# Patient Record
Sex: FEMALE | Race: WHITE | NOT HISPANIC OR LATINO | Employment: UNEMPLOYED | URBAN - NONMETROPOLITAN AREA
[De-identification: names, ages, dates, MRNs, and addresses within clinical notes are randomized per-mention and may not be internally consistent; named-entity substitution may affect disease eponyms.]

---

## 2021-02-09 ENCOUNTER — APPOINTMENT (OUTPATIENT)
Dept: GENERAL RADIOLOGY | Facility: HOSPITAL | Age: 41
End: 2021-02-09

## 2021-02-09 ENCOUNTER — HOSPITAL ENCOUNTER (EMERGENCY)
Facility: HOSPITAL | Age: 41
Discharge: HOME | End: 2021-02-09
Admitting: EMERGENCY MEDICINE

## 2021-02-09 VITALS
BODY MASS INDEX: 31.1 KG/M2 | RESPIRATION RATE: 16 BRPM | HEART RATE: 85 BPM | DIASTOLIC BLOOD PRESSURE: 79 MMHG | SYSTOLIC BLOOD PRESSURE: 127 MMHG | OXYGEN SATURATION: 96 % | WEIGHT: 210 LBS | TEMPERATURE: 98.6 F | HEIGHT: 69 IN

## 2021-02-09 DIAGNOSIS — J45.901 EXACERBATION OF ASTHMA, UNSPECIFIED ASTHMA SEVERITY, UNSPECIFIED WHETHER PERSISTENT: Primary | ICD-10-CM

## 2021-02-09 DIAGNOSIS — J06.9 VIRAL URI WITH COUGH: ICD-10-CM

## 2021-02-09 LAB
ALBUMIN SERPL-MCNC: 4 G/DL (ref 3.5–5.2)
ALBUMIN/GLOB SERPL: 1.7 G/DL
ALP SERPL-CCNC: 62 U/L (ref 39–117)
ALT SERPL W P-5'-P-CCNC: <5 U/L (ref 1–33)
ANION GAP SERPL CALCULATED.3IONS-SCNC: 8 MMOL/L (ref 5–15)
ARTERIAL PATENCY WRIST A: POSITIVE
AST SERPL-CCNC: 13 U/L (ref 1–32)
ATMOSPHERIC PRESS: 757 MMHG
B-HCG UR QL: NEGATIVE
BASE EXCESS BLDA CALC-SCNC: -0.5 MMOL/L (ref 0–2)
BASOPHILS # BLD AUTO: 0.1 10*3/MM3 (ref 0–0.2)
BASOPHILS NFR BLD AUTO: 1 % (ref 0–1.5)
BDY SITE: ABNORMAL
BILIRUB SERPL-MCNC: 0.3 MG/DL (ref 0–1.2)
BODY TEMPERATURE: 37 C
BUN SERPL-MCNC: 9 MG/DL (ref 6–20)
BUN/CREAT SERPL: 12.3 (ref 7–25)
CALCIUM SPEC-SCNC: 9.1 MG/DL (ref 8.6–10.5)
CHLORIDE SERPL-SCNC: 107 MMOL/L (ref 98–107)
CO2 SERPL-SCNC: 23 MMOL/L (ref 22–29)
CREAT SERPL-MCNC: 0.73 MG/DL (ref 0.57–1)
D DIMER PPP FEU-MCNC: 0.29 MG/L (FEU) (ref 0–0.5)
D-LACTATE SERPL-SCNC: 0.9 MMOL/L (ref 0.5–2)
DEPRECATED RDW RBC AUTO: 44.9 FL (ref 37–54)
EOSINOPHIL # BLD AUTO: 0.23 10*3/MM3 (ref 0–0.4)
EOSINOPHIL NFR BLD AUTO: 2.4 % (ref 0.3–6.2)
ERYTHROCYTE [DISTWIDTH] IN BLOOD BY AUTOMATED COUNT: 16.7 % (ref 12.3–15.4)
FLUAV RNA RESP QL NAA+PROBE: NOT DETECTED
FLUBV RNA RESP QL NAA+PROBE: NOT DETECTED
GFR SERPL CREATININE-BSD FRML MDRD: 88 ML/MIN/1.73
GLOBULIN UR ELPH-MCNC: 2.4 GM/DL
GLUCOSE SERPL-MCNC: 121 MG/DL (ref 65–99)
HCO3 BLDA-SCNC: 23.5 MMOL/L (ref 20–26)
HCT VFR BLD AUTO: 32.7 % (ref 34–46.6)
HGB BLD-MCNC: 10.1 G/DL (ref 12–15.9)
IMM GRANULOCYTES # BLD AUTO: 0.02 10*3/MM3 (ref 0–0.05)
IMM GRANULOCYTES NFR BLD AUTO: 0.2 % (ref 0–0.5)
INTERNAL NEGATIVE CONTROL: NEGATIVE
INTERNAL POSITIVE CONTROL: POSITIVE
LYMPHOCYTES # BLD AUTO: 2.39 10*3/MM3 (ref 0.7–3.1)
LYMPHOCYTES NFR BLD AUTO: 24.6 % (ref 19.6–45.3)
Lab: ABNORMAL
Lab: NORMAL
MCH RBC QN AUTO: 23.5 PG (ref 26.6–33)
MCHC RBC AUTO-ENTMCNC: 30.9 G/DL (ref 31.5–35.7)
MCV RBC AUTO: 76.2 FL (ref 79–97)
MODALITY: ABNORMAL
MONOCYTES # BLD AUTO: 0.61 10*3/MM3 (ref 0.1–0.9)
MONOCYTES NFR BLD AUTO: 6.3 % (ref 5–12)
NEUTROPHILS NFR BLD AUTO: 6.38 10*3/MM3 (ref 1.7–7)
NEUTROPHILS NFR BLD AUTO: 65.5 % (ref 42.7–76)
NRBC BLD AUTO-RTO: 0 /100 WBC (ref 0–0.2)
PCO2 BLDA: 35.2 MM HG (ref 35–45)
PCO2 TEMP ADJ BLD: 35.2 MM HG (ref 35–45)
PH BLDA: 7.43 PH UNITS (ref 7.35–7.45)
PH, TEMP CORRECTED: 7.43 PH UNITS (ref 7.35–7.45)
PLATELET # BLD AUTO: 327 10*3/MM3 (ref 140–450)
PMV BLD AUTO: 10.9 FL (ref 6–12)
PO2 BLDA: 86.9 MM HG (ref 83–108)
PO2 TEMP ADJ BLD: 86.9 MM HG (ref 83–108)
POTASSIUM SERPL-SCNC: 4 MMOL/L (ref 3.5–5.2)
PROCALCITONIN SERPL-MCNC: 0.02 NG/ML (ref 0–0.25)
PROT SERPL-MCNC: 6.4 G/DL (ref 6–8.5)
RBC # BLD AUTO: 4.29 10*6/MM3 (ref 3.77–5.28)
SAO2 % BLDCOA: 97.7 % (ref 94–99)
SARS-COV-2 RNA RESP QL NAA+PROBE: NOT DETECTED
SODIUM SERPL-SCNC: 138 MMOL/L (ref 136–145)
VENTILATOR MODE: ABNORMAL
WBC # BLD AUTO: 9.73 10*3/MM3 (ref 3.4–10.8)

## 2021-02-09 PROCEDURE — 83605 ASSAY OF LACTIC ACID: CPT | Performed by: PHYSICIAN ASSISTANT

## 2021-02-09 PROCEDURE — 94640 AIRWAY INHALATION TREATMENT: CPT

## 2021-02-09 PROCEDURE — 99284 EMERGENCY DEPT VISIT MOD MDM: CPT

## 2021-02-09 PROCEDURE — 84145 PROCALCITONIN (PCT): CPT | Performed by: PHYSICIAN ASSISTANT

## 2021-02-09 PROCEDURE — 85379 FIBRIN DEGRADATION QUANT: CPT | Performed by: PHYSICIAN ASSISTANT

## 2021-02-09 PROCEDURE — 25010000002 KETOROLAC TROMETHAMINE PER 15 MG: Performed by: PHYSICIAN ASSISTANT

## 2021-02-09 PROCEDURE — 94799 UNLISTED PULMONARY SVC/PX: CPT

## 2021-02-09 PROCEDURE — 80053 COMPREHEN METABOLIC PANEL: CPT | Performed by: PHYSICIAN ASSISTANT

## 2021-02-09 PROCEDURE — 82803 BLOOD GASES ANY COMBINATION: CPT

## 2021-02-09 PROCEDURE — 81025 URINE PREGNANCY TEST: CPT | Performed by: PHYSICIAN ASSISTANT

## 2021-02-09 PROCEDURE — 87636 SARSCOV2 & INF A&B AMP PRB: CPT | Performed by: PHYSICIAN ASSISTANT

## 2021-02-09 PROCEDURE — 25010000002 DEXAMETHASONE PER 1 MG: Performed by: PHYSICIAN ASSISTANT

## 2021-02-09 PROCEDURE — 71045 X-RAY EXAM CHEST 1 VIEW: CPT

## 2021-02-09 PROCEDURE — 85025 COMPLETE CBC W/AUTO DIFF WBC: CPT | Performed by: PHYSICIAN ASSISTANT

## 2021-02-09 PROCEDURE — 96374 THER/PROPH/DIAG INJ IV PUSH: CPT

## 2021-02-09 PROCEDURE — 96375 TX/PRO/DX INJ NEW DRUG ADDON: CPT

## 2021-02-09 PROCEDURE — 36600 WITHDRAWAL OF ARTERIAL BLOOD: CPT

## 2021-02-09 RX ORDER — METHYLPREDNISOLONE 4 MG/1
TABLET ORAL
Qty: 21 EACH | Refills: 0 | Status: ON HOLD | OUTPATIENT
Start: 2021-02-09 | End: 2021-10-25

## 2021-02-09 RX ORDER — SODIUM CHLORIDE 0.9 % (FLUSH) 0.9 %
10 SYRINGE (ML) INJECTION AS NEEDED
Status: DISCONTINUED | OUTPATIENT
Start: 2021-02-09 | End: 2021-02-09 | Stop reason: HOSPADM

## 2021-02-09 RX ORDER — ALBUTEROL SULFATE 90 UG/1
2 AEROSOL, METERED RESPIRATORY (INHALATION) EVERY 4 HOURS PRN
Qty: 6.7 G | Refills: 0 | Status: SHIPPED | OUTPATIENT
Start: 2021-02-09 | End: 2022-01-11 | Stop reason: SDUPTHER

## 2021-02-09 RX ORDER — DEXAMETHASONE SODIUM PHOSPHATE 4 MG/ML
4 INJECTION, SOLUTION INTRA-ARTICULAR; INTRALESIONAL; INTRAMUSCULAR; INTRAVENOUS; SOFT TISSUE ONCE
Status: COMPLETED | OUTPATIENT
Start: 2021-02-09 | End: 2021-02-09

## 2021-02-09 RX ORDER — IPRATROPIUM BROMIDE AND ALBUTEROL SULFATE 2.5; .5 MG/3ML; MG/3ML
3 SOLUTION RESPIRATORY (INHALATION) ONCE
Status: COMPLETED | OUTPATIENT
Start: 2021-02-09 | End: 2021-02-09

## 2021-02-09 RX ORDER — AZITHROMYCIN 250 MG/1
TABLET, FILM COATED ORAL
Qty: 6 TABLET | Refills: 0 | Status: ON HOLD | OUTPATIENT
Start: 2021-02-09 | End: 2021-10-25

## 2021-02-09 RX ORDER — KETOROLAC TROMETHAMINE 30 MG/ML
30 INJECTION, SOLUTION INTRAMUSCULAR; INTRAVENOUS ONCE
Status: COMPLETED | OUTPATIENT
Start: 2021-02-09 | End: 2021-02-09

## 2021-02-09 RX ADMIN — DEXAMETHASONE SODIUM PHOSPHATE 4 MG: 4 INJECTION, SOLUTION INTRAMUSCULAR; INTRAVENOUS at 14:57

## 2021-02-09 RX ADMIN — KETOROLAC TROMETHAMINE 30 MG: 30 INJECTION, SOLUTION INTRAMUSCULAR; INTRAVENOUS at 16:40

## 2021-02-09 RX ADMIN — IPRATROPIUM BROMIDE AND ALBUTEROL SULFATE 3 ML: 2.5; .5 SOLUTION RESPIRATORY (INHALATION) at 14:48

## 2021-02-09 RX ADMIN — SODIUM CHLORIDE 1000 ML: 9 INJECTION, SOLUTION INTRAVENOUS at 14:57

## 2021-02-09 NOTE — DISCHARGE INSTRUCTIONS
Follow up with one of the [facility] physician groups below to setup primary care. If you have trouble making an appointment, please call the [facility] Nurse Line at [phone]    Dr. Elena Taylor DO, Dr. Aba Joyner DO, and LILLY Cruz  [facility]  [address] [place] [address], [address] [phone]    Dr. Heladio Bui MD  [facility] - [address] [address], [address], [place], [place] [id number] [phone]    Dr. Owen Arciniega DO, Dr. Aly Murphy DO,  LILLY Krishnamurthy, and LILLY Carrillo  [facility] - [address] [address], [address], [place], [place] [address] [phone]     Dr. Marivel Herrera MD, and LILLY Leon  [facility] [address] [address], [place], [place] [id number] [phone]    Dr. Jimmy Valladares MD and Dr. Jaquan Ayala MD  [address], [address] [phone]    Dr. Magno Flores MD  [facility]  [address] [place] [address], [address], [place], [place], [address] [phone]    Dr. Dionisio Colby MD  [facility]  [address], [place], [address] [phone]

## 2021-02-09 NOTE — ED PROVIDER NOTES
Subjective   History of Present Illness    Patient is a 41-year-old female presenting to ED with shortness of breath and fever.  Patient has medical history significant for asthma.  Patient reports over the past few days she has not felt well.  Patient reports that this morning she woke up with a new symptom of nausea as well as concerns for fever.  Patient reports she had an oral fever of 103.  Patient reports that she has had generalized body aches, left arm soreness, nonproductive cough, as well as increase of wheezing.  Patient reports that she has been using her albuterol rescue inhaler more frequently with shorter intervals of relief.  Patient denies any chills or diaphoresis.  Patient denies any emesis, diarrhea, abdominal pain.  Patient denies any urinary symptoms. Patient reports she has been using ibuprofen to try to help with her left arm discomfort with her last dose this morning.  Patient reports that she did try a dose of Tylenol at about 11:30 AM.  Patient reports a generalized headache and denies any focal neurological deficits.  Patient denies any dizziness, lightheadedness.  Patient denies any known recent sick contact.    Patient has no known medication allergies.  Patient has medical [place] for asthma.  Exam patient has surgical history positive for , tubal ligation, tonsillectomy.  Patient reports she is a current less than 1 pack/day cigarette smoker.  Patient reports she will drink socially however over the past couple days should she drink more to celebrate her birthday.  Patient denies use of any further tobacco products, marijuana, or any other IV/recreational/illicit drugs.    Records reviewed patient was last seen in the ED on 2017 for right foot injury, foot swelling, contusion of right foot.  Patient has no outpatient, inpatient, or ED visits since.    Review of Systems   Constitutional: Positive for fever (103 orally at highest this morning). Negative for chills and  diaphoresis.   HENT: Negative for congestion.    Eyes: Negative.    Respiratory: Positive for cough (Nonproductive), chest tightness, shortness of breath and wheezing.    Cardiovascular: Negative for chest pain.   Gastrointestinal: Positive for nausea. Negative for abdominal pain and vomiting.   Genitourinary: Negative.    Musculoskeletal: Positive for myalgias (Generalized body aches).   Skin: Negative.    Neurological: Positive for headaches (Generalized).   Psychiatric/Behavioral: Negative.    All other systems reviewed and are negative.      Past Medical History:   Diagnosis Date   • Asthma        No Known Allergies    Past Surgical History:   Procedure Laterality Date   •  SECTION     • TONSILLECTOMY     • TUBAL ABDOMINAL LIGATION         History reviewed. No pertinent family history.    Social History     Socioeconomic History   • Marital status:      Spouse name: Not on file   • Number of children: Not on file   • Years of education: Not on file   • Highest education level: Not on file   Tobacco Use   • Smoking status: Current Every Day Smoker     Packs/day: 0.50   Substance and Sexual Activity   • Alcohol use: Not Currently   • Drug use: Not Currently           Objective   Physical Exam  Vitals signs and nursing note reviewed.   Constitutional:       General: She is in acute distress (Appears uncomfortable).      Appearance: Normal appearance. She is well-developed, well-groomed and overweight. She is ill-appearing. She is not toxic-appearing or diaphoretic.   HENT:      Head: Normocephalic.      Nose: Nose normal. No congestion.      Mouth/Throat:      Mouth: Mucous membranes are moist.      Pharynx: Oropharynx is clear. Posterior oropharyngeal erythema present.   Eyes:      General: No scleral icterus.     Extraocular Movements: Extraocular movements intact.      Conjunctiva/sclera: Conjunctivae normal.      Pupils: Pupils are equal, round, and reactive to light.   Neck:       Musculoskeletal: Normal range of motion.   Cardiovascular:      Rate and Rhythm: Regular rhythm. Tachycardia present.      Heart sounds: Normal heart sounds.   Pulmonary:      Effort: Pulmonary effort is normal. No tachypnea, accessory muscle usage, prolonged expiration or respiratory distress.      Breath sounds: No stridor. Wheezing (Inspiratory and expiratory throughout, most notable in bilateral lower lung bases) present. No decreased breath sounds, rhonchi or rales.   Chest:      Chest wall: No tenderness (No reproducible tenderness palpation of chest wall).   Abdominal:      General: Bowel sounds are normal.      Palpations: Abdomen is soft.   Musculoskeletal: Normal range of motion.      Right lower leg: No edema.      Left lower leg: No edema.   Skin:     General: Skin is warm and dry.      Coloration: Skin is not cyanotic or pale.   Neurological:      Mental Status: She is alert and oriented to person, place, and time.   Psychiatric:         Attention and Perception: Attention normal.         Mood and Affect: Affect normal. Mood is anxious (mild).         Speech: Speech normal.         Behavior: Behavior normal. Behavior is cooperative.         Procedures           ED Course    WELLS CRITERIA: 0 (low risk group, 1.3% chance of PE in an ED population)    PERC SCORE: 0 (no need for further work-up, less than 2% chance of PE)    ED Course as of Feb 09 2250   Tue Feb 09, 2021   1446 Pregnancy testing negative.ABG with no acute findings including normal pH, normal CO2, normal O2, normal HCO3.    [JS]   1530 CBC with no leukocytosis/leukopenia, H&H stable at 10.1/32.7.    [JS]   1555 CMP with hyperglycemia 121 and no other acute findings.  No anion gap.Procalcitonin 0.02.D-dimer 0.29.Lactic acid 0.9.    CXR shows: No acute disease.    COVID swab pending.     [JS]   1612 COVID negative.  Influenza negative.     [JS]   1612 Upon reevaluation at this time patient resting in bed reporting she is feeling better.   Repeat lung examination shows resolution of wheezing as well as improvement of chest tightness.  Discussed with patient lab and imaging findings.  Discussed negative respiratory swabs.  Discussed need to establish care with a primary care provider and provided patient list of available for doctors in this area.  Discussed need for reevaluation within the next 24 h.  Discussed need for continued symptomatic treatment at home.  Discussed very strict return precautions any for me to return to ED should she develop any new or worsening symptoms.  Patient with no further questions, concerns, needs at this time and is now stable for discharge.    [JS]      ED Course User Index  [JS] Jagjit Foster PA-C                                           MDM  Number of Diagnoses or Management Options     Amount and/or Complexity of Data Reviewed  Clinical lab tests: ordered and reviewed  Tests in the radiology section of CPT®: reviewed and ordered  Tests in the medicine section of CPT®: ordered and reviewed  Decide to obtain previous medical records or to obtain history from someone other than the patient: yes  Review and summarize past medical records: yes  Discuss the patient with other providers: yes        Final diagnoses:   Exacerbation of asthma, unspecified asthma severity, unspecified whether persistent   Viral URI with cough            Jagjit Foster PA-C  02/09/21 2555

## 2021-10-23 ENCOUNTER — APPOINTMENT (OUTPATIENT)
Dept: CT IMAGING | Facility: HOSPITAL | Age: 41
End: 2021-10-23

## 2021-10-23 ENCOUNTER — HOSPITAL ENCOUNTER (INPATIENT)
Facility: HOSPITAL | Age: 41
LOS: 3 days | Discharge: HOME | End: 2021-10-26
Attending: EMERGENCY MEDICINE | Admitting: FAMILY MEDICINE

## 2021-10-23 DIAGNOSIS — I73.9 PERIPHERAL VASCULAR DISEASE (HCC): Primary | ICD-10-CM

## 2021-10-23 DIAGNOSIS — I70.90 ATHEROSCLEROSIS: ICD-10-CM

## 2021-10-23 DIAGNOSIS — I74.8 SUBCLAVIAN ARTERY THROMBOSIS (HCC): ICD-10-CM

## 2021-10-23 DIAGNOSIS — F19.90 RECREATIONAL DRUG USE: ICD-10-CM

## 2021-10-23 LAB
ALBUMIN SERPL-MCNC: 4 G/DL (ref 3.5–5.2)
ALBUMIN/GLOB SERPL: 1.7 G/DL
ALP SERPL-CCNC: 64 U/L (ref 39–117)
ALT SERPL W P-5'-P-CCNC: <5 U/L (ref 1–33)
AMPHET+METHAMPHET UR QL: POSITIVE
AMPHETAMINES UR QL: POSITIVE
ANION GAP SERPL CALCULATED.3IONS-SCNC: 8 MMOL/L (ref 5–15)
APTT PPP: 28.7 SECONDS (ref 24.1–35)
APTT PPP: 31.5 SECONDS (ref 24.1–35)
APTT PPP: 53 SECONDS (ref 24.1–35)
AST SERPL-CCNC: 9 U/L (ref 1–32)
B-HCG UR QL: NEGATIVE
BARBITURATES UR QL SCN: NEGATIVE
BASOPHILS # BLD AUTO: 0.04 10*3/MM3 (ref 0–0.2)
BASOPHILS # BLD AUTO: 0.09 10*3/MM3 (ref 0–0.2)
BASOPHILS NFR BLD AUTO: 0.4 % (ref 0–1.5)
BASOPHILS NFR BLD AUTO: 0.8 % (ref 0–1.5)
BENZODIAZ UR QL SCN: NEGATIVE
BILIRUB SERPL-MCNC: 0.2 MG/DL (ref 0–1.2)
BUN SERPL-MCNC: 11 MG/DL (ref 6–20)
BUN/CREAT SERPL: 14.3 (ref 7–25)
BUPRENORPHINE SERPL-MCNC: NEGATIVE NG/ML
CALCIUM SPEC-SCNC: 9.3 MG/DL (ref 8.6–10.5)
CANNABINOIDS SERPL QL: NEGATIVE
CHLORIDE SERPL-SCNC: 105 MMOL/L (ref 98–107)
CK SERPL-CCNC: 53 U/L (ref 20–180)
CO2 SERPL-SCNC: 26 MMOL/L (ref 22–29)
COCAINE UR QL: NEGATIVE
CREAT SERPL-MCNC: 0.77 MG/DL (ref 0.57–1)
D-LACTATE SERPL-SCNC: 1.7 MMOL/L (ref 0.5–2)
DEPRECATED RDW RBC AUTO: 45.9 FL (ref 37–54)
DEPRECATED RDW RBC AUTO: 46.2 FL (ref 37–54)
EOSINOPHIL # BLD AUTO: 0 10*3/MM3 (ref 0–0.4)
EOSINOPHIL # BLD AUTO: 0.36 10*3/MM3 (ref 0–0.4)
EOSINOPHIL NFR BLD AUTO: 0 % (ref 0.3–6.2)
EOSINOPHIL NFR BLD AUTO: 3.1 % (ref 0.3–6.2)
ERYTHROCYTE [DISTWIDTH] IN BLOOD BY AUTOMATED COUNT: 16.8 % (ref 12.3–15.4)
ERYTHROCYTE [DISTWIDTH] IN BLOOD BY AUTOMATED COUNT: 17 % (ref 12.3–15.4)
EXPIRATION DATE: NORMAL
GFR SERPL CREATININE-BSD FRML MDRD: 83 ML/MIN/1.73
GLOBULIN UR ELPH-MCNC: 2.3 GM/DL
GLUCOSE SERPL-MCNC: 137 MG/DL (ref 65–99)
HCT VFR BLD AUTO: 33.3 % (ref 34–46.6)
HCT VFR BLD AUTO: 33.8 % (ref 34–46.6)
HGB BLD-MCNC: 9.5 G/DL (ref 12–15.9)
HGB BLD-MCNC: 9.6 G/DL (ref 12–15.9)
HOLD SPECIMEN: NORMAL
HOLD SPECIMEN: NORMAL
IMM GRANULOCYTES # BLD AUTO: 0.03 10*3/MM3 (ref 0–0.05)
IMM GRANULOCYTES # BLD AUTO: 0.05 10*3/MM3 (ref 0–0.05)
IMM GRANULOCYTES NFR BLD AUTO: 0.3 % (ref 0–0.5)
IMM GRANULOCYTES NFR BLD AUTO: 0.4 % (ref 0–0.5)
INR PPP: 1.11 (ref 0.91–1.09)
INTERNAL NEGATIVE CONTROL: NEGATIVE
INTERNAL POSITIVE CONTROL: POSITIVE
IRON 24H UR-MRATE: 16 MCG/DL (ref 37–145)
IRON SATN MFR SERPL: 3 % (ref 20–50)
LYMPHOCYTES # BLD AUTO: 0.8 10*3/MM3 (ref 0.7–3.1)
LYMPHOCYTES # BLD AUTO: 1.86 10*3/MM3 (ref 0.7–3.1)
LYMPHOCYTES NFR BLD AUTO: 15.9 % (ref 19.6–45.3)
LYMPHOCYTES NFR BLD AUTO: 8.4 % (ref 19.6–45.3)
Lab: NORMAL
MAGNESIUM SERPL-MCNC: 2 MG/DL (ref 1.6–2.6)
MCH RBC QN AUTO: 21.5 PG (ref 26.6–33)
MCH RBC QN AUTO: 21.6 PG (ref 26.6–33)
MCHC RBC AUTO-ENTMCNC: 28.4 G/DL (ref 31.5–35.7)
MCHC RBC AUTO-ENTMCNC: 28.5 G/DL (ref 31.5–35.7)
MCV RBC AUTO: 75.8 FL (ref 79–97)
MCV RBC AUTO: 75.9 FL (ref 79–97)
METHADONE UR QL SCN: NEGATIVE
MONOCYTES # BLD AUTO: 0.05 10*3/MM3 (ref 0.1–0.9)
MONOCYTES # BLD AUTO: 0.8 10*3/MM3 (ref 0.1–0.9)
MONOCYTES NFR BLD AUTO: 0.5 % (ref 5–12)
MONOCYTES NFR BLD AUTO: 6.8 % (ref 5–12)
NEUTROPHILS NFR BLD AUTO: 73 % (ref 42.7–76)
NEUTROPHILS NFR BLD AUTO: 8.54 10*3/MM3 (ref 1.7–7)
NEUTROPHILS NFR BLD AUTO: 8.63 10*3/MM3 (ref 1.7–7)
NEUTROPHILS NFR BLD AUTO: 90.4 % (ref 42.7–76)
NRBC BLD AUTO-RTO: 0 /100 WBC (ref 0–0.2)
NRBC BLD AUTO-RTO: 0 /100 WBC (ref 0–0.2)
OPIATES UR QL: NEGATIVE
OXYCODONE UR QL SCN: NEGATIVE
PCP UR QL SCN: NEGATIVE
PLATELET # BLD AUTO: 318 10*3/MM3 (ref 140–450)
PLATELET # BLD AUTO: 326 10*3/MM3 (ref 140–450)
PMV BLD AUTO: 10.2 FL (ref 6–12)
PMV BLD AUTO: 10.4 FL (ref 6–12)
POTASSIUM SERPL-SCNC: 4.2 MMOL/L (ref 3.5–5.2)
PROPOXYPH UR QL: NEGATIVE
PROT SERPL-MCNC: 6.3 G/DL (ref 6–8.5)
PROTHROMBIN TIME: 13.9 SECONDS (ref 11.9–14.6)
RBC # BLD AUTO: 4.39 10*6/MM3 (ref 3.77–5.28)
RBC # BLD AUTO: 4.46 10*6/MM3 (ref 3.77–5.28)
SARS-COV-2 RNA PNL SPEC NAA+PROBE: NOT DETECTED
SODIUM SERPL-SCNC: 139 MMOL/L (ref 136–145)
TIBC SERPL-MCNC: 533 MCG/DL (ref 298–536)
TRANSFERRIN SERPL-MCNC: 358 MG/DL (ref 200–360)
TRICYCLICS UR QL SCN: NEGATIVE
TROPONIN T SERPL-MCNC: <0.01 NG/ML (ref 0–0.03)
WBC # BLD AUTO: 11.7 10*3/MM3 (ref 3.4–10.8)
WBC # BLD AUTO: 9.55 10*3/MM3 (ref 3.4–10.8)
WHOLE BLOOD HOLD SPECIMEN: NORMAL
WHOLE BLOOD HOLD SPECIMEN: NORMAL

## 2021-10-23 PROCEDURE — 70496 CT ANGIOGRAPHY HEAD: CPT

## 2021-10-23 PROCEDURE — 70498 CT ANGIOGRAPHY NECK: CPT

## 2021-10-23 PROCEDURE — 36415 COLL VENOUS BLD VENIPUNCTURE: CPT

## 2021-10-23 PROCEDURE — 99253 IP/OBS CNSLTJ NEW/EST LOW 45: CPT | Performed by: SURGERY

## 2021-10-23 PROCEDURE — 85610 PROTHROMBIN TIME: CPT | Performed by: EMERGENCY MEDICINE

## 2021-10-23 PROCEDURE — 25010000002 PIPERACILLIN SOD-TAZOBACTAM PER 1 G: Performed by: EMERGENCY MEDICINE

## 2021-10-23 PROCEDURE — 0 IOPAMIDOL PER 1 ML: Performed by: EMERGENCY MEDICINE

## 2021-10-23 PROCEDURE — 93010 ELECTROCARDIOGRAM REPORT: CPT | Performed by: INTERNAL MEDICINE

## 2021-10-23 PROCEDURE — 36415 COLL VENOUS BLD VENIPUNCTURE: CPT | Performed by: FAMILY MEDICINE

## 2021-10-23 PROCEDURE — 80053 COMPREHEN METABOLIC PANEL: CPT | Performed by: EMERGENCY MEDICINE

## 2021-10-23 PROCEDURE — 83540 ASSAY OF IRON: CPT | Performed by: FAMILY MEDICINE

## 2021-10-23 PROCEDURE — 25010000002 DEXAMETHASONE PER 1 MG: Performed by: EMERGENCY MEDICINE

## 2021-10-23 PROCEDURE — 99284 EMERGENCY DEPT VISIT MOD MDM: CPT

## 2021-10-23 PROCEDURE — 82550 ASSAY OF CK (CPK): CPT | Performed by: EMERGENCY MEDICINE

## 2021-10-23 PROCEDURE — 80306 DRUG TEST PRSMV INSTRMNT: CPT | Performed by: EMERGENCY MEDICINE

## 2021-10-23 PROCEDURE — 25010000002 HEPARIN (PORCINE) PER 1000 UNITS: Performed by: EMERGENCY MEDICINE

## 2021-10-23 PROCEDURE — 25010000002 DIPHENHYDRAMINE PER 50 MG: Performed by: EMERGENCY MEDICINE

## 2021-10-23 PROCEDURE — 85730 THROMBOPLASTIN TIME PARTIAL: CPT | Performed by: EMERGENCY MEDICINE

## 2021-10-23 PROCEDURE — 72125 CT NECK SPINE W/O DYE: CPT

## 2021-10-23 PROCEDURE — 87635 SARS-COV-2 COVID-19 AMP PRB: CPT | Performed by: EMERGENCY MEDICINE

## 2021-10-23 PROCEDURE — 25010000002 ORPHENADRINE CITRATE PER 60 MG: Performed by: EMERGENCY MEDICINE

## 2021-10-23 PROCEDURE — 25010000002 ENOXAPARIN PER 10 MG: Performed by: EMERGENCY MEDICINE

## 2021-10-23 PROCEDURE — 25010000002 VANCOMYCIN 10 G RECONSTITUTED SOLUTION: Performed by: EMERGENCY MEDICINE

## 2021-10-23 PROCEDURE — 83605 ASSAY OF LACTIC ACID: CPT | Performed by: EMERGENCY MEDICINE

## 2021-10-23 PROCEDURE — 84466 ASSAY OF TRANSFERRIN: CPT | Performed by: FAMILY MEDICINE

## 2021-10-23 PROCEDURE — 87040 BLOOD CULTURE FOR BACTERIA: CPT | Performed by: EMERGENCY MEDICINE

## 2021-10-23 PROCEDURE — 25010000002 HEPARIN (PORCINE) PER 1000 UNITS: Performed by: FAMILY MEDICINE

## 2021-10-23 PROCEDURE — 93005 ELECTROCARDIOGRAM TRACING: CPT | Performed by: EMERGENCY MEDICINE

## 2021-10-23 PROCEDURE — 84484 ASSAY OF TROPONIN QUANT: CPT | Performed by: EMERGENCY MEDICINE

## 2021-10-23 PROCEDURE — 85730 THROMBOPLASTIN TIME PARTIAL: CPT | Performed by: FAMILY MEDICINE

## 2021-10-23 PROCEDURE — 71275 CT ANGIOGRAPHY CHEST: CPT

## 2021-10-23 PROCEDURE — 81025 URINE PREGNANCY TEST: CPT | Performed by: EMERGENCY MEDICINE

## 2021-10-23 PROCEDURE — 85025 COMPLETE CBC W/AUTO DIFF WBC: CPT | Performed by: EMERGENCY MEDICINE

## 2021-10-23 PROCEDURE — 83735 ASSAY OF MAGNESIUM: CPT | Performed by: EMERGENCY MEDICINE

## 2021-10-23 PROCEDURE — 25010000002 METOCLOPRAMIDE PER 10 MG: Performed by: EMERGENCY MEDICINE

## 2021-10-23 RX ORDER — HEPARIN SODIUM 1000 [USP'U]/ML
80 INJECTION, SOLUTION INTRAVENOUS; SUBCUTANEOUS ONCE
Status: COMPLETED | OUTPATIENT
Start: 2021-10-23 | End: 2021-10-23

## 2021-10-23 RX ORDER — HEPARIN SODIUM 1000 [USP'U]/ML
40 INJECTION, SOLUTION INTRAVENOUS; SUBCUTANEOUS AS NEEDED
Status: DISCONTINUED | OUTPATIENT
Start: 2021-10-23 | End: 2021-10-25

## 2021-10-23 RX ORDER — HEPARIN SODIUM 10000 [USP'U]/100ML
1500 INJECTION, SOLUTION INTRAVENOUS
Status: DISCONTINUED | OUTPATIENT
Start: 2021-10-23 | End: 2021-10-25

## 2021-10-23 RX ORDER — HEPARIN SODIUM 1000 [USP'U]/ML
80 INJECTION, SOLUTION INTRAVENOUS; SUBCUTANEOUS AS NEEDED
Status: DISCONTINUED | OUTPATIENT
Start: 2021-10-23 | End: 2021-10-25

## 2021-10-23 RX ORDER — HYDROCODONE BITARTRATE AND ACETAMINOPHEN 5; 325 MG/1; MG/1
1 TABLET ORAL EVERY 6 HOURS PRN
Status: DISCONTINUED | OUTPATIENT
Start: 2021-10-23 | End: 2021-10-26 | Stop reason: HOSPADM

## 2021-10-23 RX ORDER — ORPHENADRINE CITRATE 30 MG/ML
60 INJECTION INTRAMUSCULAR; INTRAVENOUS ONCE
Status: COMPLETED | OUTPATIENT
Start: 2021-10-23 | End: 2021-10-23

## 2021-10-23 RX ORDER — DIPHENHYDRAMINE HYDROCHLORIDE 50 MG/ML
25 INJECTION INTRAMUSCULAR; INTRAVENOUS ONCE
Status: COMPLETED | OUTPATIENT
Start: 2021-10-23 | End: 2021-10-23

## 2021-10-23 RX ORDER — METOCLOPRAMIDE HYDROCHLORIDE 5 MG/ML
10 INJECTION INTRAMUSCULAR; INTRAVENOUS ONCE
Status: COMPLETED | OUTPATIENT
Start: 2021-10-23 | End: 2021-10-23

## 2021-10-23 RX ORDER — DEXAMETHASONE SODIUM PHOSPHATE 10 MG/ML
10 INJECTION INTRAMUSCULAR; INTRAVENOUS ONCE
Status: COMPLETED | OUTPATIENT
Start: 2021-10-23 | End: 2021-10-23

## 2021-10-23 RX ADMIN — METOCLOPRAMIDE 10 MG: 5 INJECTION, SOLUTION INTRAMUSCULAR; INTRAVENOUS at 04:10

## 2021-10-23 RX ADMIN — ORPHENADRINE CITRATE 60 MG: 30 INJECTION INTRAMUSCULAR; INTRAVENOUS at 04:10

## 2021-10-23 RX ADMIN — HYDROCODONE BITARTRATE AND ACETAMINOPHEN 1 TABLET: 5; 325 TABLET ORAL at 16:44

## 2021-10-23 RX ADMIN — HYDROCODONE BITARTRATE AND ACETAMINOPHEN 1 TABLET: 5; 325 TABLET ORAL at 22:41

## 2021-10-23 RX ADMIN — DIPHENHYDRAMINE HYDROCHLORIDE 25 MG: 50 INJECTION, SOLUTION INTRAMUSCULAR; INTRAVENOUS at 04:11

## 2021-10-23 RX ADMIN — DEXAMETHASONE SODIUM PHOSPHATE 10 MG: 10 INJECTION INTRAMUSCULAR; INTRAVENOUS at 04:12

## 2021-10-23 RX ADMIN — HEPARIN SODIUM 1500 UNITS/HR: 10000 INJECTION, SOLUTION INTRAVENOUS at 11:31

## 2021-10-23 RX ADMIN — IOPAMIDOL 100 ML: 755 INJECTION, SOLUTION INTRAVENOUS at 04:44

## 2021-10-23 RX ADMIN — VANCOMYCIN HYDROCHLORIDE 1750 MG: 10 INJECTION, POWDER, LYOPHILIZED, FOR SOLUTION INTRAVENOUS at 08:56

## 2021-10-23 RX ADMIN — IOPAMIDOL 100 ML: 755 INJECTION, SOLUTION INTRAVENOUS at 06:37

## 2021-10-23 RX ADMIN — ENOXAPARIN SODIUM 90 MG: 100 INJECTION SUBCUTANEOUS at 06:40

## 2021-10-23 RX ADMIN — HEPARIN SODIUM 7330 UNITS: 1000 INJECTION, SOLUTION INTRAVENOUS; SUBCUTANEOUS at 19:46

## 2021-10-23 RX ADMIN — SODIUM CHLORIDE, POTASSIUM CHLORIDE, SODIUM LACTATE AND CALCIUM CHLORIDE 1000 ML: 600; 310; 30; 20 INJECTION, SOLUTION INTRAVENOUS at 06:16

## 2021-10-23 RX ADMIN — HEPARIN SODIUM 7330 UNITS: 1000 INJECTION, SOLUTION INTRAVENOUS; SUBCUTANEOUS at 11:30

## 2021-10-23 RX ADMIN — PIPERACILLIN SODIUM AND TAZOBACTAM SODIUM 3.38 G: 3; .375 INJECTION, POWDER, LYOPHILIZED, FOR SOLUTION INTRAVENOUS at 07:10

## 2021-10-24 LAB
ALBUMIN SERPL-MCNC: 3.7 G/DL (ref 3.5–5.2)
ALBUMIN/GLOB SERPL: 1.9 G/DL
ALP SERPL-CCNC: 59 U/L (ref 39–117)
ALT SERPL W P-5'-P-CCNC: <5 U/L (ref 1–33)
ANION GAP SERPL CALCULATED.3IONS-SCNC: 7 MMOL/L (ref 5–15)
APTT PPP: 102.3 SECONDS (ref 24.1–35)
APTT PPP: 56.6 SECONDS (ref 24.1–35)
APTT PPP: 75.8 SECONDS (ref 24.1–35)
APTT PPP: 94.3 SECONDS (ref 24.1–35)
AST SERPL-CCNC: 8 U/L (ref 1–32)
BASOPHILS # BLD AUTO: 0.09 10*3/MM3 (ref 0–0.2)
BASOPHILS NFR BLD AUTO: 0.8 % (ref 0–1.5)
BILIRUB SERPL-MCNC: 0.2 MG/DL (ref 0–1.2)
BUN SERPL-MCNC: 13 MG/DL (ref 6–20)
BUN/CREAT SERPL: 20 (ref 7–25)
CALCIUM SPEC-SCNC: 8.6 MG/DL (ref 8.6–10.5)
CHLORIDE SERPL-SCNC: 108 MMOL/L (ref 98–107)
CO2 SERPL-SCNC: 25 MMOL/L (ref 22–29)
CREAT SERPL-MCNC: 0.65 MG/DL (ref 0.57–1)
DEPRECATED RDW RBC AUTO: 46.7 FL (ref 37–54)
EOSINOPHIL # BLD AUTO: 0.2 10*3/MM3 (ref 0–0.4)
EOSINOPHIL NFR BLD AUTO: 1.9 % (ref 0.3–6.2)
ERYTHROCYTE [DISTWIDTH] IN BLOOD BY AUTOMATED COUNT: 17 % (ref 12.3–15.4)
GFR SERPL CREATININE-BSD FRML MDRD: 100 ML/MIN/1.73
GLOBULIN UR ELPH-MCNC: 2 GM/DL
GLUCOSE SERPL-MCNC: 100 MG/DL (ref 65–99)
HCT VFR BLD AUTO: 31.1 % (ref 34–46.6)
HGB BLD-MCNC: 9 G/DL (ref 12–15.9)
IMM GRANULOCYTES # BLD AUTO: 0.03 10*3/MM3 (ref 0–0.05)
IMM GRANULOCYTES NFR BLD AUTO: 0.3 % (ref 0–0.5)
LYMPHOCYTES # BLD AUTO: 3.39 10*3/MM3 (ref 0.7–3.1)
LYMPHOCYTES NFR BLD AUTO: 31.8 % (ref 19.6–45.3)
MCH RBC QN AUTO: 22.1 PG (ref 26.6–33)
MCHC RBC AUTO-ENTMCNC: 28.9 G/DL (ref 31.5–35.7)
MCV RBC AUTO: 76.4 FL (ref 79–97)
MONOCYTES # BLD AUTO: 0.93 10*3/MM3 (ref 0.1–0.9)
MONOCYTES NFR BLD AUTO: 8.7 % (ref 5–12)
NEUTROPHILS NFR BLD AUTO: 56.5 % (ref 42.7–76)
NEUTROPHILS NFR BLD AUTO: 6.02 10*3/MM3 (ref 1.7–7)
NRBC BLD AUTO-RTO: 0 /100 WBC (ref 0–0.2)
PLATELET # BLD AUTO: 271 10*3/MM3 (ref 140–450)
PMV BLD AUTO: 10.4 FL (ref 6–12)
POTASSIUM SERPL-SCNC: 3.5 MMOL/L (ref 3.5–5.2)
PROT SERPL-MCNC: 5.7 G/DL (ref 6–8.5)
QT INTERVAL: 386 MS
QTC INTERVAL: 442 MS
RBC # BLD AUTO: 4.07 10*6/MM3 (ref 3.77–5.28)
SODIUM SERPL-SCNC: 140 MMOL/L (ref 136–145)
WBC # BLD AUTO: 10.66 10*3/MM3 (ref 3.4–10.8)

## 2021-10-24 PROCEDURE — 85730 THROMBOPLASTIN TIME PARTIAL: CPT | Performed by: SURGERY

## 2021-10-24 PROCEDURE — 25010000002 HEPARIN (PORCINE) PER 1000 UNITS: Performed by: FAMILY MEDICINE

## 2021-10-24 PROCEDURE — 80053 COMPREHEN METABOLIC PANEL: CPT | Performed by: FAMILY MEDICINE

## 2021-10-24 PROCEDURE — 99232 SBSQ HOSP IP/OBS MODERATE 35: CPT | Performed by: SURGERY

## 2021-10-24 PROCEDURE — 85025 COMPLETE CBC W/AUTO DIFF WBC: CPT | Performed by: FAMILY MEDICINE

## 2021-10-24 RX ORDER — NICOTINE 21 MG/24HR
1 PATCH, TRANSDERMAL 24 HOURS TRANSDERMAL
Status: DISCONTINUED | OUTPATIENT
Start: 2021-10-24 | End: 2021-10-26 | Stop reason: HOSPADM

## 2021-10-24 RX ADMIN — HEPARIN SODIUM 1700 UNITS/HR: 10000 INJECTION, SOLUTION INTRAVENOUS at 05:15

## 2021-10-24 RX ADMIN — HYDROCODONE BITARTRATE AND ACETAMINOPHEN 1 TABLET: 5; 325 TABLET ORAL at 16:43

## 2021-10-24 RX ADMIN — HEPARIN SODIUM 7330 UNITS: 1000 INJECTION, SOLUTION INTRAVENOUS; SUBCUTANEOUS at 20:55

## 2021-10-24 RX ADMIN — NICOTINE 1 PATCH: 14 PATCH, EXTENDED RELEASE TRANSDERMAL at 10:45

## 2021-10-24 RX ADMIN — HEPARIN SODIUM 1600 UNITS/HR: 10000 INJECTION, SOLUTION INTRAVENOUS at 19:26

## 2021-10-25 ENCOUNTER — APPOINTMENT (OUTPATIENT)
Dept: CARDIOLOGY | Facility: HOSPITAL | Age: 41
End: 2021-10-25

## 2021-10-25 LAB
ALBUMIN SERPL-MCNC: 3.6 G/DL (ref 3.5–5.2)
ALBUMIN/GLOB SERPL: 1.6 G/DL
ALP SERPL-CCNC: 56 U/L (ref 39–117)
ALT SERPL W P-5'-P-CCNC: 5 U/L (ref 1–33)
ANION GAP SERPL CALCULATED.3IONS-SCNC: 10 MMOL/L (ref 5–15)
APTT PPP: 156.9 SECONDS (ref 24.1–35)
APTT PPP: 89.7 SECONDS (ref 24.1–35)
AST SERPL-CCNC: 13 U/L (ref 1–32)
BASOPHILS # BLD AUTO: 0.09 10*3/MM3 (ref 0–0.2)
BASOPHILS NFR BLD AUTO: 1 % (ref 0–1.5)
BH CV ECHO MEAS - AO MAX PG (FULL): 1.5 MMHG
BH CV ECHO MEAS - AO MAX PG: 15.7 MMHG
BH CV ECHO MEAS - AO MEAN PG (FULL): 2 MMHG
BH CV ECHO MEAS - AO MEAN PG: 8 MMHG
BH CV ECHO MEAS - AO ROOT AREA (BSA CORRECTED): 1.4
BH CV ECHO MEAS - AO ROOT AREA: 7.1 CM^2
BH CV ECHO MEAS - AO ROOT DIAM: 3 CM
BH CV ECHO MEAS - AO V2 MAX: 198 CM/SEC
BH CV ECHO MEAS - AO V2 MEAN: 128 CM/SEC
BH CV ECHO MEAS - AO V2 VTI: 35 CM
BH CV ECHO MEAS - AVA(I,A): 3.8 CM^2
BH CV ECHO MEAS - AVA(I,D): 3.8 CM^2
BH CV ECHO MEAS - AVA(V,A): 3.6 CM^2
BH CV ECHO MEAS - AVA(V,D): 3.6 CM^2
BH CV ECHO MEAS - BSA(HAYCOCK): 2.1 M^2
BH CV ECHO MEAS - BSA: 2.1 M^2
BH CV ECHO MEAS - BZI_BMI: 27.5 KILOGRAMS/M^2
BH CV ECHO MEAS - BZI_METRIC_HEIGHT: 180.3 CM
BH CV ECHO MEAS - BZI_METRIC_WEIGHT: 89.4 KG
BH CV ECHO MEAS - EDV(CUBED): 84 ML
BH CV ECHO MEAS - EDV(MOD-SP4): 94.6 ML
BH CV ECHO MEAS - EDV(TEICH): 86.8 ML
BH CV ECHO MEAS - EF(CUBED): 42.6 %
BH CV ECHO MEAS - EF(MOD-SP4): 55.1 %
BH CV ECHO MEAS - EF(TEICH): 35.6 %
BH CV ECHO MEAS - ESV(CUBED): 48.2 ML
BH CV ECHO MEAS - ESV(MOD-SP4): 42.5 ML
BH CV ECHO MEAS - ESV(TEICH): 55.9 ML
BH CV ECHO MEAS - FS: 16.9 %
BH CV ECHO MEAS - IVS/LVPW: 1.2
BH CV ECHO MEAS - IVSD: 0.94 CM
BH CV ECHO MEAS - LA DIMENSION: 2.8 CM
BH CV ECHO MEAS - LA/AO: 0.93
BH CV ECHO MEAS - LAT PEAK E' VEL: 12.5 CM/SEC
BH CV ECHO MEAS - LV DIASTOLIC VOL/BSA (35-75): 45.1 ML/M^2
BH CV ECHO MEAS - LV MASS(C)D: 122.9 GRAMS
BH CV ECHO MEAS - LV MASS(C)DI: 58.7 GRAMS/M^2
BH CV ECHO MEAS - LV MAX PG: 14.1 MMHG
BH CV ECHO MEAS - LV MEAN PG: 6 MMHG
BH CV ECHO MEAS - LV SYSTOLIC VOL/BSA (12-30): 20.3 ML/M^2
BH CV ECHO MEAS - LV V1 MAX: 188 CM/SEC
BH CV ECHO MEAS - LV V1 MEAN: 110 CM/SEC
BH CV ECHO MEAS - LV V1 VTI: 34.8 CM
BH CV ECHO MEAS - LVIDD: 4.4 CM
BH CV ECHO MEAS - LVIDS: 3.6 CM
BH CV ECHO MEAS - LVLD AP4: 8.8 CM
BH CV ECHO MEAS - LVLS AP4: 7.1 CM
BH CV ECHO MEAS - LVOT AREA (M): 3.8 CM^2
BH CV ECHO MEAS - LVOT AREA: 3.8 CM^2
BH CV ECHO MEAS - LVOT DIAM: 2.2 CM
BH CV ECHO MEAS - LVPWD: 0.81 CM
BH CV ECHO MEAS - MED PEAK E' VEL: 9.68 CM/SEC
BH CV ECHO MEAS - MV A MAX VEL: 87.8 CM/SEC
BH CV ECHO MEAS - MV DEC TIME: 0.32 SEC
BH CV ECHO MEAS - MV E MAX VEL: 95.6 CM/SEC
BH CV ECHO MEAS - MV E/A: 1.1
BH CV ECHO MEAS - RAP SYSTOLE: 5 MMHG
BH CV ECHO MEAS - RVSP: 27.8 MMHG
BH CV ECHO MEAS - SI(AO): 118.1 ML/M^2
BH CV ECHO MEAS - SI(CUBED): 17.1 ML/M^2
BH CV ECHO MEAS - SI(LVOT): 63.1 ML/M^2
BH CV ECHO MEAS - SI(MOD-SP4): 24.9 ML/M^2
BH CV ECHO MEAS - SI(TEICH): 14.7 ML/M^2
BH CV ECHO MEAS - SV(AO): 247.4 ML
BH CV ECHO MEAS - SV(CUBED): 35.8 ML
BH CV ECHO MEAS - SV(LVOT): 132.3 ML
BH CV ECHO MEAS - SV(MOD-SP4): 52.1 ML
BH CV ECHO MEAS - SV(TEICH): 30.9 ML
BH CV ECHO MEAS - TR MAX VEL: 239 CM/SEC
BH CV ECHO MEASUREMENTS AVERAGE E/E' RATIO: 8.62
BILIRUB SERPL-MCNC: 0.2 MG/DL (ref 0–1.2)
BUN SERPL-MCNC: 13 MG/DL (ref 6–20)
BUN/CREAT SERPL: 17.1 (ref 7–25)
CALCIUM SPEC-SCNC: 8.8 MG/DL (ref 8.6–10.5)
CHLORIDE SERPL-SCNC: 107 MMOL/L (ref 98–107)
CO2 SERPL-SCNC: 25 MMOL/L (ref 22–29)
CREAT SERPL-MCNC: 0.76 MG/DL (ref 0.57–1)
DEPRECATED RDW RBC AUTO: 47 FL (ref 37–54)
EOSINOPHIL # BLD AUTO: 0.39 10*3/MM3 (ref 0–0.4)
EOSINOPHIL NFR BLD AUTO: 4.2 % (ref 0.3–6.2)
ERYTHROCYTE [DISTWIDTH] IN BLOOD BY AUTOMATED COUNT: 17.2 % (ref 12.3–15.4)
GFR SERPL CREATININE-BSD FRML MDRD: 84 ML/MIN/1.73
GLOBULIN UR ELPH-MCNC: 2.3 GM/DL
GLUCOSE SERPL-MCNC: 94 MG/DL (ref 65–99)
HCT VFR BLD AUTO: 31.4 % (ref 34–46.6)
HGB BLD-MCNC: 8.9 G/DL (ref 12–15.9)
IMM GRANULOCYTES # BLD AUTO: 0.02 10*3/MM3 (ref 0–0.05)
IMM GRANULOCYTES NFR BLD AUTO: 0.2 % (ref 0–0.5)
LEFT ATRIUM VOLUME INDEX: 19.7 ML/M2
LEFT ATRIUM VOLUME: 41.3 CM3
LV EF 2D ECHO EST: 60 %
LYMPHOCYTES # BLD AUTO: 3.8 10*3/MM3 (ref 0.7–3.1)
LYMPHOCYTES NFR BLD AUTO: 41.3 % (ref 19.6–45.3)
MAXIMAL PREDICTED HEART RATE: 179 BPM
MCH RBC QN AUTO: 21.6 PG (ref 26.6–33)
MCHC RBC AUTO-ENTMCNC: 28.3 G/DL (ref 31.5–35.7)
MCV RBC AUTO: 76.2 FL (ref 79–97)
MONOCYTES # BLD AUTO: 0.74 10*3/MM3 (ref 0.1–0.9)
MONOCYTES NFR BLD AUTO: 8.1 % (ref 5–12)
NEUTROPHILS NFR BLD AUTO: 4.15 10*3/MM3 (ref 1.7–7)
NEUTROPHILS NFR BLD AUTO: 45.2 % (ref 42.7–76)
NRBC BLD AUTO-RTO: 0 /100 WBC (ref 0–0.2)
PLATELET # BLD AUTO: 321 10*3/MM3 (ref 140–450)
PMV BLD AUTO: 11.1 FL (ref 6–12)
POTASSIUM SERPL-SCNC: 3.7 MMOL/L (ref 3.5–5.2)
PROT SERPL-MCNC: 5.9 G/DL (ref 6–8.5)
RBC # BLD AUTO: 4.12 10*6/MM3 (ref 3.77–5.28)
SODIUM SERPL-SCNC: 142 MMOL/L (ref 136–145)
STRESS TARGET HR: 152 BPM
WBC # BLD AUTO: 9.19 10*3/MM3 (ref 3.4–10.8)

## 2021-10-25 PROCEDURE — 93306 TTE W/DOPPLER COMPLETE: CPT

## 2021-10-25 PROCEDURE — 99232 SBSQ HOSP IP/OBS MODERATE 35: CPT | Performed by: SURGERY

## 2021-10-25 PROCEDURE — 80053 COMPREHEN METABOLIC PANEL: CPT | Performed by: FAMILY MEDICINE

## 2021-10-25 PROCEDURE — 25010000002 HEPARIN (PORCINE) PER 1000 UNITS: Performed by: FAMILY MEDICINE

## 2021-10-25 PROCEDURE — 85730 THROMBOPLASTIN TIME PARTIAL: CPT | Performed by: SURGERY

## 2021-10-25 PROCEDURE — 93306 TTE W/DOPPLER COMPLETE: CPT | Performed by: INTERNAL MEDICINE

## 2021-10-25 PROCEDURE — 85025 COMPLETE CBC W/AUTO DIFF WBC: CPT | Performed by: FAMILY MEDICINE

## 2021-10-25 RX ORDER — ASPIRIN 81 MG/1
81 TABLET ORAL DAILY
Status: DISCONTINUED | OUTPATIENT
Start: 2021-10-25 | End: 2021-10-26 | Stop reason: HOSPADM

## 2021-10-25 RX ADMIN — HYDROCODONE BITARTRATE AND ACETAMINOPHEN 1 TABLET: 5; 325 TABLET ORAL at 15:39

## 2021-10-25 RX ADMIN — NICOTINE 1 PATCH: 14 PATCH, EXTENDED RELEASE TRANSDERMAL at 09:29

## 2021-10-25 RX ADMIN — APIXABAN 10 MG: 5 TABLET, FILM COATED ORAL at 20:34

## 2021-10-25 RX ADMIN — ASPIRIN 81 MG: 81 TABLET, COATED ORAL at 15:24

## 2021-10-25 RX ADMIN — HYDROCODONE BITARTRATE AND ACETAMINOPHEN 1 TABLET: 5; 325 TABLET ORAL at 07:26

## 2021-10-25 RX ADMIN — APIXABAN 10 MG: 5 TABLET, FILM COATED ORAL at 15:24

## 2021-10-25 RX ADMIN — HEPARIN SODIUM 1600 UNITS/HR: 10000 INJECTION, SOLUTION INTRAVENOUS at 12:09

## 2021-10-26 VITALS
OXYGEN SATURATION: 96 % | DIASTOLIC BLOOD PRESSURE: 59 MMHG | WEIGHT: 197 LBS | HEIGHT: 71 IN | TEMPERATURE: 98.2 F | HEART RATE: 76 BPM | SYSTOLIC BLOOD PRESSURE: 120 MMHG | BODY MASS INDEX: 27.58 KG/M2 | RESPIRATION RATE: 16 BRPM

## 2021-10-26 LAB
ALBUMIN SERPL-MCNC: 3.6 G/DL (ref 3.5–5.2)
ALBUMIN/GLOB SERPL: 1.7 G/DL
ALP SERPL-CCNC: 58 U/L (ref 39–117)
ALT SERPL W P-5'-P-CCNC: 5 U/L (ref 1–33)
ANION GAP SERPL CALCULATED.3IONS-SCNC: 9 MMOL/L (ref 5–15)
AST SERPL-CCNC: 8 U/L (ref 1–32)
BASOPHILS # BLD AUTO: 0.07 10*3/MM3 (ref 0–0.2)
BASOPHILS NFR BLD AUTO: 1 % (ref 0–1.5)
BILIRUB SERPL-MCNC: 0.2 MG/DL (ref 0–1.2)
BUN SERPL-MCNC: 10 MG/DL (ref 6–20)
BUN/CREAT SERPL: 13.7 (ref 7–25)
CALCIUM SPEC-SCNC: 8.7 MG/DL (ref 8.6–10.5)
CHLORIDE SERPL-SCNC: 107 MMOL/L (ref 98–107)
CHOLEST SERPL-MCNC: 174 MG/DL (ref 0–200)
CO2 SERPL-SCNC: 25 MMOL/L (ref 22–29)
CREAT SERPL-MCNC: 0.73 MG/DL (ref 0.57–1)
DEPRECATED RDW RBC AUTO: 46.7 FL (ref 37–54)
EOSINOPHIL # BLD AUTO: 0.32 10*3/MM3 (ref 0–0.4)
EOSINOPHIL NFR BLD AUTO: 4.6 % (ref 0.3–6.2)
ERYTHROCYTE [DISTWIDTH] IN BLOOD BY AUTOMATED COUNT: 16.7 % (ref 12.3–15.4)
GFR SERPL CREATININE-BSD FRML MDRD: 88 ML/MIN/1.73
GLOBULIN UR ELPH-MCNC: 2.1 GM/DL
GLUCOSE SERPL-MCNC: 97 MG/DL (ref 65–99)
HBA1C MFR BLD: 5.5 % (ref 4.8–5.6)
HCT VFR BLD AUTO: 31.1 % (ref 34–46.6)
HDLC SERPL-MCNC: 40 MG/DL (ref 40–60)
HGB BLD-MCNC: 9.1 G/DL (ref 12–15.9)
IMM GRANULOCYTES # BLD AUTO: 0.02 10*3/MM3 (ref 0–0.05)
IMM GRANULOCYTES NFR BLD AUTO: 0.3 % (ref 0–0.5)
LDLC SERPL CALC-MCNC: 109 MG/DL (ref 0–100)
LDLC/HDLC SERPL: 2.66 {RATIO}
LYMPHOCYTES # BLD AUTO: 1.69 10*3/MM3 (ref 0.7–3.1)
LYMPHOCYTES NFR BLD AUTO: 24.5 % (ref 19.6–45.3)
MCH RBC QN AUTO: 22.5 PG (ref 26.6–33)
MCHC RBC AUTO-ENTMCNC: 29.3 G/DL (ref 31.5–35.7)
MCV RBC AUTO: 77 FL (ref 79–97)
MONOCYTES # BLD AUTO: 0.68 10*3/MM3 (ref 0.1–0.9)
MONOCYTES NFR BLD AUTO: 9.9 % (ref 5–12)
NEUTROPHILS NFR BLD AUTO: 4.12 10*3/MM3 (ref 1.7–7)
NEUTROPHILS NFR BLD AUTO: 59.7 % (ref 42.7–76)
NRBC BLD AUTO-RTO: 0 /100 WBC (ref 0–0.2)
PLATELET # BLD AUTO: 314 10*3/MM3 (ref 140–450)
PMV BLD AUTO: 10.3 FL (ref 6–12)
POTASSIUM SERPL-SCNC: 3.9 MMOL/L (ref 3.5–5.2)
PROT SERPL-MCNC: 5.7 G/DL (ref 6–8.5)
RBC # BLD AUTO: 4.04 10*6/MM3 (ref 3.77–5.28)
SODIUM SERPL-SCNC: 141 MMOL/L (ref 136–145)
TRIGL SERPL-MCNC: 139 MG/DL (ref 0–150)
TSH SERPL DL<=0.05 MIU/L-ACNC: 2.97 UIU/ML (ref 0.27–4.2)
VLDLC SERPL-MCNC: 25 MG/DL (ref 5–40)
WBC # BLD AUTO: 6.9 10*3/MM3 (ref 3.4–10.8)

## 2021-10-26 PROCEDURE — 80061 LIPID PANEL: CPT | Performed by: FAMILY MEDICINE

## 2021-10-26 PROCEDURE — 84443 ASSAY THYROID STIM HORMONE: CPT | Performed by: FAMILY MEDICINE

## 2021-10-26 PROCEDURE — 83036 HEMOGLOBIN GLYCOSYLATED A1C: CPT | Performed by: FAMILY MEDICINE

## 2021-10-26 PROCEDURE — 80053 COMPREHEN METABOLIC PANEL: CPT | Performed by: FAMILY MEDICINE

## 2021-10-26 PROCEDURE — 85025 COMPLETE CBC W/AUTO DIFF WBC: CPT | Performed by: FAMILY MEDICINE

## 2021-10-26 RX ORDER — ASPIRIN 81 MG/1
81 TABLET ORAL DAILY
Qty: 90 TABLET | Refills: 1 | Status: ON HOLD | OUTPATIENT
Start: 2021-10-27 | End: 2022-11-04 | Stop reason: SDUPTHER

## 2021-10-26 RX ORDER — ROSUVASTATIN CALCIUM 5 MG/1
5 TABLET, COATED ORAL DAILY
Qty: 90 TABLET | Refills: 1 | Status: SHIPPED | OUTPATIENT
Start: 2021-10-26 | End: 2023-01-09

## 2021-10-26 RX ORDER — NICOTINE 21 MG/24HR
1 PATCH, TRANSDERMAL 24 HOURS TRANSDERMAL
Qty: 30 PATCH | Refills: 0 | Status: SHIPPED | OUTPATIENT
Start: 2021-10-27 | End: 2022-06-15

## 2021-10-26 RX ADMIN — ASPIRIN 81 MG: 81 TABLET, COATED ORAL at 09:23

## 2021-10-26 RX ADMIN — NICOTINE 1 PATCH: 14 PATCH, EXTENDED RELEASE TRANSDERMAL at 09:24

## 2021-10-26 RX ADMIN — APIXABAN 10 MG: 5 TABLET, FILM COATED ORAL at 09:23

## 2021-10-27 ENCOUNTER — READMISSION MANAGEMENT (OUTPATIENT)
Dept: CALL CENTER | Facility: HOSPITAL | Age: 41
End: 2021-10-27

## 2021-10-27 NOTE — OUTREACH NOTE
Prep Survey      Responses   [facility] facility patient discharged from? [facility]   Is LACE score < 7 ? No   Emergency Room discharge w/ pulse ox? No   Eligibility Readm Mgmt   Discharge diagnosis Peripheral vascular disease   Does the patient have one of the following disease processes/diagnoses(primary or secondary)? Other   Does the patient have Home health ordered? No   Is there a DME ordered? No   Prep survey completed? Yes          Katrina Chandler RN

## 2021-10-28 LAB
BACTERIA SPEC AEROBE CULT: NORMAL
BACTERIA SPEC AEROBE CULT: NORMAL

## 2021-10-29 ENCOUNTER — READMISSION MANAGEMENT (OUTPATIENT)
Dept: CALL CENTER | Facility: HOSPITAL | Age: 41
End: 2021-10-29

## 2021-10-29 NOTE — OUTREACH NOTE
Medical Week 1 Survey      Responses   [facility] patient discharged from? [place]   Does the patient have one of the following disease processes/diagnoses(primary or secondary)? Other   Week 1 attempt successful? Yes   Call start time 1437   Call end time 1440   Discharge diagnosis Peripheral vascular disease   Meds reviewed with patient/caregiver? Yes   Is the patient having any side effects they believe may be caused by any medication additions or changes? No   Does the patient have all medications ordered at discharge? Yes   Is the patient taking all medications as directed (includes completed medication regime)? Yes   Comments regarding appointments f/u with vascular surgery on 11/26   Does the patient have a primary care provider?  Yes   Does the patient have an appointment with their PCP within 7 days of discharge? No   Comments regarding PCP says she will make appt with her PCP   What is preventing the patient from scheduling follow up appointments within 7 days of discharge? Haven't had time   Nursing Interventions Advised patient to make appointment   Has the patient kept scheduled appointments due by today? N/A   Has home health visited the patient within 72 hours of discharge? N/A   Psychosocial issues? No   Did the patient receive a copy of their discharge instructions? Yes   Nursing interventions Reviewed instructions with patient   What is the patient's perception of their health status since discharge? Improving   Is the patient/caregiver able to teach back the hierarchy of who to call/visit for symptoms/problems? PCP, Specialist, Home health nurse, Urgent Care, ED, 911 Yes   Week 1 call completed? Yes   Wrap up additional comments Says she is doing well, still has 2 fingers that feel cold but that has improved from when she was in hospital, says she will make a f/u appt with PCP soon, confirmed f/u appt with vascular doctor for 11/26.          Bernice Escamilla RN

## 2021-11-01 ENCOUNTER — TELEPHONE (OUTPATIENT)
Dept: VASCULAR SURGERY | Facility: CLINIC | Age: 41
End: 2021-11-01

## 2021-11-01 NOTE — TELEPHONE ENCOUNTER
CHANGED APPT UNTIL NOV 22 DUE TO THE OFFICE BEING CLOSED ON NOV 26. WILL MAIL APPT  REMINDER TO PT CANNOT LEAVE VM

## 2021-11-05 ENCOUNTER — READMISSION MANAGEMENT (OUTPATIENT)
Dept: CALL CENTER | Facility: HOSPITAL | Age: 41
End: 2021-11-05

## 2021-11-05 NOTE — OUTREACH NOTE
Medical Week 2 Survey      Responses   [facility] patient discharged from? [place]   Does the patient have one of the following disease processes/diagnoses(primary or secondary)? Other   Week 2 attempt successful? Yes   Call start time 1715   Discharge diagnosis Peripheral vascular disease   Call end time 1723   Medication alerts for this patient ELIQUIS-ASA   Meds reviewed with patient/caregiver? Yes   Is the patient having any side effects they believe may be caused by any medication additions or changes? No   Does the patient have all medications ordered at discharge? Yes   Is the patient taking all medications as directed (includes completed medication regime)? Yes   Comments regarding appointments f/u with vascular surgery on 11/26   Does the patient have a primary care provider?  No   Does the patient have an appointment with their PCP within 7 days of discharge? No   Comments regarding PCP Pt does not have a PCP due to some insurance issues and waiting to get things switched over--enc'd to complete as she will soon need refills on her new medications, she v/u   Nursing Interventions Advised patient to make appointment,  Educated patient on importance of making appointment   Has the patient kept scheduled appointments due by today? N/A   Has home health visited the patient within 72 hours of discharge? N/A   Psychosocial issues? No   Did the patient receive a copy of their discharge instructions? Yes   Nursing interventions Reviewed instructions with patient   What is the patient's perception of their health status since discharge? Improving  [Pt reports much improvement--no more numbness/tinglling, hand/fingers are pink and warm and no further issues with dizziness.  Pt is motivated for change--taking her medications, changed her diet and working on smoking cessation. ]   Is the patient/caregiver able to teach back signs and symptoms related to disease process for when to call PCP? Yes   Is the  "patient/caregiver able to teach back signs and symptoms related to disease process for when to call 911? Yes   Is the patient/caregiver able to teach back the hierarchy of who to call/visit for symptoms/problems? PCP, Specialist, Home health nurse, Urgent Care, ED, 911 Yes   If the patient is a current smoker, are they able to teach back resources for cessation? Smoking cessation medications  [\"slowed down drastically\"]   Week 2 Call Completed? Yes          Nida Sotomayor RN  "

## 2021-11-15 ENCOUNTER — READMISSION MANAGEMENT (OUTPATIENT)
Dept: CALL CENTER | Facility: HOSPITAL | Age: 41
End: 2021-11-15

## 2021-11-15 NOTE — OUTREACH NOTE
Medical Week 3 Survey      Responses   [facility] patient discharged from? [place]   Does the patient have one of the following disease processes/diagnoses(primary or secondary)? Other   Week 3 attempt successful? Yes   Call start time 1554   Call end time 1559   Discharge diagnosis Peripheral vascular disease   Meds reviewed with patient/caregiver? Yes   Is the patient having any side effects they believe may be caused by any medication additions or changes? No   Does the patient have all medications ordered at discharge? Yes   Is the patient taking all medications as directed (includes completed medication regime)? Yes   Comments regarding appointments f/u with vascular surgery on 11/22   Comments regarding PCP New PCP is: Louisa Schumacher   Does the patient have an appointment with their PCP within 7 days of discharge? Greater than 7 days   What is preventing the patient from scheduling follow up appointments within 7 days of discharge? Earlier appointment not available   Nursing Interventions Educated patient on importance of making appointment   Has the patient kept scheduled appointments due by today? N/A   Psychosocial issues? No   Comments Pt c/o feeling weak/tired constantly. Denies further arm pain or numbness or tingling.   What is the patient's perception of their health status since discharge? Improving   If the patient is a current smoker, are they able to teach back resources for cessation? --  [Pt slowed down to to 1/2pk/day she used to smoke over 1pk/day.]   Week 3 Call Completed? Yes   Wrap up additional comments .          Payton Hernandez, RN

## 2021-11-17 ENCOUNTER — OFFICE VISIT (OUTPATIENT)
Dept: INTERNAL MEDICINE | Facility: CLINIC | Age: 41
End: 2021-11-17

## 2021-11-17 VITALS
TEMPERATURE: 98.6 F | OXYGEN SATURATION: 97 % | BODY MASS INDEX: 29.65 KG/M2 | WEIGHT: 211.8 LBS | DIASTOLIC BLOOD PRESSURE: 81 MMHG | SYSTOLIC BLOOD PRESSURE: 112 MMHG | HEART RATE: 76 BPM | RESPIRATION RATE: 18 BRPM | HEIGHT: 71 IN

## 2021-11-17 DIAGNOSIS — Z79.899 HIGH RISK MEDICATION USE: ICD-10-CM

## 2021-11-17 DIAGNOSIS — F51.01 PRIMARY INSOMNIA: ICD-10-CM

## 2021-11-17 DIAGNOSIS — F19.11 HISTORY OF DRUG ABUSE (HCC): ICD-10-CM

## 2021-11-17 DIAGNOSIS — R53.83 FATIGUE, UNSPECIFIED TYPE: ICD-10-CM

## 2021-11-17 DIAGNOSIS — D50.0 IRON DEFICIENCY ANEMIA DUE TO CHRONIC BLOOD LOSS: ICD-10-CM

## 2021-11-17 DIAGNOSIS — N63.10 MASS OF RIGHT BREAST: Primary | ICD-10-CM

## 2021-11-17 DIAGNOSIS — I73.9 PERIPHERAL VASCULAR DISEASE (HCC): ICD-10-CM

## 2021-11-17 PROCEDURE — 99214 OFFICE O/P EST MOD 30 MIN: CPT | Performed by: FAMILY MEDICINE

## 2021-11-17 RX ORDER — SYRINGE W-NEEDLE,DISPOSAB,3 ML 25GX5/8"
1 SYRINGE, EMPTY DISPOSABLE MISCELLANEOUS
Qty: 1 EACH | Refills: 3 | Status: SHIPPED | OUTPATIENT
Start: 2021-11-17 | End: 2022-01-10 | Stop reason: SDUPTHER

## 2021-11-17 RX ORDER — LEVOCETIRIZINE DIHYDROCHLORIDE 5 MG/1
5 TABLET, FILM COATED ORAL EVERY EVENING
Qty: 30 TABLET | Refills: 1 | Status: SHIPPED | OUTPATIENT
Start: 2021-11-17 | End: 2022-01-11 | Stop reason: SDUPTHER

## 2021-11-17 RX ORDER — CYANOCOBALAMIN 1000 UG/ML
1000 INJECTION, SOLUTION INTRAMUSCULAR; SUBCUTANEOUS
Qty: 10 ML | Refills: 0 | Status: SHIPPED | OUTPATIENT
Start: 2021-11-17 | End: 2022-01-10 | Stop reason: SDUPTHER

## 2021-11-17 RX ORDER — TRAZODONE HYDROCHLORIDE 50 MG/1
50 TABLET ORAL NIGHTLY
Qty: 30 TABLET | Refills: 1 | Status: SHIPPED | OUTPATIENT
Start: 2021-11-17 | End: 2022-01-11 | Stop reason: SDUPTHER

## 2021-11-17 RX ORDER — POLYETHYLENE GLYCOL 3350 17 G
2 POWDER IN PACKET (EA) ORAL AS NEEDED
Qty: 27 EACH | Refills: 1 | Status: SHIPPED | OUTPATIENT
Start: 2021-11-17 | End: 2022-06-15

## 2021-11-17 RX ORDER — ESCITALOPRAM OXALATE 10 MG/1
10 TABLET ORAL DAILY
COMMUNITY
End: 2022-06-15

## 2021-11-17 RX ORDER — CYCLOBENZAPRINE HCL 10 MG
10 TABLET ORAL 2 TIMES DAILY PRN
Qty: 60 TABLET | Refills: 0 | Status: SHIPPED | OUTPATIENT
Start: 2021-11-17 | End: 2022-01-11 | Stop reason: SDUPTHER

## 2021-11-17 NOTE — PROGRESS NOTES
Subjective     Chief Complaint   Patient presents with   • Blood clot     Establishing care.    • Breast Mass       History of Present Illness  Patient presents to the office as a new patient.  She wishes to establish care.  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  At time she was placed on Eliquis.  Vascular surgery did see her while hospitalized.  She will follow back with them next week.  Patient also has complaints of a right breast mass.  This is been there for probably around 3 months that she knows of.  She has never had an evaluation of it.  It is not really painful.  Patient also is having issues with sleeping.  Apparently last year her  committed suicide.  She did find the body.  And she has been dealing with some posttraumatic stress from this.  She is also concerned that she has gained a lot of weight.  Wants something to get the weight off.  Patient also complains of neck muscle pain and stiffness.  She previously had taken cyclobenzaprine and would like to try this again.  Patient also complains of having no energy.  She would like to resume her B12 injections.    Hospital chart reviewed.     Patient's PMR from outside medical facility reviewed and noted.    Review of Systems     Otherwise complete ROS reviewed and negative except as mentioned in the HPI.    Past Medical History:   Past Medical History:   Diagnosis Date   • Asthma      Past Surgical History:  Past Surgical History:   Procedure Laterality Date   •  SECTION     • TONSILLECTOMY     • TUBAL ABDOMINAL LIGATION       Social History:  reports that she has been smoking cigarettes. She has been smoking about 0.50 packs per day. She has never used smokeless tobacco. She reports previous alcohol use. She reports previous drug use.    Family History: family history includes Anxiety disorder in her mother; Depression in her mother; Heart disease in her father.    "    Allergies:  Allergies   Allergen Reactions   • Penicillins Hives     Medications:  Prior to Admission medications    Medication Sig Start Date End Date Taking? Authorizing Provider   albuterol sulfate  (90 Base) MCG/ACT inhaler Inhale 2 puffs Every 4 (Four) Hours As Needed for Wheezing or Shortness of Air. 2/9/21  Yes Jagjit Foster PA-C   apixaban (ELIQUIS) 5 MG tablet tablet Take 2 tablets by mouth Every 12 (Twelve) Hours for 6 days, THEN 1 tablet Every 12 (Twelve) Hours Indications: DVT/PE (active thrombosis) 11/1/21 12/7/21 Yes Den Agarwal MD   aspirin 81 MG EC tablet Take 1 tablet by mouth Daily. 10/27/21  Yes Den Agarwal MD   escitalopram (LEXAPRO) 10 MG tablet Take 10 mg by mouth Daily.   Yes ProviderLuis MD   rosuvastatin (Crestor) 5 MG tablet Take 1 tablet by mouth Daily. 10/26/21  Yes Den Agarwal MD   nicotine (NICODERM CQ) 14 MG/24HR patch Place 1 patch on the skin as directed by provider Daily. 10/27/21   Den Agarwal MD       Objective     Vital Signs: /81 (BP Location: Left arm, Patient Position: Sitting, Cuff Size: Adult)   Pulse 76   Temp 98.6 °F (37 °C) (Skin)   Resp 18   Ht 180.3 cm (71\")   Wt 96.1 kg (211 lb 12.8 oz)   LMP 10/21/2021   SpO2 97%   BMI 29.54 kg/m²   Physical Exam  Vitals and nursing note reviewed.   Constitutional:       General: She is not in acute distress.     Appearance: Normal appearance. She is not ill-appearing, toxic-appearing or diaphoretic.   HENT:      Head: Normocephalic and atraumatic.      Right Ear: Tympanic membrane, ear canal and external ear normal.      Left Ear: Tympanic membrane, ear canal and external ear normal.      Nose: Nose normal.      Mouth/Throat:      Mouth: Mucous membranes are moist.      Pharynx: Oropharynx is clear.   Eyes:      Extraocular Movements: Extraocular movements intact.      Conjunctiva/sclera: Conjunctivae normal.      Pupils: Pupils are equal, round, and reactive to light. "   Cardiovascular:      Rate and Rhythm: Normal rate and regular rhythm.      Pulses: Normal pulses.      Heart sounds: Normal heart sounds.   Pulmonary:      Effort: Pulmonary effort is normal.      Breath sounds: Normal breath sounds.   Chest:          Comments: Large palpable mass that is posterior to the nipple.  Abdominal:      General: Bowel sounds are normal.      Palpations: Abdomen is soft.   Musculoskeletal:         General: Normal range of motion.      Cervical back: Normal range of motion and neck supple.      Comments: Neck muscles tight to palpation   Skin:     General: Skin is warm and dry.      Capillary Refill: Capillary refill takes less than 2 seconds.   Neurological:      General: No focal deficit present.      Mental Status: She is alert and oriented to person, place, and time.   Psychiatric:         Mood and Affect: Mood normal.         Behavior: Behavior normal.         Patient's Body mass index is 29.54 kg/m². indicating that she is overweight (BMI 25-29.9). Obesity-related health conditions include the following: peripheral vascular disease. Obesity is worsening. BMI is is above average; BMI management plan is completed. We discussed portion control and increasing exercise..      Results Reviewed:  Glucose   Date Value Ref Range Status   10/26/2021 97 65 - 99 mg/dL Final     BUN   Date Value Ref Range Status   10/26/2021 10 6 - 20 mg/dL Final     Creatinine   Date Value Ref Range Status   10/26/2021 0.73 0.57 - 1.00 mg/dL Final     Sodium   Date Value Ref Range Status   10/26/2021 141 136 - 145 mmol/L Final     Potassium   Date Value Ref Range Status   10/26/2021 3.9 3.5 - 5.2 mmol/L Final     Chloride   Date Value Ref Range Status   10/26/2021 107 98 - 107 mmol/L Final     CO2   Date Value Ref Range Status   10/26/2021 25.0 22.0 - 29.0 mmol/L Final     Calcium   Date Value Ref Range Status   10/26/2021 8.7 8.6 - 10.5 mg/dL Final     ALT (SGPT)   Date Value Ref Range Status   10/26/2021 5 1 -  33 U/L Final     AST (SGOT)   Date Value Ref Range Status   10/26/2021 8 1 - 32 U/L Final     WBC   Date Value Ref Range Status   10/26/2021 6.90 3.40 - 10.80 10*3/mm3 Final     Hematocrit   Date Value Ref Range Status   10/26/2021 31.1 (L) 34.0 - 46.6 % Final     Platelets   Date Value Ref Range Status   10/26/2021 314 140 - 450 10*3/mm3 Final     Total Cholesterol   Date Value Ref Range Status   10/26/2021 174 0 - 200 mg/dL Final     Triglycerides   Date Value Ref Range Status   10/26/2021 139 0 - 150 mg/dL Final     HDL Cholesterol   Date Value Ref Range Status   10/26/2021 40 40 - 60 mg/dL Final     LDL Cholesterol    Date Value Ref Range Status   10/26/2021 109 (H) 0 - 100 mg/dL Final     LDL/HDL Ratio   Date Value Ref Range Status   10/26/2021 2.66  Final     Hemoglobin A1C   Date Value Ref Range Status   10/26/2021 5.50 4.80 - 5.60 % Final         Assessment / Plan     Assessment/Plan:  1. Mass of right breast    - Mammo Diagnostic Bilateral With CAD; Future  - US Breast Right Complete; Future    2. High risk medication use    - CBC & Differential    3. Peripheral vascular disease (HCC)  Continue statin and elequis    4. Primary insomnia  Trazodone 50 mg 1/2 to 1 qhs prn insomnia    5. Fatigue, unspecified type  b12 injection 1000 mcg monthly    6. Iron deficiency anemia due to chronic blood loss  mvi with iron  Recheck cbc in one month    7.  History of drug abuse cocaine, methamphetamine        Return in about 4 weeks (around 12/15/2021). unless patient needs to be seen sooner or acute issues arise.        I have discussed the patient results/orders and and plan/recommendation with them at today's visit.      Elena Taylor, DO   11/17/2021

## 2021-11-19 ENCOUNTER — TELEPHONE (OUTPATIENT)
Dept: VASCULAR SURGERY | Facility: CLINIC | Age: 41
End: 2021-11-19

## 2021-11-22 ENCOUNTER — READMISSION MANAGEMENT (OUTPATIENT)
Dept: CALL CENTER | Facility: HOSPITAL | Age: 41
End: 2021-11-22

## 2021-11-22 DIAGNOSIS — I77.1 STENOSIS OF LEFT SUBCLAVIAN ARTERY (HCC): Primary | ICD-10-CM

## 2021-11-22 NOTE — OUTREACH NOTE
Medical Week 4 Survey      Responses   [facility] patient discharged from? [place]   Does the patient have one of the following disease processes/diagnoses(primary or secondary)? Other   Week 4 attempt successful? Yes   Call start time 1708   Call end time 1711   Discharge diagnosis Peripheral vascular disease   Meds reviewed with patient/caregiver? Yes   Is the patient taking all medications as directed (includes completed medication regime)? Yes   Has the patient kept scheduled appointments due by today? Yes   Comments Saw PCP Dr Taylor     Vascular surgeon rescheduled to 12/07/2021   Is the patient still receiving Home Health Services? N/A   What is the patient's perception of their health status since discharge? Improving   Week 4 Call Completed? Yes   Would the patient like one additional call? No   Graduated Yes   Is the patient interested in additional calls from an ambulatory ?  NOTE:  applies to high risk patients requiring additional follow-up. No   Did the patient feel the follow up calls were helpful during their recovery period? Yes          Haleigh Moser RN

## 2021-11-29 ENCOUNTER — APPOINTMENT (OUTPATIENT)
Dept: CT IMAGING | Facility: HOSPITAL | Age: 41
End: 2021-11-29

## 2021-11-29 ENCOUNTER — HOSPITAL ENCOUNTER (EMERGENCY)
Facility: HOSPITAL | Age: 41
Discharge: HOME | End: 2021-11-29
Admitting: EMERGENCY MEDICINE

## 2021-11-29 ENCOUNTER — APPOINTMENT (OUTPATIENT)
Dept: GENERAL RADIOLOGY | Facility: HOSPITAL | Age: 41
End: 2021-11-29

## 2021-11-29 ENCOUNTER — NURSE TRIAGE (OUTPATIENT)
Dept: CALL CENTER | Facility: HOSPITAL | Age: 41
End: 2021-11-29

## 2021-11-29 VITALS
RESPIRATION RATE: 16 BRPM | WEIGHT: 214 LBS | HEIGHT: 69 IN | TEMPERATURE: 98.1 F | SYSTOLIC BLOOD PRESSURE: 117 MMHG | BODY MASS INDEX: 31.7 KG/M2 | DIASTOLIC BLOOD PRESSURE: 65 MMHG | OXYGEN SATURATION: 100 % | HEART RATE: 71 BPM

## 2021-11-29 DIAGNOSIS — R42 DIZZINESS: Primary | ICD-10-CM

## 2021-11-29 DIAGNOSIS — D50.9 IRON DEFICIENCY ANEMIA, UNSPECIFIED IRON DEFICIENCY ANEMIA TYPE: ICD-10-CM

## 2021-11-29 DIAGNOSIS — Z79.01 LONG TERM (CURRENT) USE OF ANTICOAGULANTS: ICD-10-CM

## 2021-11-29 DIAGNOSIS — R91.1 RIGHT UPPER LOBE PULMONARY NODULE: ICD-10-CM

## 2021-11-29 LAB
ALBUMIN SERPL-MCNC: 3.8 G/DL (ref 3.5–5.2)
ALBUMIN/GLOB SERPL: 1.8 G/DL
ALP SERPL-CCNC: 57 U/L (ref 39–117)
ALT SERPL W P-5'-P-CCNC: 5 U/L (ref 1–33)
AMPHET+METHAMPHET UR QL: NEGATIVE
AMPHETAMINES UR QL: NEGATIVE
ANION GAP SERPL CALCULATED.3IONS-SCNC: 7 MMOL/L (ref 5–15)
APTT PPP: 26 SECONDS (ref 24.1–35)
ARTERIAL PATENCY WRIST A: POSITIVE
AST SERPL-CCNC: 11 U/L (ref 1–32)
ATMOSPHERIC PRESS: 752 MMHG
BARBITURATES UR QL SCN: NEGATIVE
BASE EXCESS BLDA CALC-SCNC: 1.2 MMOL/L (ref 0–2)
BASOPHILS # BLD AUTO: 0.03 10*3/MM3 (ref 0–0.2)
BASOPHILS NFR BLD AUTO: 0.5 % (ref 0–1.5)
BDY SITE: ABNORMAL
BENZODIAZ UR QL SCN: NEGATIVE
BILIRUB SERPL-MCNC: <0.2 MG/DL (ref 0–1.2)
BILIRUB UR QL STRIP: NEGATIVE
BODY TEMPERATURE: 37 C
BUN SERPL-MCNC: 10 MG/DL (ref 6–20)
BUN/CREAT SERPL: 14.3 (ref 7–25)
BUPRENORPHINE SERPL-MCNC: NEGATIVE NG/ML
CALCIUM SPEC-SCNC: 8.7 MG/DL (ref 8.6–10.5)
CANNABINOIDS SERPL QL: NEGATIVE
CHLORIDE SERPL-SCNC: 107 MMOL/L (ref 98–107)
CLARITY UR: ABNORMAL
CO2 SERPL-SCNC: 25 MMOL/L (ref 22–29)
COCAINE UR QL: NEGATIVE
COLOR UR: YELLOW
CREAT SERPL-MCNC: 0.7 MG/DL (ref 0.57–1)
D DIMER PPP FEU-MCNC: 0.22 MG/L (FEU) (ref 0–0.5)
D-LACTATE SERPL-SCNC: 0.7 MMOL/L (ref 0.5–2)
DEPRECATED RDW RBC AUTO: 48 FL (ref 37–54)
DEVELOPER EXPIRATION DATE: NORMAL
DEVELOPER LOT NUMBER: 203
EOSINOPHIL # BLD AUTO: 0.28 10*3/MM3 (ref 0–0.4)
EOSINOPHIL NFR BLD AUTO: 4.6 % (ref 0.3–6.2)
ERYTHROCYTE [DISTWIDTH] IN BLOOD BY AUTOMATED COUNT: 18 % (ref 12.3–15.4)
EXPIRATION DATE: NORMAL
FECAL OCCULT BLOOD SCREEN, POC: NEGATIVE
GFR SERPL CREATININE-BSD FRML MDRD: 92 ML/MIN/1.73
GLOBULIN UR ELPH-MCNC: 2.1 GM/DL
GLUCOSE SERPL-MCNC: 96 MG/DL (ref 65–99)
GLUCOSE UR STRIP-MCNC: NEGATIVE MG/DL
HCG SERPL QL: NEGATIVE
HCO3 BLDA-SCNC: 25.9 MMOL/L (ref 20–26)
HCT VFR BLD AUTO: 24.7 % (ref 34–46.6)
HCT VFR BLD AUTO: 27 % (ref 34–46.6)
HGB BLD-MCNC: 7 G/DL (ref 12–15.9)
HGB BLD-MCNC: 7.7 G/DL (ref 12–15.9)
HGB UR QL STRIP.AUTO: NEGATIVE
IMM GRANULOCYTES # BLD AUTO: 0.02 10*3/MM3 (ref 0–0.05)
IMM GRANULOCYTES NFR BLD AUTO: 0.3 % (ref 0–0.5)
INR PPP: 1.09 (ref 0.91–1.09)
IRON 24H UR-MRATE: 12 MCG/DL (ref 37–145)
IRON SATN MFR SERPL: 3 % (ref 20–50)
KETONES UR QL STRIP: NEGATIVE
LEUKOCYTE ESTERASE UR QL STRIP.AUTO: NEGATIVE
LYMPHOCYTES # BLD AUTO: 1.95 10*3/MM3 (ref 0.7–3.1)
LYMPHOCYTES NFR BLD AUTO: 32 % (ref 19.6–45.3)
Lab: 203
Lab: ABNORMAL
MAGNESIUM SERPL-MCNC: 1.8 MG/DL (ref 1.6–2.6)
MCH RBC QN AUTO: 21.5 PG (ref 26.6–33)
MCHC RBC AUTO-ENTMCNC: 28.5 G/DL (ref 31.5–35.7)
MCV RBC AUTO: 75.4 FL (ref 79–97)
METHADONE UR QL SCN: NEGATIVE
MODALITY: ABNORMAL
MONOCYTES # BLD AUTO: 0.52 10*3/MM3 (ref 0.1–0.9)
MONOCYTES NFR BLD AUTO: 8.5 % (ref 5–12)
NEGATIVE CONTROL: NEGATIVE
NEUTROPHILS NFR BLD AUTO: 3.3 10*3/MM3 (ref 1.7–7)
NEUTROPHILS NFR BLD AUTO: 54.1 % (ref 42.7–76)
NITRITE UR QL STRIP: NEGATIVE
NRBC BLD AUTO-RTO: 0 /100 WBC (ref 0–0.2)
OPIATES UR QL: NEGATIVE
OXYCODONE UR QL SCN: NEGATIVE
PCO2 BLDA: 40.5 MM HG (ref 35–45)
PCO2 TEMP ADJ BLD: 40.5 MM HG (ref 35–45)
PCP UR QL SCN: NEGATIVE
PH BLDA: 7.42 PH UNITS (ref 7.35–7.45)
PH UR STRIP.AUTO: 7 [PH] (ref 5–8)
PH, TEMP CORRECTED: 7.42 PH UNITS (ref 7.35–7.45)
PLATELET # BLD AUTO: 266 10*3/MM3 (ref 140–450)
PMV BLD AUTO: 11.9 FL (ref 6–12)
PO2 BLDA: 75.3 MM HG (ref 83–108)
PO2 TEMP ADJ BLD: 75.3 MM HG (ref 83–108)
POSITIVE CONTROL: POSITIVE
POTASSIUM SERPL-SCNC: 4.3 MMOL/L (ref 3.5–5.2)
PROCALCITONIN SERPL-MCNC: 0.03 NG/ML (ref 0–0.25)
PROPOXYPH UR QL: NEGATIVE
PROT SERPL-MCNC: 5.9 G/DL (ref 6–8.5)
PROT UR QL STRIP: NEGATIVE
PROTHROMBIN TIME: 13.7 SECONDS (ref 11.9–14.6)
RBC # BLD AUTO: 3.58 10*6/MM3 (ref 3.77–5.28)
SAO2 % BLDCOA: 96.3 % (ref 94–99)
SARS-COV-2 RNA PNL SPEC NAA+PROBE: NOT DETECTED
SODIUM SERPL-SCNC: 139 MMOL/L (ref 136–145)
SP GR UR STRIP: 1.01 (ref 1–1.03)
T4 FREE SERPL-MCNC: 0.7 NG/DL (ref 0.93–1.7)
TIBC SERPL-MCNC: 423 MCG/DL (ref 298–536)
TRANSFERRIN SERPL-MCNC: 284 MG/DL (ref 200–360)
TRICYCLICS UR QL SCN: NEGATIVE
TROPONIN T SERPL-MCNC: <0.01 NG/ML (ref 0–0.03)
TROPONIN T SERPL-MCNC: <0.01 NG/ML (ref 0–0.03)
TSH SERPL DL<=0.05 MIU/L-ACNC: 2.1 UIU/ML (ref 0.27–4.2)
UROBILINOGEN UR QL STRIP: ABNORMAL
VENTILATOR MODE: ABNORMAL
WBC NRBC COR # BLD: 6.1 10*3/MM3 (ref 3.4–10.8)

## 2021-11-29 PROCEDURE — 85379 FIBRIN DEGRADATION QUANT: CPT | Performed by: PHYSICIAN ASSISTANT

## 2021-11-29 PROCEDURE — 0 IOPAMIDOL PER 1 ML: Performed by: PHYSICIAN ASSISTANT

## 2021-11-29 PROCEDURE — 83540 ASSAY OF IRON: CPT | Performed by: PHYSICIAN ASSISTANT

## 2021-11-29 PROCEDURE — 70496 CT ANGIOGRAPHY HEAD: CPT

## 2021-11-29 PROCEDURE — 71045 X-RAY EXAM CHEST 1 VIEW: CPT

## 2021-11-29 PROCEDURE — 84145 PROCALCITONIN (PCT): CPT | Performed by: PHYSICIAN ASSISTANT

## 2021-11-29 PROCEDURE — 36600 WITHDRAWAL OF ARTERIAL BLOOD: CPT

## 2021-11-29 PROCEDURE — 72125 CT NECK SPINE W/O DYE: CPT

## 2021-11-29 PROCEDURE — 36415 COLL VENOUS BLD VENIPUNCTURE: CPT

## 2021-11-29 PROCEDURE — 99284 EMERGENCY DEPT VISIT MOD MDM: CPT

## 2021-11-29 PROCEDURE — 83605 ASSAY OF LACTIC ACID: CPT | Performed by: PHYSICIAN ASSISTANT

## 2021-11-29 PROCEDURE — 70450 CT HEAD/BRAIN W/O DYE: CPT

## 2021-11-29 PROCEDURE — 25010000002 ONDANSETRON PER 1 MG: Performed by: PHYSICIAN ASSISTANT

## 2021-11-29 PROCEDURE — 80306 DRUG TEST PRSMV INSTRMNT: CPT | Performed by: PHYSICIAN ASSISTANT

## 2021-11-29 PROCEDURE — 84466 ASSAY OF TRANSFERRIN: CPT | Performed by: PHYSICIAN ASSISTANT

## 2021-11-29 PROCEDURE — 85025 COMPLETE CBC W/AUTO DIFF WBC: CPT | Performed by: PHYSICIAN ASSISTANT

## 2021-11-29 PROCEDURE — 83735 ASSAY OF MAGNESIUM: CPT | Performed by: PHYSICIAN ASSISTANT

## 2021-11-29 PROCEDURE — 82270 OCCULT BLOOD FECES: CPT | Performed by: PHYSICIAN ASSISTANT

## 2021-11-29 PROCEDURE — 96374 THER/PROPH/DIAG INJ IV PUSH: CPT

## 2021-11-29 PROCEDURE — 82803 BLOOD GASES ANY COMBINATION: CPT

## 2021-11-29 PROCEDURE — 71275 CT ANGIOGRAPHY CHEST: CPT

## 2021-11-29 PROCEDURE — 87040 BLOOD CULTURE FOR BACTERIA: CPT | Performed by: PHYSICIAN ASSISTANT

## 2021-11-29 PROCEDURE — 84443 ASSAY THYROID STIM HORMONE: CPT | Performed by: PHYSICIAN ASSISTANT

## 2021-11-29 PROCEDURE — 80053 COMPREHEN METABOLIC PANEL: CPT | Performed by: PHYSICIAN ASSISTANT

## 2021-11-29 PROCEDURE — 84439 ASSAY OF FREE THYROXINE: CPT | Performed by: PHYSICIAN ASSISTANT

## 2021-11-29 PROCEDURE — 85018 HEMOGLOBIN: CPT | Performed by: PHYSICIAN ASSISTANT

## 2021-11-29 PROCEDURE — 87635 SARS-COV-2 COVID-19 AMP PRB: CPT | Performed by: PHYSICIAN ASSISTANT

## 2021-11-29 PROCEDURE — 81003 URINALYSIS AUTO W/O SCOPE: CPT | Performed by: PHYSICIAN ASSISTANT

## 2021-11-29 PROCEDURE — 85730 THROMBOPLASTIN TIME PARTIAL: CPT | Performed by: PHYSICIAN ASSISTANT

## 2021-11-29 PROCEDURE — 84703 CHORIONIC GONADOTROPIN ASSAY: CPT | Performed by: PHYSICIAN ASSISTANT

## 2021-11-29 PROCEDURE — 85610 PROTHROMBIN TIME: CPT | Performed by: PHYSICIAN ASSISTANT

## 2021-11-29 PROCEDURE — 85014 HEMATOCRIT: CPT | Performed by: PHYSICIAN ASSISTANT

## 2021-11-29 PROCEDURE — 84484 ASSAY OF TROPONIN QUANT: CPT | Performed by: PHYSICIAN ASSISTANT

## 2021-11-29 PROCEDURE — 70498 CT ANGIOGRAPHY NECK: CPT

## 2021-11-29 RX ORDER — MECLIZINE HYDROCHLORIDE 25 MG/1
50 TABLET ORAL ONCE
Status: COMPLETED | OUTPATIENT
Start: 2021-11-29 | End: 2021-11-29

## 2021-11-29 RX ORDER — ONDANSETRON 2 MG/ML
4 INJECTION INTRAMUSCULAR; INTRAVENOUS ONCE
Status: COMPLETED | OUTPATIENT
Start: 2021-11-29 | End: 2021-11-29

## 2021-11-29 RX ORDER — SODIUM CHLORIDE 0.9 % (FLUSH) 0.9 %
10 SYRINGE (ML) INJECTION AS NEEDED
Status: DISCONTINUED | OUTPATIENT
Start: 2021-11-29 | End: 2021-11-29 | Stop reason: HOSPADM

## 2021-11-29 RX ADMIN — ONDANSETRON 4 MG: 2 INJECTION INTRAMUSCULAR; INTRAVENOUS at 14:20

## 2021-11-29 RX ADMIN — MECLIZINE HYDROCHLORIDE 50 MG: 25 TABLET ORAL at 14:20

## 2021-11-29 RX ADMIN — IOPAMIDOL 180 ML: 755 INJECTION, SOLUTION INTRAVENOUS at 17:40

## 2021-11-29 RX ADMIN — SODIUM CHLORIDE 1000 ML: 9 INJECTION, SOLUTION INTRAVENOUS at 14:21

## 2021-11-29 NOTE — TELEPHONE ENCOUNTER
"High risk for stroke referred to ED    Reason for Disposition  • [1] Weakness (i.e., paralysis, loss of muscle strength) of the face, arm / hand, or leg / foot on one side of the body AND [2] sudden onset AND [3] brief (now gone)    Additional Information  • Negative: SEVERE difficulty breathing (e.g., struggling for each breath, speaks in single words)  • Negative: [1] Difficulty breathing or swallowing AND [2] started suddenly after medicine, an allergic food or bee sting  • Negative: Shock suspected (e.g., cold/pale/clammy skin, too weak to stand, low BP, rapid pulse)  • Negative: Difficult to awaken or acting confused (e.g., disoriented, slurred speech)  • Negative: [1] Weakness (i.e., paralysis, loss of muscle strength) of the face, arm or leg on one side of the body AND [2] sudden onset AND [3] present now  • Negative: [1] Numbness (i.e., loss of sensation) of the face, arm or leg on one side of the body AND [2] sudden onset AND [3] present now  • Negative: [1] Loss of speech or garbled speech AND [2] sudden onset AND [3] present now  • Negative: Overdose (accidental or intentional) of medications  • Negative: [1] Fainted > 15 minutes ago AND [2] still feels too weak or dizzy to stand  • Negative: Heart beating < 50 beats per minute OR > 140 beats per minute  • Negative: Sounds like a life-threatening emergency to the triager  • Negative: Chest pain  • Negative: Rectal bleeding, bloody stool, or tarry-black stool  • Negative: [1] Vomiting AND [2] contains red blood or black (\"coffee ground\") material  • Negative: Vomiting is main symptom  • Negative: Diarrhea is main symptom  • Negative: Headache is main symptom  • Negative: Patient states that they are having an anxiety or panic attack  • Negative: Dizziness from low blood sugar (i.e., < 60 mg/dl or 3.5 mmol/l)  • Negative: Dizziness is described as a spinning sensation (i.e., vertigo)  • Negative: Heat exhaustion suspected (i.e., dehydration from heat " "exposure)  • Negative: Difficulty breathing  • Negative: SEVERE dizziness (e.g., unable to stand, requires support to walk, feels like passing out now)  • Negative: Extra heart beats OR irregular heart beating  (i.e., \"palpitations\")  • Negative: [1] Drinking very little AND [2] dehydration suspected (e.g., no urine > 12 hours, very dry mouth, very lightheaded)    Answer Assessment - Initial Assessment Questions  1. DESCRIPTION: \"Describe your dizziness.\"    This time began Yesterday and also having numbness in hand fingers  2. LIGHTHEADED: \"Do you feel lightheaded?\" (e.g., somewhat faint, woozy, weak upon standing)      yes  3. VERTIGO: \"Do you feel like either you or the room is spinning or tilting?\" (i.e. vertigo)      yes  4. SEVERITY: \"How bad is it?\"  \"Do you feel like you are going to faint?\" \"Can you stand and walk?\"    - MILD: Feels slightly dizzy, but walking normally.    - MODERATE: Feels very unsteady when walking, but not falling; interferes with normal activities (e.g., school, work) .    - SEVERE: Unable to walk without falling, or requires assistance to walk without falling; feels like passing out now.       Mild dizziness  5. ONSET:  \"When did the dizziness begin?\"      yesterday  6. AGGRAVATING FACTORS: \"Does anything make it worse?\" (e.g., standing, change in head position)      Change positions makes it worse  7. HEART RATE: \"Can you tell me your heart rate?\" \"How many beats in 15 seconds?\"  (Note: not all patients can do this)        Not feeling any palpitiations  8. CAUSE: \"What do you think is causing the dizziness?\"      Has right subclavian artery thrombus  9. RECURRENT SYMPTOM: \"Have you had dizziness before?\" If Yes, ask: \"When was the last time?\" \"What happened that time?\"     Yes in October when discovered the thrombus  10. OTHER SYMPTOMS: \"Do you have any other symptoms?\" (e.g., fever, chest pain, vomiting, diarrhea, bleeding)        Neck is hurting, vomited once today, head is hurting " "work up with hit throbbing for the past 3 days  11. PREGNANCY: \"Is there any chance you are pregnant?\" \"When was your last menstrual period?\"        no    Protocols used: DIZZINESS - LIGHTHEADEDNESS-ADULT-AH      "

## 2021-12-04 LAB
BACTERIA SPEC AEROBE CULT: NORMAL
BACTERIA SPEC AEROBE CULT: NORMAL

## 2021-12-07 ENCOUNTER — HOSPITAL ENCOUNTER (OUTPATIENT)
Dept: MAMMOGRAPHY | Facility: HOSPITAL | Age: 41
Discharge: HOME | End: 2021-12-07

## 2021-12-07 ENCOUNTER — HOSPITAL ENCOUNTER (OUTPATIENT)
Dept: ULTRASOUND IMAGING | Facility: HOSPITAL | Age: 41
Discharge: HOME | End: 2021-12-07

## 2021-12-07 ENCOUNTER — TELEPHONE (OUTPATIENT)
Dept: VASCULAR SURGERY | Facility: CLINIC | Age: 41
End: 2021-12-07

## 2021-12-07 DIAGNOSIS — N63.10 MASS OF RIGHT BREAST: ICD-10-CM

## 2021-12-07 PROCEDURE — 76642 ULTRASOUND BREAST LIMITED: CPT

## 2021-12-07 PROCEDURE — 77066 DX MAMMO INCL CAD BI: CPT

## 2021-12-07 PROCEDURE — G0279 TOMOSYNTHESIS, MAMMO: HCPCS

## 2021-12-08 ENCOUNTER — TELEPHONE (OUTPATIENT)
Dept: INTERNAL MEDICINE | Facility: CLINIC | Age: 41
End: 2021-12-08

## 2021-12-08 ENCOUNTER — HOSPITAL ENCOUNTER (OUTPATIENT)
Dept: CT IMAGING | Facility: HOSPITAL | Age: 41
Discharge: HOME | End: 2021-12-08

## 2021-12-08 ENCOUNTER — OFFICE VISIT (OUTPATIENT)
Dept: VASCULAR SURGERY | Facility: CLINIC | Age: 41
End: 2021-12-08

## 2021-12-08 ENCOUNTER — LAB (OUTPATIENT)
Dept: LAB | Facility: HOSPITAL | Age: 41
End: 2021-12-08

## 2021-12-08 ENCOUNTER — OFFICE VISIT (OUTPATIENT)
Dept: INTERNAL MEDICINE | Facility: CLINIC | Age: 41
End: 2021-12-08

## 2021-12-08 VITALS
HEART RATE: 86 BPM | RESPIRATION RATE: 18 BRPM | SYSTOLIC BLOOD PRESSURE: 110 MMHG | OXYGEN SATURATION: 99 % | TEMPERATURE: 97.5 F | DIASTOLIC BLOOD PRESSURE: 74 MMHG | HEIGHT: 69 IN | WEIGHT: 214.6 LBS | BODY MASS INDEX: 31.78 KG/M2

## 2021-12-08 VITALS
DIASTOLIC BLOOD PRESSURE: 66 MMHG | SYSTOLIC BLOOD PRESSURE: 122 MMHG | OXYGEN SATURATION: 98 % | HEIGHT: 69 IN | HEART RATE: 87 BPM | WEIGHT: 214 LBS | BODY MASS INDEX: 31.7 KG/M2

## 2021-12-08 DIAGNOSIS — R91.8 LUNG NODULE, MULTIPLE: ICD-10-CM

## 2021-12-08 DIAGNOSIS — I74.8 SUBCLAVIAN ARTERY THROMBOSIS (HCC): Primary | ICD-10-CM

## 2021-12-08 DIAGNOSIS — D50.0 IRON DEFICIENCY ANEMIA DUE TO CHRONIC BLOOD LOSS: ICD-10-CM

## 2021-12-08 DIAGNOSIS — Z72.0 TOBACCO ABUSE: ICD-10-CM

## 2021-12-08 DIAGNOSIS — I77.1 STENOSIS OF LEFT SUBCLAVIAN ARTERY (HCC): ICD-10-CM

## 2021-12-08 DIAGNOSIS — E66.9 OBESITY (BMI 30.0-34.9): ICD-10-CM

## 2021-12-08 DIAGNOSIS — E78.2 MIXED HYPERLIPIDEMIA: ICD-10-CM

## 2021-12-08 DIAGNOSIS — N92.0 MENORRHAGIA WITH REGULAR CYCLE: Primary | ICD-10-CM

## 2021-12-08 LAB
BASOPHILS # BLD AUTO: 0.02 10*3/MM3 (ref 0–0.2)
BASOPHILS NFR BLD AUTO: 0.3 % (ref 0–1.5)
DEPRECATED RDW RBC AUTO: 47.8 FL (ref 37–54)
EOSINOPHIL # BLD AUTO: 0.36 10*3/MM3 (ref 0–0.4)
EOSINOPHIL NFR BLD AUTO: 5.9 % (ref 0.3–6.2)
ERYTHROCYTE [DISTWIDTH] IN BLOOD BY AUTOMATED COUNT: 18.1 % (ref 12.3–15.4)
HCT VFR BLD AUTO: 27.3 % (ref 34–46.6)
HGB BLD-MCNC: 7.6 G/DL (ref 12–15.9)
IMM GRANULOCYTES # BLD AUTO: 0.02 10*3/MM3 (ref 0–0.05)
IMM GRANULOCYTES NFR BLD AUTO: 0.3 % (ref 0–0.5)
LYMPHOCYTES # BLD AUTO: 1.53 10*3/MM3 (ref 0.7–3.1)
LYMPHOCYTES NFR BLD AUTO: 25.1 % (ref 19.6–45.3)
MCH RBC QN AUTO: 20.9 PG (ref 26.6–33)
MCHC RBC AUTO-ENTMCNC: 27.8 G/DL (ref 31.5–35.7)
MCV RBC AUTO: 75 FL (ref 79–97)
MONOCYTES # BLD AUTO: 0.67 10*3/MM3 (ref 0.1–0.9)
MONOCYTES NFR BLD AUTO: 11 % (ref 5–12)
NEUTROPHILS NFR BLD AUTO: 3.5 10*3/MM3 (ref 1.7–7)
NEUTROPHILS NFR BLD AUTO: 57.4 % (ref 42.7–76)
NRBC BLD AUTO-RTO: 0 /100 WBC (ref 0–0.2)
PLATELET # BLD AUTO: 264 10*3/MM3 (ref 140–450)
PMV BLD AUTO: 11.6 FL (ref 6–12)
RBC # BLD AUTO: 3.64 10*6/MM3 (ref 3.77–5.28)
VIT B12 BLD-MCNC: 384 PG/ML (ref 211–946)
WBC NRBC COR # BLD: 6.1 10*3/MM3 (ref 3.4–10.8)

## 2021-12-08 PROCEDURE — 36415 COLL VENOUS BLD VENIPUNCTURE: CPT

## 2021-12-08 PROCEDURE — 99214 OFFICE O/P EST MOD 30 MIN: CPT | Performed by: FAMILY MEDICINE

## 2021-12-08 PROCEDURE — 99214 OFFICE O/P EST MOD 30 MIN: CPT | Performed by: SURGERY

## 2021-12-08 PROCEDURE — 85025 COMPLETE CBC W/AUTO DIFF WBC: CPT | Performed by: FAMILY MEDICINE

## 2021-12-08 PROCEDURE — 82607 VITAMIN B-12: CPT

## 2021-12-08 NOTE — PROGRESS NOTES
Subjective     Chief Complaint   Patient presents with   • Dizziness     Hospital follow up. Follow up.        History of Present Illness  Patient presents for ER follow up.  She had been dizzy.  She had also had some chest discomfort and arm numbness.  The work up there showed no new issues and she was encouraged to follow up with her PCP and subspecialists.  She is fatigued.  She had issues with anemia for some time   She is having much heavier periods.   She has had CTs of her chest recently which show nodules that appear to be getting bigger.   She is a smoker.  She currently smokes one ppd.  She has smoked since she was 15.  She is very concerned about these and would like to see someone about finding out what they are.     Patient's PMR from outside medical facility reviewed and noted.    Review of Systems     Otherwise complete ROS reviewed and negative except as mentioned in the HPI.    Past Medical History:   Past Medical History:   Diagnosis Date   • Asthma      Past Surgical History:  Past Surgical History:   Procedure Laterality Date   •  SECTION     • TONSILLECTOMY     • TUBAL ABDOMINAL LIGATION       Social History:  reports that she has been smoking cigarettes. She has been smoking about 0.50 packs per day. She has never used smokeless tobacco. She reports previous alcohol use. She reports previous drug use.    Family History: family history includes Anxiety disorder in her mother; Breast cancer in her maternal grandmother; Depression in her mother; Heart disease in her father.       Allergies:  Allergies   Allergen Reactions   • Penicillins Hives     Medications:  Prior to Admission medications    Medication Sig Start Date End Date Taking? Authorizing Provider   albuterol sulfate  (90 Base) MCG/ACT inhaler Inhale 2 puffs Every 4 (Four) Hours As Needed for Wheezing or Shortness of Air. 21  Yes Jagjit Foster PA-C   aspirin 81 MG EC tablet Take 1 tablet by mouth Daily.  "10/27/21  Yes Den Agarwal MD   cyanocobalamin 1000 MCG/ML injection Inject 1 mL into the appropriate muscle as directed by prescriber Every 28 (Twenty-Eight) Days. 11/17/21  Yes Elena Taylor DO   cyclobenzaprine (FLEXERIL) 10 MG tablet Take 1 tablet by mouth 2 (Two) Times a Day As Needed for Muscle Spasms. 11/17/21  Yes Elena Taylor DO   escitalopram (LEXAPRO) 10 MG tablet Take 10 mg by mouth Daily.   Yes Provider, MD Luis   Iron, Ferrous Gluconate, 256 (28 Fe) MG tablet Take 1 tablet by mouth Daily. 11/29/21  Yes Jagjit Foster PA-C   levocetirizine (Xyzal) 5 MG tablet Take 1 tablet by mouth Every Evening. 11/17/21  Yes Elena Taylor DO   nicotine (NICODERM CQ) 14 MG/24HR patch Place 1 patch on the skin as directed by provider Daily. 10/27/21  Yes Den Agarwal MD   nicotine polacrilex (HM Nicotine Polacrilex) 2 MG lozenge Dissolve 1 lozenge in the mouth As Needed for Smoking Cessation. 11/17/21  Yes Elena Taylor DO   rosuvastatin (Crestor) 5 MG tablet Take 1 tablet by mouth Daily. 10/26/21  Yes Den Agarwal MD   Syringe 20G X 1-1/2\" 3 ML misc 1 each Every 30 (Thirty) Days. 11/17/21  Yes Elena Taylor DO   traZODone (DESYREL) 50 MG tablet Take 1 tablet by mouth Every Night. 11/17/21  Yes Elena Taylor DO   apixaban (ELIQUIS) 5 MG tablet tablet Take 2 tablets by mouth Every 12 (Twelve) Hours for 6 days, THEN 1 tablet Every 12 (Twelve) Hours Indications: DVT/PE (active thrombosis) 11/1/21 12/7/21  Den Agarwal MD       Objective     Vital Signs: /74 (BP Location: Left arm, Patient Position: Sitting, Cuff Size: Adult)   Pulse 86   Temp 97.5 °F (36.4 °C) (Skin)   Resp 18   Ht 175.3 cm (69\")   Wt 97.3 kg (214 lb 9.6 oz)   SpO2 99%   BMI 31.69 kg/m²   Physical Exam  Vitals and nursing note reviewed.   Constitutional:       General: She is not in acute distress.     Appearance: Normal appearance. She is well-developed. She is obese. She is not " ill-appearing.   HENT:      Head: Normocephalic and atraumatic.      Right Ear: Tympanic membrane, ear canal and external ear normal.      Left Ear: Tympanic membrane, ear canal and external ear normal.      Nose: Nose normal.      Mouth/Throat:      Mouth: Mucous membranes are moist.      Pharynx: Oropharynx is clear.   Eyes:      General: No scleral icterus.        Right eye: No discharge.         Left eye: No discharge.      Extraocular Movements: Extraocular movements intact.      Conjunctiva/sclera: Conjunctivae normal.      Pupils: Pupils are equal, round, and reactive to light.   Neck:      Thyroid: No thyromegaly.      Vascular: No JVD.      Trachea: No tracheal deviation.   Cardiovascular:      Rate and Rhythm: Normal rate and regular rhythm.      Pulses: Normal pulses.      Heart sounds: Normal heart sounds. No murmur heard.  No friction rub. No gallop.    Pulmonary:      Effort: Pulmonary effort is normal.      Breath sounds: Normal breath sounds.   Abdominal:      General: Bowel sounds are normal. There is no distension.      Palpations: Abdomen is soft.      Tenderness: There is no abdominal tenderness.   Musculoskeletal:         General: Normal range of motion.      Cervical back: Normal range of motion and neck supple.   Lymphadenopathy:      Cervical: No cervical adenopathy.   Skin:     General: Skin is warm and dry.      Capillary Refill: Capillary refill takes less than 2 seconds.   Neurological:      Mental Status: She is alert and oriented to person, place, and time.      Cranial Nerves: No cranial nerve deficit.      Coordination: Coordination normal.   Psychiatric:         Mood and Affect: Mood normal.         Behavior: Behavior normal.         Patient's Body mass index is 31.69 kg/m². indicating that she is obese (BMI >30). Obesity-related health conditions include the following: none. Obesity is unchanged. BMI is is above average; BMI management plan is completed. We discussed portion control  and increasing exercise..      Results Reviewed:  Glucose   Date Value Ref Range Status   11/29/2021 96 65 - 99 mg/dL Final     BUN   Date Value Ref Range Status   11/29/2021 10 6 - 20 mg/dL Final     Creatinine   Date Value Ref Range Status   11/29/2021 0.70 0.57 - 1.00 mg/dL Final     Sodium   Date Value Ref Range Status   11/29/2021 139 136 - 145 mmol/L Final     Potassium   Date Value Ref Range Status   11/29/2021 4.3 3.5 - 5.2 mmol/L Final     Chloride   Date Value Ref Range Status   11/29/2021 107 98 - 107 mmol/L Final     CO2   Date Value Ref Range Status   11/29/2021 25.0 22.0 - 29.0 mmol/L Final     Calcium   Date Value Ref Range Status   11/29/2021 8.7 8.6 - 10.5 mg/dL Final     ALT (SGPT)   Date Value Ref Range Status   11/29/2021 5 1 - 33 U/L Final     AST (SGOT)   Date Value Ref Range Status   11/29/2021 11 1 - 32 U/L Final     WBC   Date Value Ref Range Status   12/08/2021 6.10 3.40 - 10.80 10*3/mm3 Final     Hematocrit   Date Value Ref Range Status   12/08/2021 27.3 (L) 34.0 - 46.6 % Final     Platelets   Date Value Ref Range Status   12/08/2021 264 140 - 450 10*3/mm3 Final     Total Cholesterol   Date Value Ref Range Status   10/26/2021 174 0 - 200 mg/dL Final     Triglycerides   Date Value Ref Range Status   10/26/2021 139 0 - 150 mg/dL Final     HDL Cholesterol   Date Value Ref Range Status   10/26/2021 40 40 - 60 mg/dL Final     LDL Cholesterol    Date Value Ref Range Status   10/26/2021 109 (H) 0 - 100 mg/dL Final     LDL/HDL Ratio   Date Value Ref Range Status   10/26/2021 2.66  Final     Hemoglobin A1C   Date Value Ref Range Status   10/26/2021 5.50 4.80 - 5.60 % Final         Assessment / Plan     Assessment/Plan:  1. Menorrhagia with regular cycle    - Ambulatory Referral to Obstetrics / Gynecology    2. Iron deficiency anemia due to chronic blood loss    - Ambulatory Referral to Obstetrics / Gynecology  - CBC w AUTO Differential; Future  - Vitamin B12; Future    3. Lung nodule, multiple    -  Ambulatory Referral to Cardiothoracic Surgery    4. Tobacco abuse  Smoking cessation was discussed at length.  Tips and techniques to assist in becoming tobacco free discussed.   She verbalized understanding.           Return in about 3 months (around 3/8/2022). unless patient needs to be seen sooner or acute issues arise.        I have discussed the patient results/orders and and plan/recommendation with them at today's visit.      Elena Taylor,    12/08/2021

## 2021-12-08 NOTE — TELEPHONE ENCOUNTER
----- Message from Elena Taylor DO sent at 12/7/2021  1:51 PM CST -----  Benign mammogram/ultrasound  Repeat one year

## 2021-12-08 NOTE — PROGRESS NOTES
12/08/2021      Elena Taylor DO  [address] [place] [address]        Mark Ziegler Flaquito  1980    Chief Complaint   Patient presents with   • Follow-up     1 month HFU of left subclavian thrombus.  Pt had a CT of the chest on 11/29/21 when she returned to the ER for the same symptoms that brought her to the ER on 10/23/21.  Pt has been taking Eliquis since being discharged.  Pt denies any stroke like symptoms.        Dear Elena Taylor DO:    HPI     I had the pleasure of seeing your patient in the office today for follow up.  As you recall, the patient is a 41 y.o. female who we are currently following for recent hospital visit for some ischemic type changes to fingers of the bilateral hands, as well as lightheadedness and headache.  She had been admitted under the medical service part of work-up that included CTA of the neck and chest.  She had been found to have what appeared to be some thrombus in the aortic arch, as well as at the origin of the innominate and the proximal left subclavian artery.  Echocardiogram had shown no significant cardiac abnormality.  Source of this thrombus was not clear but she had no other acute findings.  She initially was started on anticoagulation and transition to oral Eliquis at discharge.  She was also noted to be somewhat anemic with hemoglobin of around 9.  More recently at the end of November about 1 week ago she was here in the ER with some feelings of lightheadedness and had noted that since starting the Eliquis her menstrual cycles have become heavier.  Here in the ER again CT of the chest and neck was done which I was able to review.  Previously noted areas of thrombus in the aortic arch, proximal subclavian, and innominate have actually resolved and there was no evidence of any abnormality of the aorta on the visualized segments.  She was noted to have somewhat decreased hemoglobin around 7.7.  Repeat was actually done today and found it to be 7.6.   "She currently denies any upper or lower extremity pain and has no other signs of ischemia.  She continues on her Eliquis as started in the hospital and is also on 81 mg aspirin daily and a statin.  She has no other acute complaints today.      Review of Systems   Constitutional: Negative.  Negative for activity change, appetite change, chills, diaphoresis, fatigue and fever.   HENT: Negative.  Negative for congestion, sneezing, sore throat and trouble swallowing.    Eyes: Negative.  Negative for visual disturbance.   Respiratory: Negative.  Negative for chest tightness and shortness of breath.    Cardiovascular: Negative.  Negative for chest pain, palpitations and leg swelling.   Gastrointestinal: Negative.  Negative for abdominal distention, abdominal pain, nausea and vomiting.   Endocrine: Negative.    Genitourinary: Negative.    Musculoskeletal: Negative.    Skin: Negative.    Allergic/Immunologic: Negative.    Neurological: Negative.    Hematological: Negative.    Psychiatric/Behavioral: Negative.        /66   Pulse 87   Ht 175.3 cm (69\")   Wt 97.1 kg (214 lb)   SpO2 98%   BMI 31.60 kg/m²   Physical Exam  Constitutional:       Appearance: She is well-developed.   HENT:      Head: Normocephalic and atraumatic.      Nose: Nose normal.      Mouth/Throat:      Mouth: Mucous membranes are moist.   Eyes:      Pupils: Pupils are equal, round, and reactive to light.   Cardiovascular:      Rate and Rhythm: Normal rate and regular rhythm.      Pulses:           Carotid pulses are 2+ on the right side and 2+ on the left side.       Radial pulses are 2+ on the right side and 2+ on the left side.        Femoral pulses are 2+ on the right side and 2+ on the left side.       Popliteal pulses are 2+ on the right side and 2+ on the left side.        Dorsalis pedis pulses are 2+ on the right side and 2+ on the left side.        Posterior tibial pulses are 2+ on the right side and 2+ on the left side.      Comments: " Palpable bilateral radial and ulnar pulses.  Also bilateral palpable DP and PT pulses.  Normal capillary refill to the bilateral hands and feet.  No signs of ischemia.  Pulmonary:      Effort: Pulmonary effort is normal.   Abdominal:      General: There is no distension.      Palpations: Abdomen is soft. There is no mass.      Tenderness: There is no abdominal tenderness.   Musculoskeletal:         General: Normal range of motion.      Cervical back: Normal range of motion and neck supple.   Skin:     General: Skin is warm and dry.      Capillary Refill: Capillary refill takes less than 2 seconds.   Neurological:      Mental Status: She is alert and oriented to person, place, and time.   Psychiatric:         Behavior: Behavior normal.         Thought Content: Thought content normal.         Judgment: Judgment normal.         DIAGNOSTIC DATA:    CT Head Without Contrast    Result Date: 11/29/2021  Narrative: EXAM: CT HEAD WO CONTRAST-  INDICATION: dizziness  COMPARISON: None available.  DOSE LENGTH PRODUCT: 690 mGy cm. Automated exposure control was also utilized to decrease patient radiation dose.  FINDINGS:  No acute intracranial hemorrhage. Gray-white differentiation is maintained. No midline shift or mass effect. Lateral ventricles are nondilated. Basilar cisterns are patent. No acute orbital finding. Mastoid air cells are clear. Visualized portion of the paranasal sinuses are clear. No acute osseous finding.      Impression:  No acute intracranial findings. This report was finalized on 11/29/2021 15:46 by Dr. Raffy Avalos MD.    CT Angiogram Neck    Result Date: 11/29/2021  Narrative: EXAMINATION:  CT ANGIOGRAM NECK-  11/29/2021 5:01 PM CST  HISTORY: Left-sided headache. Dizziness. NV, hx subclavian clot, on eliquis. Left hand numbness.  COMPARISON : No comparison study.  DLP: 431 mGy-cm. Automated dosage control was utilized.  TECHNIQUE: CT angio was performed of the neck with contrast. Coronal, sagittal and  3-D reconstruction were performed.  FINDINGS: The vertebral arteries in the neck are patent bilaterally with no significant plaque or stenosis. The left and right carotid systems are patent in the neck with no significant plaque or stenosis.  The parotid, submandibular and thyroid glands demonstrate no focal abnormality. There are small level 2, level 3 and level 5 lymph nodes that are not pathologically enlarged. No mass lesions are appreciated in the neck. There is a 5 mm subpleural right upper lobe nodule anteriorly image 179 series 5. There is centrilobular and paraseptal emphysema. There is a small retention cyst versus mucus in the right maxillary sinus.      Impression: 1. NASCET criteria utilized. 2. There is no vertebral artery plaque or stenosis. There is no dissection. 3. There is no carotid artery plaque or stenosis. There is no dissection. 4. There is a 5 mm subpleural right upper lobe nodule that will need to be followed. Fleischner Society guidelines recommends follow-up CT in 12 months for nodules measuring less than 6 mm. 5. Paraseptal and centrilobular emphysema.  The full report of this exam was immediately signed and available to the emergency room. The patient is currently in the emergency room. This report was finalized on 11/29/2021 17:56 by Dr. Teo Morgan MD.    CT Cervical Spine Without Contrast    Result Date: 11/29/2021  Narrative: EXAM: CT CERVICAL SPINE WO CONTRAST-  INDICATION: pain, worse on left  COMPARISON: 10/23/2021  DOSE LENGTH PRODUCT: 302 mGy cm. Automated exposure control was also utilized to decrease patient radiation dose.  FINDINGS:  Craniocervical relationships are maintained. The odontoid process is intact. Straightening of normal cervical lordosis, unchanged. Cervical vertebral body heights are maintained. No acute fracture or subluxation. Mild multilevel cervical spine degenerative change. Pronounced LEFT C3-C4 facet arthropathy. Paravertebral soft tissues appear  unremarkable. Paraseptal emphysema is noted in the lung apices.  C2-C3: Central canal and neural foramina are widely patent. C3-C4: Central canal and neural foramina are widely patent. C4-C5: Central canal and neural foramina are widely patent. C5-C6: Central canal and neural foramina are widely patent. C6-C7: Central canal and neural foramina are widely patent.      Impression:  1.  No acute fracture or malalignment. 2.  Advanced degenerative change in the LEFT C3-C4 facet joint. 3.  No central canal or neural foraminal stenosis. This report was finalized on 11/29/2021 15:55 by Dr. Raffy Avalos MD.    XR Chest 1 View    Result Date: 11/29/2021  Narrative: EXAM: XR CHEST 1 VW- 11/29/2021 2:11 PM CST  HISTORY: cp   COMPARISON: February 9, 2021.  TECHNIQUE: Frontal radiograph of the chest  FINDINGS: The lungs are clear. The cardiomediastinal silhouette and pulmonary vascularity are within normal limits.  The osseous structures and surrounding soft tissues demonstrate no acute abnormality.       Impression: 1. No radiographic evidence of acute cardiopulmonary process.   This report was finalized on 11/29/2021 14:14 by Dr. Lexa Moura MD.    CT Angiogram Chest    Result Date: 11/29/2021  Narrative: EXAMINATION:  CT ANGIOGRAM CHEST-  11/29/2021 5:01 PM CST  HISTORY: Left-sided chest pain. Dizziness. NV, hx subclavian clot, on eliquis.  COMPARISON : 10/23/2021.  DLP: 413 mGy-cm. Automated dosage control was utilized.  TECHNIQUE: CT angio was performed of the chest with contrast. Coronal, sagittal and 3-D reconstruction were performed.  MEDIASTINUM, HEART AND VASCULAR STRUCTURES: The thoracic aorta and pulmonary arteries are normal caliber. There is no CT evidence of pulmonary embolus. There are small hilar and mediastinal lymph nodes. Heart size is mildly prominent.  LUNGS: There is a 4 mm subpleural left upper lobe nodule posteriorly on image 70 series 6. There is a 5 mm subpleural right upper lobe nodule anteriorly  on image 88 series 6. This nodule has slightly irregular margins. There is mild dependent atelectasis. There is centrilobular and paraseptal emphysema.  UPPER ABDOMEN: No acute findings are identified in the upper abdomen.  BONES: There are mild degenerative changes of the spine. There are a couple of sclerotic bone lesions are likely small bone islands in the mid thoracic spine.      Impression: 1. No CT evidence of pulmonary embolus. 2. Heart size is mildly prominent. 3. Subpleural upper lobe pulmonary nodules bilaterally measuring up to 5 mm. Fleischner Society guidelines recommends follow-up CT in 12 months. 4. Centrilobular and paraseptal emphysema.  The full report of this exam was immediately signed and available to the emergency room. The patient is currently in the emergency room.  This report was finalized on 11/29/2021 18:06 by Dr. Teo Morgan MD.    CT Angiogram Head    Result Date: 11/29/2021  Narrative: EXAMINATION:  CT ANGIOGRAM HEAD-  11/29/2021 5:01 PM CST  HISTORY: LEFT sided headache. Dizziness. Nausea and vomiting. HISTORY of subclavian clot, on eliquis.  COMPARISON : No comparison study.  DLP: 431 mGy-cm. Automated dosage control was utilized.  TECHNIQUE: CT angio was performed of the brain with contrast. Coronal, sagittal and 3-D images were reconstructed.  FINDINGS: The vertebral and basilar arteries are patent. The internal carotid arteries are patent. The anterior, middle and posterior cerebral arteries are patent. No aneurysms are appreciated. The visualized venous sinuses are patent.      Impression: CT angiogram of the brain is within normal limits demonstrating no major branch occlusion or aneurysm.  The full report of this exam was immediately signed and available to the emergency room. The patient is currently in the emergency room. This report was finalized on 11/29/2021 17:50 by Dr. Teo Morgan MD.    Mammo Diagnostic Digital Tomosynthesis Bilateral With CAD, US Breast Right  Limited    Result Date: 12/7/2021  Narrative: EXAMINATION: MAMMO DIAGNOSTIC DIGITAL TOMOSYNTHESIS BILATERAL W CAD-, US BREAST RIGHT LIMITED- 12/7/2021 8:37 AM CST  HISTORY: Area of palpable concern in the right breast. Annual bilateral exam.  FINDINGS: Bilateral digital CC and MLO projection views in addition to a right full lateral view with 3-D Tomosynthesis were obtained. A CC spot compression view was also obtained of the right breast. No prior studies for comparison. Baseline exam. Additionally, computer-aided detection technology was utilized for analysis of this exam.  The breasts are heterogeneously dense, which may obscure small masses. A well-circumscribed masses in the slightly lower inner right breast which measures 1.9 cm in size. A second well-circumscribed masses seen in the lower outer right breast which measures approximately 1.5 cm in size. Multiple smaller well-circumscribed masses are seen bilaterally, compatible with benign etiology. No architectural distortion or suspicious appearing microcalcifications are seen within either breast.  Targeted right breast ultrasound was performed at the area of palpable concern. At the 4:00 position, 3 cm from the nipple, there is a well-circumscribed, anechoic, oval mass measuring 1.8 x 1.1 x 0.9 cm in size. There is increased through transmission and no internal color flow. This is compatible with a simple cyst and correlates with the mammographic finding.  At the 9:00 position of the right breast, 2 cm from the nipple, a second well-circumscribed, oval, anechoic mass is seen which measures 1.3 x 1 x 1.3 cm in size. There is increased through transmission and no internal color flow. Adjacent smaller cysts are also evident. Findings correlate with the mammographic findings.        Impression: 1.  No mammographic or targeted sonographic evidence of right breast malignancy. No mammographic evidence of left breast malignancy. Annual screening mammogram is  recommended. 2.  BI-RADS Category 2-Benign. 3.  The patient will receive a letter notifying her of the results of this exam. 4.  Breast Density Category C  This report was finalized on 12/07/2021 09:15 by Dr. Andrew Osorio MD.      Patient Active Problem List   Diagnosis   • Peripheral vascular disease (HCC)         ICD-10-CM ICD-9-CM   1. Subclavian artery thrombosis (HCC)  I74.8 444.89   2. Mixed hyperlipidemia  E78.2 272.2   3. Obesity (BMI 30.0-34.9)  E66.9 278.00       Lab Frequency Next Occurrence   CT angiogram chest w contrast Once 06/06/2022       PLAN: After thoroughly evaluating Mark Huddleston, I believe the best course of action is to remain conservative from a vascular standpoint.  She presented to the hospital a little over a month ago with some mild ischemic type changes to the tips of some of her fingers and ultimately work-up had shown some thrombus within the aorta and the proximal left subclavian and proximal innominate arteries of unknown significance or origin.  She was started on anticoagulation and monitored.  Plan was for repeat CTA 1 month after that however she was in the ER about a week ago and so had repeat scans done at that time which included a CTA of the neck and chest.  I evaluated both of these studies personally and the previous thrombus noted in the aortic arch and in the subclavian and innominate has resolved and there was no evidence of any thrombus in any of the great vessels.  She does continue on Eliquis as previously started but blood work has shown some anemia with hemoglobin around 7.6 which was done on lab work which had been repeated today by her primary care doctor.  She notes she has had heavier menstrual cycles since being on anticoagulation.  At this point if she can tolerate I would recommend that she stay on a full 6-month course of anticoagulation as we are unclear as to the source of her aortic thrombus although at this point it has resolved.  She otherwise  has no findings of any ischemic changes and no other acute vascular issues.  My plan will be to see her back in the office in 6 months and we will repeat a CTA of the chest at that time to reevaluate the aortic arch and the proximal great vessels to ensure there is no further abnormal findings or any thrombus and I think at that time if it is a clear CAT scan and we can likely stop her anticoagulation and monitor her moving forward.  The patient is to continue taking their medications as previously discussed.   I did discuss vascular risk factors as they pertain to the progression of vascular disease including controlling hyperlipidemia. These factors remain stable. Patient's Body mass index is 31.6 kg/m². indicating that she is obese (BMI >30). Obesity-related health conditions include the following: dyslipidemias. Obesity is unchanged. BMI is is above average; BMI management plan is completed. We discussed portion control and increasing exercise. I did  extensively on smoking cessation, and the patient was advised of the continued risks of smoking.  I provided over 10 minutes counseling on this matter. This was all discussed in full with complete understanding.  Thank you for allowing me to participate in the care of your patient.  Please do not hesitate to call with any questions or concerns.  We will keep you aware of any further encounters with Mark Huddleston.      Sincerely Yours,      Eulogio Mares MD

## 2021-12-08 NOTE — PATIENT INSTRUCTIONS
"BMI for Adults  What is BMI?  Body mass index (BMI) is a number that is calculated from a person's weight and height. BMI can help estimate how much of a person's weight is composed of fat. BMI does not measure body fat directly. Rather, it is an alternative to procedures that directly measure body fat, which can be difficult and expensive.  BMI can help identify people who may be at higher risk for certain medical problems.  What are BMI measurements used for?  BMI is used as a screening tool to identify possible weight problems. It helps determine whether a person is obese, overweight, a healthy weight, or underweight.  BMI is useful for:  · Identifying a weight problem that may be related to a medical condition or may increase the risk for medical problems.  · Promoting changes, such as changes in diet and exercise, to help reach a healthy weight. BMI screening can be repeated to see if these changes are working.  How is BMI calculated?  BMI involves measuring your weight in relation to your height. Both height and weight are measured, and the BMI is calculated from those numbers. This can be done either in English (U[place] or metric measurements. Note that charts and online BMI calculators are available to help you find your BMI quickly and easily without having to do these calculations yourself.  To calculate your BMI in English (U[place] measurements:    1. Measure your weight in pounds (lb).  2. Multiply the number of pounds by 703.  ? For example, for a person who weighs 180 lb, multiply that number by 703, which equals 126,540.  3. Measure your height in inches. Then multiply that number by itself to get a measurement called \"inches squared.\"  ? For example, for a person who is 70 inches tall, the \"inches squared\" measurement is 70 inches x 70 inches, which equals 4,900 inches squared.  4. Divide the total from step 2 (number of lb x 703) by the total from step 3 (inches squared): 126,540 ÷ 4,900 = 25.8. This is " "your BMI.    To calculate your BMI in metric measurements:  1. Measure your weight in kilograms (kg).  2. Measure your height in meters (m). Then multiply that number by itself to get a measurement called \"meters squared.\"  ? For example, for a person who is 1.75 m tall, the \"meters squared\" measurement is 1.75 m x 1.75 m, which is equal to 3.1 meters squared.  3. Divide the number of kilograms (your weight) by the meters squared number. In this example: 70 ÷ 3.1 = 22.6. This is your BMI.  What do the results mean?  BMI charts are used to identify whether you are underweight, normal weight, overweight, or obese. The following guidelines will be used:  · Underweight: BMI less than 18.5.  · Normal weight: BMI between 18.5 and 24.9.  · Overweight: BMI between 25 and 29.9.  · Obese: BMI of 30 or above.  Keep these notes in mind:  · Weight includes both fat and muscle, so someone with a muscular build, such as an athlete, may have a BMI that is higher than 24.9. In cases like these, BMI is not an accurate measure of body fat.  · To determine if excess body fat is the cause of a BMI of 25 or higher, further assessments may need to be done by a health care provider.  · BMI is usually interpreted in the same way for men and women.  Where to find more information  For more information about BMI, including tools to quickly calculate your BMI, go to these websites:  · Centers for Disease Control and Prevention: www.cdc.gov  · American Heart Association: www.heart.org  · National Heart, Lung, and Blood Epes: www.nhlbi.nih.gov  Summary  · Body mass index (BMI) is a number that is calculated from a person's weight and height.  · BMI may help estimate how much of a person's weight is composed of fat. BMI can help identify those who may be at higher risk for certain medical problems.  · BMI can be measured using English measurements or metric measurements.  · BMI charts are used to identify whether you are underweight, normal " weight, overweight, or obese.  This information is not intended to replace advice given to you by your health care provider. Make sure you discuss any questions you have with your health care provider.  Document Revised: 09/09/2020 Document Reviewed: 07/17/2020  Elsevier Patient Education © 2021 [employer]      For more information:    Quit Now [place]  1-800-QUIT-NOW  https://[place].quitlogix.org/en-[place]/  Steps to Quit Smoking  Smoking tobacco can be harmful to your health and can affect almost every organ in your body. Smoking puts you, and those around you, at risk for developing many serious chronic diseases. Quitting smoking is difficult, but it is one of the best things that you can do for your health. It is never too late to quit.  What are the benefits of quitting smoking?  When you quit smoking, you lower your risk of developing serious diseases and conditions, such as:  · Lung cancer or lung disease, such as COPD.  · Heart disease.  · Stroke.  · Heart attack.  · Infertility.  · Osteoporosis and bone fractures.  Additionally, symptoms such as coughing, wheezing, and shortness of breath may get better when you quit. You may also find that you get sick less often because your body is stronger at fighting off colds and infections. If you are pregnant, quitting smoking can help to reduce your chances of having a baby of low birth weight.  How do I get ready to quit?  When you decide to quit smoking, create a plan to make sure that you are successful. Before you quit:  · Pick a date to quit. Set a date within the next two weeks to give you time to prepare.  · Write down the reasons why you are quitting. Keep this list in places where you will see it often, such as on your bathroom mirror or in your car or wallet.  · Identify the people, places, things, and activities that make you want to smoke (triggers) and avoid them. Make sure to take these actions:  ¨ Throw away all cigarettes at home, at work, and in  your car.  ¨ Throw away smoking accessories, such as ashtrays and lighters.  ¨ Clean your car and make sure to empty the ashtray.  ¨ Clean your home, including curtains and carpets.  · Tell your family, friends, and coworkers that you are quitting. Support from your loved ones can make quitting easier.  · Talk with your health care provider about your options for quitting smoking.  · Find out what treatment options are covered by your health insurance.  What strategies can I use to quit smoking?  Talk with your healthcare provider about different strategies to quit smoking. Some strategies include:  · Quitting smoking altogether instead of gradually lessening how much you smoke over a period of time. Research shows that quitting “cold turkey” is more successful than gradually quitting.  · Attending in-person counseling to help you build problem-solving skills. You are more likely to have success in quitting if you attend several counseling sessions. Even short sessions of 10 minutes can be effective.  · Finding resources and support systems that can help you to quit smoking and remain smoke-free after you quit. These resources are most helpful when you use them often. They can include:  ¨ Online chats with a counselor.  ¨ Telephone quitlines.  ¨ Printed self-help materials.  ¨ Support groups or group counseling.  ¨ Text messaging programs.  ¨ Mobile phone applications.  · Taking medicines to help you quit smoking. (If you are pregnant or breastfeeding, talk with your health care provider first.) Some medicines contain nicotine and some do not. Both types of medicines help with cravings, but the medicines that include nicotine help to relieve withdrawal symptoms. Your health care provider may recommend:  ¨ Nicotine patches, gum, or lozenges.  ¨ Nicotine inhalers or sprays.  ¨ Non-nicotine medicine that is taken by mouth.  Talk with your health care provider about combining strategies, such as taking medicines while  you are also receiving in-person counseling. Using these two strategies together makes you more likely to succeed in quitting than if you used either strategy on its own.  If you are pregnant or breastfeeding, talk with your health care provider about finding counseling or other support strategies to quit smoking. Do not take medicine to help you quit smoking unless told to do so by your health care provider.  What things can I do to make it easier to quit?  Quitting smoking might feel overwhelming at first, but there is a lot that you can do to make it easier. Take these important actions:  · Reach out to your family and friends and ask that they support and encourage you during this time. Call telephone quitlines, reach out to support groups, or work with a counselor for support.  · Ask people who smoke to avoid smoking around you.  · Avoid places that trigger you to smoke, such as bars, parties, or smoke-break areas at work.  · Spend time around people who do not smoke.  · Lessen stress in your life, because stress can be a smoking trigger for some people. To lessen stress, try:  ¨ Exercising regularly.  ¨ Deep-breathing exercises.  ¨ Yoga.  ¨ Meditating.  ¨ Performing a body scan. This involves closing your eyes, scanning your body from head to toe, and noticing which parts of your body are particularly tense. Purposefully relax the muscles in those areas.  · Download or purchase mobile phone or tablet apps (applications) that can help you stick to your quit plan by providing reminders, tips, and encouragement. There are many free apps, such as QuitGuide from the CDC (Centers for Disease Control and Prevention). You can find other support for quitting smoking (smoking cessation) through smokefree.gov and other websites.  How will I feel when I quit smoking?  Within the first 24 hours of quitting smoking, you may start to feel some withdrawal symptoms. These symptoms are usually most noticeable 2-3 days after  quitting, but they usually do not last beyond 2-3 weeks. Changes or symptoms that you might experience include:  · Mood swings.  · Restlessness, anxiety, or irritation.  · Difficulty concentrating.  · Dizziness.  · Strong cravings for sugary foods in addition to nicotine.  · Mild weight gain.  · Constipation.  · Nausea.  · Coughing or a sore throat.  · Changes in how your medicines work in your body.  · A depressed mood.  · Difficulty sleeping (insomnia).  After the first 2-3 weeks of quitting, you may start to notice more positive results, such as:  · Improved sense of smell and taste.  · Decreased coughing and sore throat.  · Slower heart rate.  · Lower blood pressure.  · Clearer skin.  · The ability to breathe more easily.  · Fewer sick days.  Quitting smoking is very challenging for most people. Do not get discouraged if you are not successful the first time. Some people need to make many attempts to quit before they achieve long-term success. Do your best to stick to your quit plan, and talk with your health care provider if you have any questions or concerns.  This information is not intended to replace advice given to you by your health care provider. Make sure you discuss any questions you have with your health care provider.  Document Released: 12/12/2002 Document Revised: 08/15/2017 Document Reviewed: 05/03/2016  Elsevier Interactive Patient Education © 2017 [employer] Inc.

## 2021-12-09 ENCOUNTER — TELEPHONE (OUTPATIENT)
Dept: INTERNAL MEDICINE | Facility: CLINIC | Age: 41
End: 2021-12-09

## 2021-12-09 DIAGNOSIS — D50.9 IRON DEFICIENCY ANEMIA, UNSPECIFIED IRON DEFICIENCY ANEMIA TYPE: Primary | ICD-10-CM

## 2021-12-09 NOTE — TELEPHONE ENCOUNTER
Called patient to discuss lab results. She voiced understanding and will return in one month to repeat cbc.

## 2021-12-09 NOTE — TELEPHONE ENCOUNTER
----- Message from Elena Taylor DO sent at 12/9/2021  7:24 AM CST -----  Hgb improving  Repeat CBC one month

## 2022-01-04 ENCOUNTER — CLINICAL SUPPORT (OUTPATIENT)
Dept: INTERNAL MEDICINE | Facility: CLINIC | Age: 42
End: 2022-01-04

## 2022-01-04 DIAGNOSIS — R50.9 FEVER, UNSPECIFIED FEVER CAUSE: ICD-10-CM

## 2022-01-04 DIAGNOSIS — R05.9 COUGH: Primary | ICD-10-CM

## 2022-01-04 LAB — SARS-COV-2 ORF1AB RESP QL NAA+PROBE: NOT DETECTED

## 2022-01-04 PROCEDURE — U0004 COV-19 TEST NON-CDC HGH THRU: HCPCS | Performed by: FAMILY MEDICINE

## 2022-01-04 PROCEDURE — 99211 OFF/OP EST MAY X REQ PHY/QHP: CPT | Performed by: FAMILY MEDICINE

## 2022-01-04 NOTE — PROGRESS NOTES
Mark Toney Coolidge  1980  [id number]    Verified patient's NAME and  before test was performed.     COVID-19 Test Performed:   Nasopharyngeal Swab     Location:  PATIENT VEHICLE     How did the patient tolerate the test?   Well tolerated by patient..    Staff Member Performing Test:  Barb Martin MA    PPE Worn:    mask, gloves, eye protection, face shield, gown and respirator

## 2022-01-05 ENCOUNTER — TELEPHONE (OUTPATIENT)
Dept: INTERNAL MEDICINE | Facility: CLINIC | Age: 42
End: 2022-01-05

## 2022-01-05 NOTE — TELEPHONE ENCOUNTER
Caller: Mark Huddleston    Relationship: Self    Best call back number:[phone]    What form or medical record are you requesting: NOTE FOR WORK    Who is requesting this form or medical record from you: PATIENT    Timeframe paperwork needed: ASAP    Additional notes: PATIENT NEEDS NOTE FOR WORK EXCUSING HER FROM 01/04/2022-UNTIL NEXT APPOINTMENT. PLEASE SEND THROUGH [employer].

## 2022-01-05 NOTE — TELEPHONE ENCOUNTER
COVID-19 Test Result   Telephone Encounter    Patient Name: Mark Huddleston   : 1980   MRN: [id number]     COVID19   Date Value Ref Range Status   2022 Not Detected Not Detected - Ref. Range Final        Patient was counseled as follows:  • (-) negative COVID-19 test result with or without symptoms   • The test is not perfect, so there is a chance it could be falsely negative or the virus level is too low for detection due to being very early in the infectious process.   • The optimal duration of home isolation is uncertain. The [place] Centers for Disease Control and Prevention (CDC) has issued recommendations on discontinuation of home isolation.   • For this reason, Mark is strongly encouraged to practice the safest standards in protecting their health and others given the current pandemic concerns. She is advised to:   o Practice social distancing in the community by staying at least 6 feet away from people   o Encouraged to use face mask while out in public   o Continue to wash their hands frequently with soap and hot water, and cover their mouth while coughing.   • If Mark is asymptomatic, she should self isolate for a total of 14 days from time of potential contact with Covid-19.   • If Mark is symptomatic then she may discontinue home isolation when the following criteria are met:   o At least seven days have passed since symptoms first appeared AND   o At least three days (72 hours) have passed since recovery of symptoms (defined as resolution of fever without the use of fever-reducing medications and improvement in respiratory symptoms [e.g., cough, shortness of breath])   • If Mark has been asymptomatic but then develops non-emergent symptoms such as mild increased shortness of breath, fever, cough, or for other questions, she  was asked to please call their primary care physician’s office or the [place] [employer] at [phone].   · Questions were engaged and answered  to the best of my ability. She         expressed verbal understanding of their test results and my advice.    Primary Care Physician verified as being: Elena Taylor DO      Electronically signed by Barb Martin MA, 01/05/22, 10:02 AM CST.

## 2022-01-10 RX ORDER — CYCLOBENZAPRINE HCL 10 MG
10 TABLET ORAL 2 TIMES DAILY PRN
Qty: 60 TABLET | Refills: 0 | Status: CANCELLED | OUTPATIENT
Start: 2022-01-10

## 2022-01-10 RX ORDER — TRAZODONE HYDROCHLORIDE 50 MG/1
50 TABLET ORAL NIGHTLY
Qty: 30 TABLET | Refills: 1 | Status: CANCELLED | OUTPATIENT
Start: 2022-01-10

## 2022-01-10 RX ORDER — LEVOCETIRIZINE DIHYDROCHLORIDE 5 MG/1
5 TABLET, FILM COATED ORAL EVERY EVENING
Qty: 30 TABLET | Refills: 1 | Status: CANCELLED | OUTPATIENT
Start: 2022-01-10

## 2022-01-10 NOTE — TELEPHONE ENCOUNTER
"Rx Refill Note  Requested Prescriptions     Pending Prescriptions Disp Refills   • levocetirizine (Xyzal) 5 MG tablet 30 tablet 1     Sig: Take 1 tablet by mouth Every Evening.   • traZODone (DESYREL) 50 MG tablet 30 tablet 1     Sig: Take 1 tablet by mouth Every Night.   • cyclobenzaprine (FLEXERIL) 10 MG tablet 60 tablet 0     Sig: Take 1 tablet by mouth 2 (Two) Times a Day As Needed for Muscle Spasms.   • cyanocobalamin 1000 MCG/ML injection 10 mL 0     Sig: Inject 1 mL into the appropriate muscle as directed by prescriber Every 28 (Twenty-Eight) Days.   • Syringe 20G X 1-1/2\" 3 ML misc 1 each 3     Si each Every 30 (Thirty) Days.      Last office visit with prescribing clinician: 2021      Next office visit with prescribing clinician: 2022            Taylor Gabriel RN  01/10/22, 12:34 CST    "

## 2022-01-11 RX ORDER — LEVOCETIRIZINE DIHYDROCHLORIDE 5 MG/1
5 TABLET, FILM COATED ORAL EVERY EVENING
Qty: 30 TABLET | Refills: 1 | Status: SHIPPED | OUTPATIENT
Start: 2022-01-11 | End: 2022-05-23

## 2022-01-11 RX ORDER — ALBUTEROL SULFATE 90 UG/1
2 AEROSOL, METERED RESPIRATORY (INHALATION) EVERY 4 HOURS PRN
Qty: 6.7 G | Refills: 0 | Status: SHIPPED | OUTPATIENT
Start: 2022-01-11 | End: 2022-05-03 | Stop reason: SDUPTHER

## 2022-01-11 RX ORDER — CYANOCOBALAMIN 1000 UG/ML
1000 INJECTION, SOLUTION INTRAMUSCULAR; SUBCUTANEOUS
Qty: 10 ML | Refills: 0 | Status: SHIPPED | OUTPATIENT
Start: 2022-01-11 | End: 2022-05-19 | Stop reason: SDUPTHER

## 2022-01-11 RX ORDER — CYCLOBENZAPRINE HCL 10 MG
10 TABLET ORAL 2 TIMES DAILY PRN
Qty: 60 TABLET | Refills: 0 | Status: SHIPPED | OUTPATIENT
Start: 2022-01-11 | End: 2022-03-03

## 2022-01-11 RX ORDER — SYRINGE W-NEEDLE,DISPOSAB,3 ML 25GX5/8"
1 SYRINGE, EMPTY DISPOSABLE MISCELLANEOUS
Qty: 1 EACH | Refills: 3 | Status: SHIPPED | OUTPATIENT
Start: 2022-01-11 | End: 2022-05-31 | Stop reason: SDUPTHER

## 2022-01-11 RX ORDER — TRAZODONE HYDROCHLORIDE 50 MG/1
50 TABLET ORAL NIGHTLY
Qty: 30 TABLET | Refills: 1 | Status: SHIPPED | OUTPATIENT
Start: 2022-01-11 | End: 2022-06-15

## 2022-01-11 NOTE — TELEPHONE ENCOUNTER
Rx Refill Note  Requested Prescriptions     Pending Prescriptions Disp Refills   • albuterol sulfate  (90 Base) MCG/ACT inhaler 6.7 g 0     Sig: Inhale 2 puffs Every 4 (Four) Hours As Needed for Wheezing or Shortness of Air.   • traZODone (DESYREL) 50 MG tablet 30 tablet 1     Sig: Take 1 tablet by mouth Every Night.   • levocetirizine (Xyzal) 5 MG tablet 30 tablet 1     Sig: Take 1 tablet by mouth Every Evening.   • cyclobenzaprine (FLEXERIL) 10 MG tablet 60 tablet 0     Sig: Take 1 tablet by mouth 2 (Two) Times a Day As Needed for Muscle Spasms.      Last office visit with prescribing clinician: 12/8/2021      Next office visit with prescribing clinician: 1/11/2022            Barb Martin MA  01/11/22, 10:48 CST

## 2022-03-03 RX ORDER — CYCLOBENZAPRINE HCL 10 MG
TABLET ORAL
Qty: 60 TABLET | Refills: 0 | Status: SHIPPED | OUTPATIENT
Start: 2022-03-03 | End: 2022-03-14

## 2022-03-03 NOTE — TELEPHONE ENCOUNTER
Attempted to call patient to see if she was following Dr. Taylor. No answer. Left  and asked for a return call. She doesn't have follow up appointment scheduled, but I'm guessing that may be because she has a radiology appointment scheduled in June and those results are needed. How would you like to proceed?

## 2022-03-08 RX ORDER — CYCLOBENZAPRINE HCL 10 MG
10 TABLET ORAL 2 TIMES DAILY PRN
Qty: 60 TABLET | Refills: 0 | OUTPATIENT
Start: 2022-03-08

## 2022-03-08 NOTE — TELEPHONE ENCOUNTER
Caller: Mark Huddleston    Relationship: Self    Best call back [phone]    Requested Prescriptions:   Requested Prescriptions     Pending Prescriptions Disp Refills   • cyclobenzaprine (FLEXERIL) 10 MG tablet 60 tablet 0     Sig: Take 1 tablet by mouth 2 (Two) Times a Day As Needed for Muscle Spasms.        Pharmacy where request should be sent: [facility] [address] [place] [address] [phone] [facility] [phone]      Additional details provided by patient: PATIENT IS GOING TO STAY WITH  PRACTICE     Does the patient have less than a 3 day supply:  [x] Yes  [] No    Trey Pride Rep   03/08/22 13:19 CST

## 2022-03-08 NOTE — TELEPHONE ENCOUNTER
Patient notified that Dr. Taylor sent in refill on 03/03/2022. She will stay in [place] for her care. She has an appointment scheduled with Ary 03/10/2022.

## 2022-03-14 ENCOUNTER — OFFICE VISIT (OUTPATIENT)
Dept: INTERNAL MEDICINE | Facility: CLINIC | Age: 42
End: 2022-03-14

## 2022-03-14 VITALS
WEIGHT: 213 LBS | SYSTOLIC BLOOD PRESSURE: 116 MMHG | BODY MASS INDEX: 31.55 KG/M2 | RESPIRATION RATE: 16 BRPM | OXYGEN SATURATION: 100 % | TEMPERATURE: 97.8 F | DIASTOLIC BLOOD PRESSURE: 75 MMHG | HEIGHT: 69 IN | HEART RATE: 114 BPM

## 2022-03-14 DIAGNOSIS — M54.2 CHRONIC NECK PAIN: Primary | ICD-10-CM

## 2022-03-14 DIAGNOSIS — D50.9 IRON DEFICIENCY ANEMIA, UNSPECIFIED IRON DEFICIENCY ANEMIA TYPE: ICD-10-CM

## 2022-03-14 DIAGNOSIS — G89.29 CHRONIC NECK PAIN: Primary | ICD-10-CM

## 2022-03-14 LAB
EXPIRATION DATE: ABNORMAL
HGB BLDA-MCNC: 6.4 G/DL (ref 12–17)
Lab: ABNORMAL

## 2022-03-14 PROCEDURE — 99214 OFFICE O/P EST MOD 30 MIN: CPT | Performed by: NURSE PRACTITIONER

## 2022-03-14 PROCEDURE — 85018 HEMOGLOBIN: CPT | Performed by: NURSE PRACTITIONER

## 2022-03-14 RX ORDER — BACLOFEN 10 MG/1
10 TABLET ORAL 3 TIMES DAILY
Qty: 90 TABLET | Refills: 1 | Status: SHIPPED | OUTPATIENT
Start: 2022-03-14 | End: 2022-05-19 | Stop reason: SDUPTHER

## 2022-03-14 RX ORDER — APIXABAN 5 MG/1
5 TABLET, FILM COATED ORAL EVERY 12 HOURS
COMMUNITY
Start: 2022-02-21

## 2022-03-14 NOTE — PROGRESS NOTES
Subjective     Chief Complaint   Patient presents with   • Neck Pain     Left Side of neck and moving to shoulder.    • Menorrhagia     Since she started blood thinner, she has had heavy periods.       History of Present Illness  Patient presents today with neck pain and heavy periods.     Reports heavy periods since staring Eliquis 5mg in October 2021 for subclavian clot.. She reports going through two boxes of tampons, 36 in each box during each period. Menstrual cycle typically last 6 days. She is experiencing fatigue and dizziness. Bleeding is bright red. No worsening menstrual cramping. Associated symptoms include fatigue and lightheadedness. She has not had a syncopal episode. She did have appointment scheduled with Dr. Barron at [facility] but was not able to keep appointment. LMP 3/8/22.    Left neck pain chronically for years. Couple weeks ago, pain started radiating down to her left shoulder and back. Patient has had increased activity due to home being destroyed by "[employer]" and working on getting home back together.   Shortness of breath continuing since COVID. Worse with activity. Albuterol is helping patient with her shortness of breath.     Review of Systems   Constitutional: Negative for activity change and fatigue.   HENT: Negative for mouth sores, nosebleeds, sinus pain and sore throat.    Respiratory: Positive for shortness of breath and wheezing. Negative for cough and chest tightness.    Cardiovascular: Positive for chest pain. Negative for palpitations and leg swelling.   Gastrointestinal: Negative for abdominal pain, diarrhea, nausea, rectal pain and vomiting.   Endocrine: Negative for cold intolerance and heat intolerance.   Genitourinary: Positive for menstrual problem and vaginal bleeding. Negative for difficulty urinating, dysuria, frequency, urgency, vaginal discharge and vaginal pain.   Musculoskeletal: Positive for neck pain.   Neurological: Positive for dizziness,  weakness and light-headedness. Negative for numbness and headaches.      Otherwise complete ROS reviewed and negative except as mentioned in the HPI.    Past Medical History:   Past Medical History:   Diagnosis Date   • Asthma      Past Surgical History:  Past Surgical History:   Procedure Laterality Date   •  SECTION     • TONSILLECTOMY     • TUBAL ABDOMINAL LIGATION       Social History:  reports that she has been smoking cigarettes. She has been smoking about 0.50 packs per day. She has never used smokeless tobacco. She reports previous alcohol use. She reports previous drug use.    Family History: family history includes Anxiety disorder in her mother; Breast cancer in her maternal grandmother; Depression in her mother; Heart disease in her father.       Allergies:  Allergies   Allergen Reactions   • Penicillins Hives     Medications:  Prior to Admission medications    Medication Sig Start Date End Date Taking? Authorizing Provider   albuterol sulfate  (90 Base) MCG/ACT inhaler Inhale 2 puffs Every 4 (Four) Hours As Needed for Wheezing or Shortness of Air. 22  Yes Elena Taylor DO   aspirin 81 MG EC tablet Take 1 tablet by mouth Daily. 10/27/21  Yes Den Agarwal MD   cyanocobalamin 1000 MCG/ML injection Inject 1 mL into the appropriate muscle as directed by prescriber Every 28 (Twenty-Eight) Days. 22  Yes Elena Taylor DO   cyclobenzaprine (FLEXERIL) 10 MG tablet TAKE 1 TABLET BY MOUTH TWICE DAILY AS NEEDED FOR MUSCLE SPASM 3/3/22  Yes Elena Taylor DO   Eliquis 5 MG tablet tablet Take 5 mg by mouth Every 12 (Twelve) Hours. 22  Yes Luis Dorsey MD   escitalopram (LEXAPRO) 10 MG tablet Take 10 mg by mouth Daily.   Yes Luis Dorsey MD   Iron, Ferrous Gluconate, 256 (28 Fe) MG tablet Take 1 tablet by mouth Daily. 21  Yes Jagjit Foster PA-C   levocetirizine (Xyzal) 5 MG tablet Take 1 tablet by mouth Every Evening. 22  Yes Brandon  "Elena Hernandez DO   rosuvastatin (Crestor) 5 MG tablet Take 1 tablet by mouth Daily. 10/26/21  Yes Den Agarwal MD   Syringe 20G X 1-1/2\" 3 ML misc 1 each Every 30 (Thirty) Days. 1/11/22  Yes Elena Taylor DO   traZODone (DESYREL) 50 MG tablet Take 1 tablet by mouth Every Night. 1/11/22  Yes Elena Taylor DO   nicotine (NICODERM CQ) 14 MG/24HR patch Place 1 patch on the skin as directed by provider Daily. 10/27/21   Den Agarwal MD   nicotine polacrilex (HM Nicotine Polacrilex) 2 MG lozenge Dissolve 1 lozenge in the mouth As Needed for Smoking Cessation. 11/17/21   Elena Taylor DO       Objective     Vital Signs: /75 (BP Location: Right arm, Patient Position: Sitting, Cuff Size: Adult)   Pulse 114   Temp 97.8 °F (36.6 °C)   Resp 16   Ht 175.3 cm (69\")   Wt 96.6 kg (213 lb)   SpO2 100%   BMI 31.45 kg/m²   Physical Exam  Vitals and nursing note reviewed.   Constitutional:       Appearance: She is normal weight.   HENT:      Head: Normocephalic.      Nose: No congestion or rhinorrhea.      Mouth/Throat:      Mouth: Mucous membranes are moist.      Pharynx: Oropharynx is clear.   Eyes:      Conjunctiva/sclera: Conjunctivae normal.      Pupils: Pupils are equal, round, and reactive to light.   Cardiovascular:      Rate and Rhythm: Normal rate.      Pulses: Normal pulses.      Heart sounds: Normal heart sounds. No murmur heard.    No gallop.   Pulmonary:      Effort: Pulmonary effort is normal. No respiratory distress.      Breath sounds: No decreased air movement. Examination of the right-upper field reveals rhonchi. Examination of the left-upper field reveals rhonchi. Examination of the right-lower field reveals rhonchi. Examination of the left-lower field reveals rhonchi. Rhonchi present. No decreased breath sounds.   Abdominal:      General: Abdomen is flat. Bowel sounds are normal. There is no distension.      Palpations: Abdomen is soft.      Tenderness: There is no abdominal " tenderness. There is no guarding.   Musculoskeletal:      Right hand: Normal strength. Normal sensation.      Left hand: Normal strength. Normal sensation.      Cervical back: Muscular tenderness present. No spinous process tenderness. Decreased range of motion.   Lymphadenopathy:      Cervical: No cervical adenopathy.   Skin:     General: Skin is warm and dry.      Capillary Refill: Capillary refill takes less than 2 seconds.      Coloration: Skin is pale.   Neurological:      General: No focal deficit present.      Mental Status: She is alert and oriented to person, place, and time. Mental status is at baseline.      Motor: No weakness.   Psychiatric:         Mood and Affect: Mood normal.         Behavior: Behavior normal.       Results Reviewed:  Glucose   Date Value Ref Range Status   11/29/2021 96 65 - 99 mg/dL Final     BUN   Date Value Ref Range Status   11/29/2021 10 6 - 20 mg/dL Final     Creatinine   Date Value Ref Range Status   11/29/2021 0.70 0.57 - 1.00 mg/dL Final     Sodium   Date Value Ref Range Status   11/29/2021 139 136 - 145 mmol/L Final     Potassium   Date Value Ref Range Status   11/29/2021 4.3 3.5 - 5.2 mmol/L Final     Chloride   Date Value Ref Range Status   11/29/2021 107 98 - 107 mmol/L Final     CO2   Date Value Ref Range Status   11/29/2021 25.0 22.0 - 29.0 mmol/L Final     Calcium   Date Value Ref Range Status   11/29/2021 8.7 8.6 - 10.5 mg/dL Final     ALT (SGPT)   Date Value Ref Range Status   11/29/2021 5 1 - 33 U/L Final     AST (SGOT)   Date Value Ref Range Status   11/29/2021 11 1 - 32 U/L Final     WBC   Date Value Ref Range Status   12/08/2021 6.10 3.40 - 10.80 10*3/mm3 Final     Hematocrit   Date Value Ref Range Status   12/08/2021 27.3 (L) 34.0 - 46.6 % Final     Platelets   Date Value Ref Range Status   12/08/2021 264 140 - 450 10*3/mm3 Final     Total Cholesterol   Date Value Ref Range Status   10/26/2021 174 0 - 200 mg/dL Final     Triglycerides   Date Value Ref Range  Status   10/26/2021 139 0 - 150 mg/dL Final     HDL Cholesterol   Date Value Ref Range Status   10/26/2021 40 40 - 60 mg/dL Final     LDL Cholesterol    Date Value Ref Range Status   10/26/2021 109 (H) 0 - 100 mg/dL Final     LDL/HDL Ratio   Date Value Ref Range Status   10/26/2021 2.66  Final     Hemoglobin A1C   Date Value Ref Range Status   10/26/2021 5.50 4.80 - 5.60 % Final         Assessment / Plan     Assessment/Plan:  Diagnoses and all orders for this visit:    1. Chronic neck pain (Primary)  -     XR Spine Cervical Complete 4 or 5 View (In Office)  -     baclofen (LIORESAL) 10 MG tablet; Take 1 tablet by mouth 3 (Three) Times a Day.  Dispense: 90 tablet; Refill: 1    2. Iron deficiency anemia, unspecified iron deficiency anemia type  -     POCT hemoglobin  (Result 6.4)  -     Iron, Ferrous Gluconate, 256 (28 Fe) MG tablet; Take 1 tablet by mouth 2 (Two) Times a Day.  Dispense: 60 tablet; Refill: 1  - Blood Transfusion   - [facility]   - 1 unit PRBC   - Type and Screen   - Premedicate with benadryl 25mg IV   - CBC and BMP prior to transfusion  - Referral to [facility] Dr. Barron for Menorrhagia     X-ray shows loss of cervical lordosis and degenerative changes    Return in about 1 week (around 3/21/2022) for Recheck. unless patient needs to be seen sooner or acute issues arise.    Code Status: Full.     I have discussed the patient results/orders and and plan/recommendation with them at today's visit.      Louisa Cook, APRN   03/14/2022

## 2022-05-03 RX ORDER — ALBUTEROL SULFATE 90 UG/1
2 AEROSOL, METERED RESPIRATORY (INHALATION) EVERY 4 HOURS PRN
Qty: 6.7 G | Refills: 0 | Status: SHIPPED | OUTPATIENT
Start: 2022-05-03 | End: 2022-05-12 | Stop reason: SDUPTHER

## 2022-05-03 NOTE — TELEPHONE ENCOUNTER
Caller: Mark Huddleston    Relationship: Self    Best call back number: [phone]    Requested Prescriptions:   Requested Prescriptions     Pending Prescriptions Disp Refills   • albuterol sulfate  (90 Base) MCG/ACT inhaler 6.7 g 0     Sig: Inhale 2 puffs Every 4 (Four) Hours As Needed for Wheezing or Shortness of Air.        Pharmacy where request should be sent: [address] [phone] [facility] [phone] [facility]     Additional details provided by patient: PATIENT STATES SHE IS COMPLETELY OUT AND WAS TOLD BY THE PHARMACY THEY SENT A REQUEST ON THE 28TH    Does the patient have less than a 3 day supply:  [x] Yes  [] No    Trey Prado Rep   05/03/22 11:50 CDT

## 2022-05-03 NOTE — TELEPHONE ENCOUNTER
Rx Refill Note  Requested Prescriptions     Pending Prescriptions Disp Refills   • albuterol sulfate  (90 Base) MCG/ACT inhaler 6.7 g 0     Sig: Inhale 2 puffs Every 4 (Four) Hours As Needed for Wheezing or Shortness of Air.      Last office visit with prescribing clinician: 3/14/2022      Next office visit with prescribing clinician: Visit date not found            Barb Martin MA  05/03/22, 12:58 CDT

## 2022-05-04 ENCOUNTER — OFFICE VISIT (OUTPATIENT)
Dept: INTERNAL MEDICINE | Facility: CLINIC | Age: 42
End: 2022-05-04

## 2022-05-04 VITALS
HEART RATE: 85 BPM | RESPIRATION RATE: 17 BRPM | TEMPERATURE: 99.1 F | OXYGEN SATURATION: 98 % | HEIGHT: 69 IN | BODY MASS INDEX: 32.67 KG/M2 | DIASTOLIC BLOOD PRESSURE: 78 MMHG | WEIGHT: 220.6 LBS | SYSTOLIC BLOOD PRESSURE: 114 MMHG

## 2022-05-04 DIAGNOSIS — F33.1 MODERATE EPISODE OF RECURRENT MAJOR DEPRESSIVE DISORDER: ICD-10-CM

## 2022-05-04 DIAGNOSIS — F41.9 ANXIETY: ICD-10-CM

## 2022-05-04 DIAGNOSIS — N92.0 MENORRHAGIA WITH REGULAR CYCLE: Primary | ICD-10-CM

## 2022-05-04 DIAGNOSIS — D17.1 LIPOMA OF TORSO: Primary | ICD-10-CM

## 2022-05-04 DIAGNOSIS — M79.89 MASS OF SOFT TISSUE OF CHEST: ICD-10-CM

## 2022-05-04 PROCEDURE — 99214 OFFICE O/P EST MOD 30 MIN: CPT

## 2022-05-04 RX ORDER — HYDROXYZINE HYDROCHLORIDE 10 MG/1
10 TABLET, FILM COATED ORAL 3 TIMES DAILY PRN
Qty: 60 TABLET | Refills: 0 | Status: SHIPPED | OUTPATIENT
Start: 2022-05-04

## 2022-05-04 NOTE — PROGRESS NOTES
"        Subjective     Chief Complaint   Patient presents with   • Depression     Would like to discuss changing medication.    • Anxiety   • Cyst     On ribs.        History of Present Illness  Patients states she would like to see about switching medications for depression. Currently on Lexapro, does not feel controlled, also feels like it is making her gain weight. We discussed gene sight. No homicidal or suicidal ideations.    Also reports feeling a \"cyst\" on her right rib cage about a month ago. Is starting to hurt her when she sleeps, especially when rolling over. Non tender with palpation. No chest pain.   Patient requesting GYN referral to discuss possible hysterectomy. States she is on blood thinner, for a clot, and it makes her periods \"uncontrollable.\" Denies any pelvic or abdominal pain.   Patient's PMR from outside medical facility reviewed and noted.    Review of Systems   Constitutional: Negative for activity change, fatigue and unexpected weight change.   HENT: Negative for congestion, ear pain, mouth sores and trouble swallowing.    Eyes: Negative for discharge and visual disturbance.   Respiratory: Negative for cough and shortness of breath.    Cardiovascular: Negative for chest pain and leg swelling.   Gastrointestinal: Negative for abdominal pain, constipation, diarrhea and nausea.   Genitourinary: Positive for menstrual problem and vaginal bleeding. Negative for decreased urine volume, difficulty urinating and hematuria.        Heavy bleeding on menstrual cycle   Musculoskeletal: Negative for back pain and gait problem.        \"cyst on my right rib cage\"   Skin: Negative for color change and rash.   Allergic/Immunologic: Negative for environmental allergies and immunocompromised state.   Neurological: Negative for speech difficulty, weakness and headaches.   Psychiatric/Behavioral: Positive for dysphoric mood. Negative for confusion, sleep disturbance and suicidal ideas. The patient is " nervous/anxious.         Otherwise complete ROS reviewed and negative except as mentioned in the HPI.    Past Medical History:   Past Medical History:   Diagnosis Date   • Asthma      Past Surgical History:  Past Surgical History:   Procedure Laterality Date   •  SECTION     • TONSILLECTOMY     • TUBAL ABDOMINAL LIGATION       Social History:  reports that she has been smoking cigarettes. She has been smoking about 0.50 packs per day. She has never used smokeless tobacco. She reports previous alcohol use. She reports previous drug use.    Family History: family history includes Anxiety disorder in her mother; Breast cancer in her maternal grandmother; Depression in her mother; Heart disease in her father.      Allergies:  Allergies   Allergen Reactions   • Penicillins Hives     Medications:  Prior to Admission medications    Medication Sig Start Date End Date Taking? Authorizing Provider   albuterol sulfate  (90 Base) MCG/ACT inhaler Inhale 2 puffs Every 4 (Four) Hours As Needed for Wheezing or Shortness of Air. 5/3/22  Yes Yuki Iniguez APRN   aspirin 81 MG EC tablet Take 1 tablet by mouth Daily. 10/27/21  Yes Den Agarwal MD   baclofen (LIORESAL) 10 MG tablet Take 1 tablet by mouth 3 (Three) Times a Day. 3/14/22  Yes Louisa Cook APRN   cyanocobalamin 1000 MCG/ML injection Inject 1 mL into the appropriate muscle as directed by prescriber Every 28 (Twenty-Eight) Days. 22  Yes Elena Taylor DO   Eliquis 5 MG tablet tablet Take 5 mg by mouth Every 12 (Twelve) Hours. 22  Yes Luis Dorsey MD   escitalopram (LEXAPRO) 10 MG tablet Take 10 mg by mouth Daily.   Yes ProviderLuis MD   Iron, Ferrous Gluconate, 256 (28 Fe) MG tablet Take 1 tablet by mouth 2 (Two) Times a Day. 3/14/22  Yes Louisa Cook APRN   levocetirizine (Xyzal) 5 MG tablet Take 1 tablet by mouth Every Evening. 22  Yes Elena Taylor DO   nicotine (NICODERM CQ) 14  "MG/24HR patch Place 1 patch on the skin as directed by provider Daily. 10/27/21  Yes Den Agarwal MD   nicotine polacrilex (HM Nicotine Polacrilex) 2 MG lozenge Dissolve 1 lozenge in the mouth As Needed for Smoking Cessation. 11/17/21  Yes Elena Taylor DO   rosuvastatin (Crestor) 5 MG tablet Take 1 tablet by mouth Daily. 10/26/21  Yes Den Agarwal MD   Syringe 20G X 1-1/2\" 3 ML misc 1 each Every 30 (Thirty) Days. 1/11/22  Yes Elena Taylor DO   traZODone (DESYREL) 50 MG tablet Take 1 tablet by mouth Every Night. 1/11/22  Yes Elena Taylor DO         Objective     Vital Signs: /78 (BP Location: Left arm, Patient Position: Sitting, Cuff Size: Adult)   Pulse 85   Temp 99.1 °F (37.3 °C) (Skin)   Resp 17   Ht 175.3 cm (69\")   Wt 100 kg (220 lb 9.6 oz)   SpO2 98%   BMI 32.58 kg/m²   Physical Exam  Constitutional:       General: She is not in acute distress.     Appearance: Normal appearance. She is obese. She is not ill-appearing.   HENT:      Head: Normocephalic and atraumatic.      Right Ear: External ear normal.      Left Ear: External ear normal.      Nose: Nose normal.      Mouth/Throat:      Mouth: Mucous membranes are moist.      Pharynx: No posterior oropharyngeal erythema.   Eyes:      General: No scleral icterus.     Extraocular Movements: Extraocular movements intact.      Conjunctiva/sclera: Conjunctivae normal.      Pupils: Pupils are equal, round, and reactive to light.   Cardiovascular:      Rate and Rhythm: Normal rate and regular rhythm.      Pulses: Normal pulses.      Heart sounds: Normal heart sounds.   Pulmonary:      Effort: Pulmonary effort is normal. No respiratory distress.      Breath sounds: Normal breath sounds. No wheezing.   Abdominal:      General: Abdomen is flat. Bowel sounds are normal.      Palpations: Abdomen is soft.      Tenderness: There is no abdominal tenderness.   Musculoskeletal:         General: Normal range of motion.      Cervical back: " "Normal range of motion.      Right lower leg: No edema.      Left lower leg: No edema.      Comments: Moveable \"knot\" on right rib cage. Patient states it is sore to touch   Skin:     General: Skin is warm and dry.      Findings: No erythema or rash.   Neurological:      General: No focal deficit present.      Mental Status: She is alert and oriented to person, place, and time. Mental status is at baseline.      Motor: No weakness.   Psychiatric:         Mood and Affect: Mood normal.         Behavior: Behavior normal.         Thought Content: Thought content normal.         Judgment: Judgment normal.                    Results Reviewed:  Glucose   Date Value Ref Range Status   11/29/2021 96 65 - 99 mg/dL Final     BUN   Date Value Ref Range Status   11/29/2021 10 6 - 20 mg/dL Final     Creatinine   Date Value Ref Range Status   11/29/2021 0.70 0.57 - 1.00 mg/dL Final     Sodium   Date Value Ref Range Status   11/29/2021 139 136 - 145 mmol/L Final     Potassium   Date Value Ref Range Status   11/29/2021 4.3 3.5 - 5.2 mmol/L Final     Chloride   Date Value Ref Range Status   11/29/2021 107 98 - 107 mmol/L Final     CO2   Date Value Ref Range Status   11/29/2021 25.0 22.0 - 29.0 mmol/L Final     Calcium   Date Value Ref Range Status   11/29/2021 8.7 8.6 - 10.5 mg/dL Final     ALT (SGPT)   Date Value Ref Range Status   11/29/2021 5 1 - 33 U/L Final     AST (SGOT)   Date Value Ref Range Status   11/29/2021 11 1 - 32 U/L Final     WBC   Date Value Ref Range Status   12/08/2021 6.10 3.40 - 10.80 10*3/mm3 Final     Hematocrit   Date Value Ref Range Status   12/08/2021 27.3 (L) 34.0 - 46.6 % Final     Platelets   Date Value Ref Range Status   12/08/2021 264 140 - 450 10*3/mm3 Final     Total Cholesterol   Date Value Ref Range Status   10/26/2021 174 0 - 200 mg/dL Final     Triglycerides   Date Value Ref Range Status   10/26/2021 139 0 - 150 mg/dL Final     HDL Cholesterol   Date Value Ref Range Status   10/26/2021 40 40 - 60 " mg/dL Final     LDL Cholesterol    Date Value Ref Range Status   10/26/2021 109 (H) 0 - 100 mg/dL Final     LDL/HDL Ratio   Date Value Ref Range Status   10/26/2021 2.66  Final     Hemoglobin A1C   Date Value Ref Range Status   10/26/2021 5.50 4.80 - 5.60 % Final         Assessment / Plan     Assessment/Plan:  1. Menorrhagia with regular cycle  - Ambulatory Referral to Gynecology    2. Anxiety  - hydrOXYzine (ATARAX) 10 MG tablet; Take 1 tablet by mouth 3 (Three) Times a Day As Needed for Anxiety.  Dispense: 60 tablet; Refill: 0  - GeneSight - Swab,    3. Moderate episode of recurrent major depressive disorder (HCC)  - GeneSight - Swab,    4. Mass of soft tissue of chest  - US Soft Tissue; Future      Advised utilize atarax for anxiety flare ups. Educated to not stop lexapro, best to wait until gene sight results and determine if best to switch medications at that time. Follow up with PCP Louisa CARR for gene sight results in 2 weeks.   Return for Annual physical. unless patient needs to be seen sooner or acute issues arise.      I have discussed the patient results/orders and and plan/recommendation with them at today's visit.      LILLY Garza   05/04/2022

## 2022-05-12 RX ORDER — ALBUTEROL SULFATE 90 UG/1
2 AEROSOL, METERED RESPIRATORY (INHALATION) EVERY 4 HOURS PRN
Qty: 6.7 G | Refills: 6 | Status: SHIPPED | OUTPATIENT
Start: 2022-05-12 | End: 2022-05-19 | Stop reason: SDUPTHER

## 2022-05-17 ENCOUNTER — PRIOR AUTHORIZATION (OUTPATIENT)
Dept: INTERNAL MEDICINE | Facility: CLINIC | Age: 42
End: 2022-05-17

## 2022-05-19 ENCOUNTER — OFFICE VISIT (OUTPATIENT)
Dept: INTERNAL MEDICINE | Facility: CLINIC | Age: 42
End: 2022-05-19

## 2022-05-19 VITALS
BODY MASS INDEX: 32.58 KG/M2 | HEIGHT: 69 IN | SYSTOLIC BLOOD PRESSURE: 124 MMHG | TEMPERATURE: 98.7 F | RESPIRATION RATE: 16 BRPM | HEART RATE: 86 BPM | OXYGEN SATURATION: 95 % | WEIGHT: 220 LBS | DIASTOLIC BLOOD PRESSURE: 77 MMHG

## 2022-05-19 DIAGNOSIS — D50.9 IRON DEFICIENCY ANEMIA, UNSPECIFIED IRON DEFICIENCY ANEMIA TYPE: ICD-10-CM

## 2022-05-19 DIAGNOSIS — Z00.00 ROUTINE GENERAL MEDICAL EXAMINATION AT A HEALTH CARE FACILITY: ICD-10-CM

## 2022-05-19 DIAGNOSIS — Z72.0 TOBACCO ABUSE: ICD-10-CM

## 2022-05-19 DIAGNOSIS — F33.1 MODERATE EPISODE OF RECURRENT MAJOR DEPRESSIVE DISORDER: ICD-10-CM

## 2022-05-19 DIAGNOSIS — E53.8 B12 DEFICIENCY: ICD-10-CM

## 2022-05-19 DIAGNOSIS — G89.29 CHRONIC NECK PAIN: ICD-10-CM

## 2022-05-19 DIAGNOSIS — F41.9 ANXIETY: Primary | ICD-10-CM

## 2022-05-19 DIAGNOSIS — M54.2 CHRONIC NECK PAIN: ICD-10-CM

## 2022-05-19 DIAGNOSIS — R73.01 IMPAIRED FASTING GLUCOSE: ICD-10-CM

## 2022-05-19 PROCEDURE — 99396 PREV VISIT EST AGE 40-64: CPT | Performed by: NURSE PRACTITIONER

## 2022-05-19 RX ORDER — BUPROPION HYDROCHLORIDE 150 MG/1
150 TABLET ORAL DAILY
Qty: 30 TABLET | Refills: 6 | Status: SHIPPED | OUTPATIENT
Start: 2022-05-19 | End: 2022-09-15 | Stop reason: SDUPTHER

## 2022-05-19 RX ORDER — ALBUTEROL SULFATE 90 UG/1
2 AEROSOL, METERED RESPIRATORY (INHALATION) EVERY 4 HOURS PRN
Qty: 6.7 G | Refills: 6 | Status: SHIPPED | OUTPATIENT
Start: 2022-05-19 | End: 2022-07-12 | Stop reason: SDUPTHER

## 2022-05-19 RX ORDER — BUSPIRONE HYDROCHLORIDE 5 MG/1
5 TABLET ORAL 3 TIMES DAILY
Qty: 90 TABLET | Refills: 1 | Status: SHIPPED | OUTPATIENT
Start: 2022-05-19

## 2022-05-19 RX ORDER — CYANOCOBALAMIN 1000 UG/ML
1000 INJECTION, SOLUTION INTRAMUSCULAR; SUBCUTANEOUS
Qty: 10 ML | Refills: 0 | Status: SHIPPED | OUTPATIENT
Start: 2022-05-19 | End: 2022-05-31 | Stop reason: SDUPTHER

## 2022-05-19 RX ORDER — BACLOFEN 10 MG/1
10 TABLET ORAL 3 TIMES DAILY
Qty: 90 TABLET | Refills: 1 | Status: SHIPPED | OUTPATIENT
Start: 2022-05-19

## 2022-05-19 NOTE — PROGRESS NOTES
Subjective     Chief Complaint   Patient presents with   • Annual Exam       History of Present Illness  Pt presents for her annual physical. She states she feels her anxiety is worsening and her depression is not controlled either. She is currently on Lexapro 10mg daily and hydroxyzine 10mg PRN.   Pt is up to date on her mammogram 2021.   She does smoke since age 15, 1ppd.   She c/o increased weight gain. Denies changes in food consumption. States she feels like she lives an active lifestyle.     Review of Systems   Constitutional: Negative for activity change, appetite change, fatigue and fever.   HENT: Positive for congestion and rhinorrhea. Negative for sinus pressure and sinus pain.    Eyes: Negative.    Respiratory: Positive for shortness of breath (chronic). Negative for wheezing.    Cardiovascular: Negative for chest pain and palpitations.   Gastrointestinal: Negative for blood in stool, constipation, diarrhea, nausea and vomiting.   Endocrine: Negative.    Genitourinary: Negative for dysuria, frequency and urgency.   Musculoskeletal: Negative for arthralgias, joint swelling and myalgias.   Skin: Negative.    Allergic/Immunologic: Positive for environmental allergies.   Neurological: Negative for weakness and headaches.   Hematological: Negative.    Psychiatric/Behavioral: Positive for decreased concentration and sleep disturbance. The patient is nervous/anxious.         Otherwise complete ROS reviewed and negative except as mentioned in the HPI.    Past Medical History:   Past Medical History:   Diagnosis Date   • Asthma      Past Surgical History:  Past Surgical History:   Procedure Laterality Date   •  SECTION     • TONSILLECTOMY     • TUBAL ABDOMINAL LIGATION       Social History:  reports that she has been smoking cigarettes. She has a 15.00 pack-year smoking history. She has never used smokeless tobacco. She reports previous alcohol use. She reports previous drug use.    Family  "History: family history includes Anxiety disorder in her mother; Breast cancer in her maternal grandmother; Depression in her mother; Heart disease in her father.       Allergies:  Allergies   Allergen Reactions   • Penicillins Hives     Medications:  Prior to Admission medications    Medication Sig Start Date End Date Taking? Authorizing Provider   albuterol sulfate  (90 Base) MCG/ACT inhaler Inhale 2 puffs Every 4 (Four) Hours As Needed for Wheezing or Shortness of Air. 5/12/22  Yes Louisa Cook APRN   aspirin 81 MG EC tablet Take 1 tablet by mouth Daily. 10/27/21  Yes Den Agarwal MD   baclofen (LIORESAL) 10 MG tablet Take 1 tablet by mouth 3 (Three) Times a Day. 3/14/22  Yes Louisa Cook APRN   cyanocobalamin 1000 MCG/ML injection Inject 1 mL into the appropriate muscle as directed by prescriber Every 28 (Twenty-Eight) Days. 1/11/22  Yes Elena Taylor DO   Eliquis 5 MG tablet tablet Take 5 mg by mouth Every 12 (Twelve) Hours. 2/21/22  Yes Luis Dorsey MD   escitalopram (LEXAPRO) 10 MG tablet Take 10 mg by mouth Daily.   Yes ProviderLuis MD   hydrOXYzine (ATARAX) 10 MG tablet Take 1 tablet by mouth 3 (Three) Times a Day As Needed for Anxiety. 5/4/22  Yes Yuki Iniguez APRN   Iron, Ferrous Gluconate, 256 (28 Fe) MG tablet Take 1 tablet by mouth 2 (Two) Times a Day. 3/14/22  Yes Louisa Cook APRN   levocetirizine (Xyzal) 5 MG tablet Take 1 tablet by mouth Every Evening. 1/11/22  Yes Elena Taylor DO   nicotine (NICODERM CQ) 14 MG/24HR patch Place 1 patch on the skin as directed by provider Daily. 10/27/21  Yes Den Agarwal MD   nicotine polacrilex (HM Nicotine Polacrilex) 2 MG lozenge Dissolve 1 lozenge in the mouth As Needed for Smoking Cessation. 11/17/21  Yes Elena Taylor DO   rosuvastatin (Crestor) 5 MG tablet Take 1 tablet by mouth Daily. 10/26/21  Yes Den Agarwal MD   Syringe 20G X 1-1/2\" 3 ML misc 1 each Every 30 " "(Thirty) Days. 1/11/22  Yes Elena Taylor DO   traZODone (DESYREL) 50 MG tablet Take 1 tablet by mouth Every Night. 1/11/22  Yes Elena Taylor DO       Objective     Vital Signs: /77 (BP Location: Left arm, Patient Position: Sitting, Cuff Size: Adult)   Pulse 86   Temp 98.7 °F (37.1 °C) (Infrared)   Resp 16   Ht 175.3 cm (69.02\")   Wt 99.8 kg (220 lb)   SpO2 95%   BMI 32.47 kg/m²   Physical Exam  Vitals reviewed.   Constitutional:       General: She is not in acute distress.     Appearance: Normal appearance. She is well-developed. She is obese. She is not ill-appearing.   HENT:      Head: Normocephalic and atraumatic.      Nose: Nose normal.      Mouth/Throat:      Mouth: Mucous membranes are moist.      Pharynx: Oropharynx is clear.   Eyes:      Pupils: Pupils are equal, round, and reactive to light.   Neck:      Vascular: No JVD.   Cardiovascular:      Rate and Rhythm: Normal rate and regular rhythm.      Heart sounds: Normal heart sounds.   Pulmonary:      Effort: Pulmonary effort is normal.      Breath sounds: Normal breath sounds.   Abdominal:      General: Bowel sounds are normal.      Palpations: Abdomen is soft.   Musculoskeletal:         General: No swelling, tenderness or deformity.      Cervical back: Normal range of motion and neck supple.   Lymphadenopathy:      Cervical: No cervical adenopathy.   Skin:     General: Skin is warm and dry.   Neurological:      General: No focal deficit present.      Mental Status: She is alert and oriented to person, place, and time.   Psychiatric:         Behavior: Behavior normal.         Thought Content: Thought content normal.         Judgment: Judgment normal.         BMI is >= 30 and <= 34.9 (Class 1 obesity). The following options were offered after discussion: exercise counseling/recommendations and nutrition counseling/recommendations      Results Reviewed:  Glucose   Date Value Ref Range Status   11/29/2021 96 65 - 99 mg/dL Final "     BUN   Date Value Ref Range Status   11/29/2021 10 6 - 20 mg/dL Final     Creatinine   Date Value Ref Range Status   11/29/2021 0.70 0.57 - 1.00 mg/dL Final     Sodium   Date Value Ref Range Status   11/29/2021 139 136 - 145 mmol/L Final     Potassium   Date Value Ref Range Status   11/29/2021 4.3 3.5 - 5.2 mmol/L Final     Chloride   Date Value Ref Range Status   11/29/2021 107 98 - 107 mmol/L Final     CO2   Date Value Ref Range Status   11/29/2021 25.0 22.0 - 29.0 mmol/L Final     Calcium   Date Value Ref Range Status   11/29/2021 8.7 8.6 - 10.5 mg/dL Final     ALT (SGPT)   Date Value Ref Range Status   11/29/2021 5 1 - 33 U/L Final     AST (SGOT)   Date Value Ref Range Status   11/29/2021 11 1 - 32 U/L Final     WBC   Date Value Ref Range Status   12/08/2021 6.10 3.40 - 10.80 10*3/mm3 Final     Hematocrit   Date Value Ref Range Status   12/08/2021 27.3 (L) 34.0 - 46.6 % Final     Platelets   Date Value Ref Range Status   12/08/2021 264 140 - 450 10*3/mm3 Final     Total Cholesterol   Date Value Ref Range Status   10/26/2021 174 0 - 200 mg/dL Final     Triglycerides   Date Value Ref Range Status   10/26/2021 139 0 - 150 mg/dL Final     HDL Cholesterol   Date Value Ref Range Status   10/26/2021 40 40 - 60 mg/dL Final     LDL Cholesterol    Date Value Ref Range Status   10/26/2021 109 (H) 0 - 100 mg/dL Final     LDL/HDL Ratio   Date Value Ref Range Status   10/26/2021 2.66  Final     Hemoglobin A1C   Date Value Ref Range Status   10/26/2021 5.50 4.80 - 5.60 % Final         Assessment / Plan     Assessment/Plan:  Diagnoses and all orders for this visit:    1. Anxiety (Primary)  -     busPIRone (BUSPAR) 5 MG tablet; Take 1 tablet by mouth 3 (Three) Times a Day.  Dispense: 90 tablet; Refill: 1    2. Chronic neck pain  -     baclofen (LIORESAL) 10 MG tablet; Take 1 tablet by mouth 3 (Three) Times a Day.  Dispense: 90 tablet; Refill: 1    3. Moderate episode of recurrent major depressive disorder (HCC)  -      buPROPion XL (Wellbutrin XL) 150 MG 24 hr tablet; Take 1 tablet by mouth Daily.  Dispense: 30 tablet; Refill: 6    4. Iron deficiency anemia, unspecified iron deficiency anemia type  -     CBC & Differential    5. Routine general medical examination at a health care facility  -     Comprehensive Metabolic Panel  -     Lipid Panel  -     TSH  -     T4, free    6. Impaired fasting glucose  -     Hemoglobin A1c    7. B12 deficiency  -     cyanocobalamin 1000 MCG/ML injection; Inject 1 mL into the appropriate muscle as directed by prescriber Every 28 (Twenty-Eight) Days.  Dispense: 10 mL; Refill: 0  -     Vitamin B12    8. Tobacco abuse  -     albuterol sulfate  (90 Base) MCG/ACT inhaler; Inhale 2 puffs Every 4 (Four) Hours As Needed for Wheezing or Shortness of Air.  Dispense: 6.7 g; Refill: 6        Code Status: Full.     I have discussed the patient results/orders and and plan/recommendation with them at today's visit.      Louisa Cook, APRN   05/19/2022

## 2022-05-21 LAB
ALBUMIN SERPL-MCNC: 3.9 G/DL (ref 3.8–4.8)
ALBUMIN/GLOB SERPL: 2.2 {RATIO} (ref 1.2–2.2)
ALP SERPL-CCNC: 53 IU/L (ref 44–121)
ALT SERPL-CCNC: 7 IU/L (ref 0–32)
AST SERPL-CCNC: 19 IU/L (ref 0–40)
BASOPHILS # BLD AUTO: 0.1 X10E3/UL (ref 0–0.2)
BASOPHILS NFR BLD AUTO: 1 %
BILIRUB SERPL-MCNC: 0.4 MG/DL (ref 0–1.2)
BUN SERPL-MCNC: 7 MG/DL (ref 6–24)
BUN/CREAT SERPL: 9 (ref 9–23)
CALCIUM SERPL-MCNC: 8.9 MG/DL (ref 8.7–10.2)
CHLORIDE SERPL-SCNC: 104 MMOL/L (ref 96–106)
CHOLEST SERPL-MCNC: 131 MG/DL (ref 100–199)
CO2 SERPL-SCNC: 20 MMOL/L (ref 20–29)
CREAT SERPL-MCNC: 0.82 MG/DL (ref 0.57–1)
EGFRCR SERPLBLD CKD-EPI 2021: 92 ML/MIN/1.73
EOSINOPHIL # BLD AUTO: 0.3 X10E3/UL (ref 0–0.4)
EOSINOPHIL NFR BLD AUTO: 5 %
ERYTHROCYTE [DISTWIDTH] IN BLOOD BY AUTOMATED COUNT: 19.2 % (ref 11.7–15.4)
GLOBULIN SER CALC-MCNC: 1.8 G/DL (ref 1.5–4.5)
GLUCOSE SERPL-MCNC: 89 MG/DL (ref 65–99)
HBA1C MFR BLD: 5.4 % (ref 4.8–5.6)
HCT VFR BLD AUTO: 26 % (ref 34–46.6)
HDLC SERPL-MCNC: 46 MG/DL
HGB BLD-MCNC: 6.9 G/DL (ref 11.1–15.9)
IMM GRANULOCYTES # BLD AUTO: 0 X10E3/UL (ref 0–0.1)
IMM GRANULOCYTES NFR BLD AUTO: 0 %
LDLC SERPL CALC-MCNC: 74 MG/DL (ref 0–99)
LYMPHOCYTES # BLD AUTO: 1.8 X10E3/UL (ref 0.7–3.1)
LYMPHOCYTES NFR BLD AUTO: 34 %
MCH RBC QN AUTO: 18.1 PG (ref 26.6–33)
MCHC RBC AUTO-ENTMCNC: 26.5 G/DL (ref 31.5–35.7)
MCV RBC AUTO: 68 FL (ref 79–97)
MONOCYTES # BLD AUTO: 0.6 X10E3/UL (ref 0.1–0.9)
MONOCYTES NFR BLD AUTO: 10 %
NEUTROPHILS # BLD AUTO: 2.6 X10E3/UL (ref 1.4–7)
NEUTROPHILS NFR BLD AUTO: 50 %
PLATELET # BLD AUTO: 285 X10E3/UL (ref 150–450)
POTASSIUM SERPL-SCNC: 4 MMOL/L (ref 3.5–5.2)
PROT SERPL-MCNC: 5.7 G/DL (ref 6–8.5)
RBC # BLD AUTO: 3.82 X10E6/UL (ref 3.77–5.28)
SODIUM SERPL-SCNC: 138 MMOL/L (ref 134–144)
T4 FREE SERPL-MCNC: 1.01 NG/DL (ref 0.82–1.77)
TRIGL SERPL-MCNC: 47 MG/DL (ref 0–149)
VIT B12 SERPL-MCNC: 329 PG/ML (ref 232–1245)
VLDLC SERPL CALC-MCNC: 11 MG/DL (ref 5–40)
WBC # BLD AUTO: 5.4 X10E3/UL (ref 3.4–10.8)

## 2022-05-23 ENCOUNTER — TELEPHONE (OUTPATIENT)
Dept: INTERNAL MEDICINE | Facility: CLINIC | Age: 42
End: 2022-05-23

## 2022-05-23 RX ORDER — LEVOCETIRIZINE DIHYDROCHLORIDE 5 MG/1
TABLET, FILM COATED ORAL
Qty: 30 TABLET | Refills: 6 | Status: ON HOLD | OUTPATIENT
Start: 2022-05-23 | End: 2022-11-03

## 2022-05-23 NOTE — TELEPHONE ENCOUNTER
Rx Refill Note  Requested Prescriptions     Pending Prescriptions Disp Refills   • levocetirizine (XYZAL) 5 MG tablet [Pharmacy Med Name: Levocetirizine Dihydrochloride 5 MG Oral Tablet] 30 tablet 0     Sig: TAKE 1 TABLET BY MOUTH ONCE DAILY IN THE EVENING      Last office visit with prescribing clinician: 5/19/22     Next office visit with prescribing clinician: 6/21/22            Taylor Gabriel RN  05/23/22, 07:36 CDT

## 2022-05-23 NOTE — TELEPHONE ENCOUNTER
Caller: Mark Huddleston    Relationship to patient: Self    Best call back number: [phone]    Patient was attempting to return a call in regards to lab results.

## 2022-05-31 RX ORDER — ALBUTEROL SULFATE 90 UG/1
2 AEROSOL, METERED RESPIRATORY (INHALATION) EVERY 4 HOURS PRN
Qty: 6.7 G | Refills: 6 | Status: CANCELLED | OUTPATIENT
Start: 2022-05-31

## 2022-06-01 DIAGNOSIS — D64.9 LOW HEMOGLOBIN: Primary | ICD-10-CM

## 2022-06-01 DIAGNOSIS — Z72.0 TOBACCO ABUSE: ICD-10-CM

## 2022-06-01 DIAGNOSIS — E53.8 B12 DEFICIENCY: ICD-10-CM

## 2022-06-01 RX ORDER — CYANOCOBALAMIN 1000 UG/ML
1000 INJECTION, SOLUTION INTRAMUSCULAR; SUBCUTANEOUS
Qty: 10 ML | Refills: 0 | Status: SHIPPED | OUTPATIENT
Start: 2022-06-01 | End: 2022-07-12 | Stop reason: SDUPTHER

## 2022-06-01 RX ORDER — SYRINGE W-NEEDLE,DISPOSAB,3 ML 25GX5/8"
1 SYRINGE, EMPTY DISPOSABLE MISCELLANEOUS
Qty: 1 EACH | Refills: 3 | Status: SHIPPED | OUTPATIENT
Start: 2022-06-01 | End: 2022-07-12 | Stop reason: SDUPTHER

## 2022-06-01 NOTE — TELEPHONE ENCOUNTER
"Rx Refill Note  Requested Prescriptions     Pending Prescriptions Disp Refills   • Syringe 20G X 1-/2\" 3 ML misc 1 each 3     Si each Every 30 (Thirty) Days.      Last office visit with prescribing clinician: 2022      Next office visit with prescribing clinician: 2022   {TIP  Encounters:23}         Barb Martin MA  22, 07:22 CDT  "

## 2022-06-01 NOTE — TELEPHONE ENCOUNTER
Rx Refill Note  Requested Prescriptions     Pending Prescriptions Disp Refills   • cyanocobalamin 1000 MCG/ML injection 10 mL 0     Sig: Inject 1 mL into the appropriate muscle as directed by prescriber Every 28 (Twenty-Eight) Days.      Last office visit with prescribing clinician: 5/19/2022      Next office visit with prescribing clinician: 6/21/2022     Vitamin B12 (05/19/2022 13:00)         Barb Martin MA  06/01/22, 09:11 CDT

## 2022-06-02 ENCOUNTER — LAB (OUTPATIENT)
Dept: INTERNAL MEDICINE | Facility: CLINIC | Age: 42
End: 2022-06-02
Payer: MEDICAID

## 2022-06-03 ENCOUNTER — TELEPHONE (OUTPATIENT)
Dept: INTERNAL MEDICINE | Facility: CLINIC | Age: 42
End: 2022-06-03

## 2022-06-03 NOTE — TELEPHONE ENCOUNTER
Caller: Mark Huddleston    Relationship: Self    Best call back number: [phone]    Caller requesting test results: SELF    What test was performed: LABS     When was the test performed: 06/03/2022    Where was the test performed: OFFICE

## 2022-06-07 ENCOUNTER — TELEPHONE (OUTPATIENT)
Dept: VASCULAR SURGERY | Facility: CLINIC | Age: 42
End: 2022-06-07

## 2022-06-07 NOTE — TELEPHONE ENCOUNTER
Confirmed appt with pt with arrival at 10:30 at the main registration for CT at 11:00.  F/u with Dr Mares at 1:00.

## 2022-06-08 ENCOUNTER — HOSPITAL ENCOUNTER (OUTPATIENT)
Dept: CT IMAGING | Facility: HOSPITAL | Age: 42
Discharge: HOME | End: 2022-06-08
Admitting: SURGERY

## 2022-06-08 ENCOUNTER — APPOINTMENT (OUTPATIENT)
Dept: CT IMAGING | Facility: HOSPITAL | Age: 42
End: 2022-06-08

## 2022-06-08 DIAGNOSIS — I74.8 SUBCLAVIAN ARTERY THROMBOSIS: ICD-10-CM

## 2022-06-08 PROCEDURE — 0 IOPAMIDOL PER 1 ML: Performed by: SURGERY

## 2022-06-08 PROCEDURE — 71275 CT ANGIOGRAPHY CHEST: CPT

## 2022-06-08 RX ADMIN — IOPAMIDOL 86 ML: 755 INJECTION, SOLUTION INTRAVENOUS at 11:11

## 2022-06-13 DIAGNOSIS — D64.9 LOW HEMOGLOBIN: Primary | ICD-10-CM

## 2022-06-15 ENCOUNTER — OFFICE VISIT (OUTPATIENT)
Dept: OBSTETRICS AND GYNECOLOGY | Facility: CLINIC | Age: 42
End: 2022-06-15

## 2022-06-15 VITALS
WEIGHT: 219 LBS | HEIGHT: 69 IN | BODY MASS INDEX: 32.44 KG/M2 | SYSTOLIC BLOOD PRESSURE: 120 MMHG | DIASTOLIC BLOOD PRESSURE: 80 MMHG

## 2022-06-15 DIAGNOSIS — Z12.4 CERVICAL CANCER SCREENING: ICD-10-CM

## 2022-06-15 DIAGNOSIS — N92.0 MENORRHAGIA WITH REGULAR CYCLE: Primary | ICD-10-CM

## 2022-06-15 LAB
B-HCG UR QL: NEGATIVE
EXPIRATION DATE: NORMAL
INTERNAL NEGATIVE CONTROL: NORMAL
INTERNAL POSITIVE CONTROL: NORMAL
Lab: NORMAL

## 2022-06-15 PROCEDURE — 99203 OFFICE O/P NEW LOW 30 MIN: CPT | Performed by: OBSTETRICS & GYNECOLOGY

## 2022-06-15 PROCEDURE — G0123 SCREEN CERV/VAG THIN LAYER: HCPCS | Performed by: OBSTETRICS & GYNECOLOGY

## 2022-06-15 PROCEDURE — 87624 HPV HI-RISK TYP POOLED RSLT: CPT | Performed by: OBSTETRICS & GYNECOLOGY

## 2022-06-15 PROCEDURE — 81025 URINE PREGNANCY TEST: CPT | Performed by: OBSTETRICS & GYNECOLOGY

## 2022-06-15 RX ORDER — ACETAMINOPHEN AND CODEINE PHOSPHATE 120; 12 MG/5ML; MG/5ML
1 SOLUTION ORAL DAILY
Qty: 28 TABLET | Refills: 3 | Status: ON HOLD | OUTPATIENT
Start: 2022-06-15 | End: 2022-11-03

## 2022-06-15 NOTE — PROGRESS NOTES
Subjective   Mark Huddleston is a 42 y.o. female  YOB: 1980    Chief Complaint   Patient presents with   • Menstrual Problem     New patient here today to discuss hysterectomy due to very heavy periods. Patient is on Eliquis due to DVT. Patient has had to have 2 blood transfusions due to low blood counts. Last pap done with [facility] 2 years ago and was normal.       42 year old female  presents with complaints of menorrhagia and dysmenorrhea. She reports that her cycles have gotten heavier since she was placed on Eliquis. She was diagnosed with a deep vein thrombosis on her left upper arm. Her last pap smear was a couple of years ago. Her medical and surgical history are up to date. She smokes 1 ppd. She is sexually active with one partner and she denies complaints.       Allergies   Allergen Reactions   • Penicillins Hives       Past Medical History:   Diagnosis Date   • Anxiety    • Asthma    • Deep vein thrombosis (DVT) of left upper extremity (HCC)    • Depression    • PTSD (post-traumatic stress disorder)        Family History   Problem Relation Age of Onset   • Heart disease Father    • Anxiety disorder Mother    • Depression Mother    • Breast cancer Maternal Grandmother    • Ovarian cancer Neg Hx    • Uterine cancer Neg Hx    • Colon cancer Neg Hx    • Melanoma Neg Hx        Social History     Socioeconomic History   • Marital status:    Tobacco Use   • Smoking status: Current Every Day Smoker     Packs/day: 0.50     Years: 30.00     Pack years: 15.00     Types: Cigarettes   • Smokeless tobacco: Never Used   Substance and Sexual Activity   • Alcohol use: Not Currently   • Drug use: Not Currently   • Sexual activity: Defer         Current Outpatient Medications:   •  albuterol sulfate  (90 Base) MCG/ACT inhaler, Inhale 2 puffs Every 4 (Four) Hours As Needed for Wheezing or Shortness of Air., Disp: 6.7 g, Rfl: 6  •  aspirin 81 MG EC tablet, Take 1 tablet by  "mouth Daily., Disp: 90 tablet, Rfl: 1  •  baclofen (LIORESAL) 10 MG tablet, Take 1 tablet by mouth 3 (Three) Times a Day., Disp: 90 tablet, Rfl: 1  •  buPROPion XL (Wellbutrin XL) 150 MG 24 hr tablet, Take 1 tablet by mouth Daily., Disp: 30 tablet, Rfl: 6  •  busPIRone (BUSPAR) 5 MG tablet, Take 1 tablet by mouth 3 (Three) Times a Day., Disp: 90 tablet, Rfl: 1  •  cyanocobalamin 1000 MCG/ML injection, Inject 1 mL into the appropriate muscle as directed by prescriber Every 28 (Twenty-Eight) Days., Disp: 10 mL, Rfl: 0  •  Eliquis 5 MG tablet tablet, Take 5 mg by mouth Every 12 (Twelve) Hours., Disp: , Rfl:   •  hydrOXYzine (ATARAX) 10 MG tablet, Take 1 tablet by mouth 3 (Three) Times a Day As Needed for Anxiety., Disp: 60 tablet, Rfl: 0  •  Iron, Ferrous Gluconate, 256 (28 Fe) MG tablet, Take 1 tablet by mouth 2 (Two) Times a Day., Disp: 60 tablet, Rfl: 1  •  levocetirizine (XYZAL) 5 MG tablet, TAKE 1 TABLET BY MOUTH ONCE DAILY IN THE EVENING, Disp: 30 tablet, Rfl: 6  •  rosuvastatin (Crestor) 5 MG tablet, Take 1 tablet by mouth Daily., Disp: 90 tablet, Rfl: 1  •  Syringe 20G X 1-1/2\" 3 ML misc, 1 each Every 30 (Thirty) Days., Disp: 1 each, Rfl: 3  •  norethindrone (MICRONOR) 0.35 MG tablet, Take 1 tablet by mouth Daily for 112 days., Disp: 28 tablet, Rfl: 3    No LMP recorded.    Sexual History:         Could not be calculated    Past Surgical History:   Procedure Laterality Date   •  SECTION     • TONSILLECTOMY     • TUBAL ABDOMINAL LIGATION         Review of Systems   Genitourinary: Positive for menstrual problem and pelvic pain.       Objective   Physical Exam  Vitals and nursing note reviewed. Exam conducted with a chaperone present.   Constitutional:       General: She is not in acute distress.     Appearance: She is well-developed.   HENT:      Head: Normocephalic and atraumatic.   Pulmonary:      Effort: Pulmonary effort is normal.   Abdominal:      Palpations: Abdomen is soft.      Tenderness: There " "is no abdominal tenderness.   Genitourinary:     Exam position: Supine.      Labia:         Right: No tenderness or lesion.         Left: No tenderness or lesion.       Vagina: Normal. No signs of injury. No vaginal discharge, tenderness or bleeding.      Cervix: No cervical motion tenderness, discharge or friability.      Uterus: Not enlarged and not tender.       Adnexa:         Right: No tenderness or fullness.          Left: No tenderness or fullness.        Comments: Cervix noted to be anterior       Vitals:    06/15/22 1428   BP: 120/80   Weight: 99.3 kg (219 lb)   Height: 175.3 cm (69\")       Diagnoses and all orders for this visit:    1. Menorrhagia with regular cycle (Primary)  -     POC Pregnancy, Urine    2. Cervical cancer screening  -     Liquid-based Pap Smear, Screening    Other orders  -     norethindrone (MICRONOR) 0.35 MG tablet; Take 1 tablet by mouth Daily for 112 days.  Dispense: 28 tablet; Refill: 3    Discussed with patient different management options for cycle regulation at this time.  Discussed with patient medical versus surgical management.  We will try Micronor at this time. MEC of contraception reviewed and POP is Level 2   Pap smear sent with results to follow.  Return to clinic in 3 months for follow-up or sooner symptoms worsen.  All questions answered patient verbalized understanding of plan.    Ruth Medel, DO       "

## 2022-06-17 LAB
GEN CATEG CVX/VAG CYTO-IMP: NORMAL
HPV I/H RISK 4 DNA CVX QL PROBE+SIG AMP: NOT DETECTED
LAB AP CASE REPORT: NORMAL
LAB AP GYN ADDITIONAL INFORMATION: NORMAL
Lab: NORMAL
PATH INTERP SPEC-IMP: NORMAL
STAT OF ADQ CVX/VAG CYTO-IMP: NORMAL

## 2022-07-12 DIAGNOSIS — Z72.0 TOBACCO ABUSE: ICD-10-CM

## 2022-07-12 DIAGNOSIS — E53.8 B12 DEFICIENCY: ICD-10-CM

## 2022-07-12 RX ORDER — SYRINGE W-NEEDLE,DISPOSAB,3 ML 25GX5/8"
1 SYRINGE, EMPTY DISPOSABLE MISCELLANEOUS
Qty: 1 EACH | Refills: 3 | Status: ON HOLD | OUTPATIENT
Start: 2022-07-12 | End: 2022-11-03

## 2022-07-12 RX ORDER — CYANOCOBALAMIN 1000 UG/ML
1000 INJECTION, SOLUTION INTRAMUSCULAR; SUBCUTANEOUS
Qty: 10 ML | Refills: 0 | Status: SHIPPED | OUTPATIENT
Start: 2022-07-12 | End: 2022-11-04 | Stop reason: SDUPTHER

## 2022-07-12 RX ORDER — ALBUTEROL SULFATE 90 UG/1
2 AEROSOL, METERED RESPIRATORY (INHALATION) EVERY 4 HOURS PRN
Qty: 6.7 G | Refills: 6 | Status: SHIPPED | OUTPATIENT
Start: 2022-07-12 | End: 2023-03-12 | Stop reason: SDUPTHER

## 2022-07-20 ENCOUNTER — TELEPHONE (OUTPATIENT)
Dept: VASCULAR SURGERY | Facility: CLINIC | Age: 42
End: 2022-07-20

## 2022-07-22 ENCOUNTER — TELEPHONE (OUTPATIENT)
Dept: VASCULAR SURGERY | Facility: CLINIC | Age: 42
End: 2022-07-22

## 2022-08-04 ENCOUNTER — DOCUMENTATION (OUTPATIENT)
Dept: CT IMAGING | Facility: HOSPITAL | Age: 42
End: 2022-08-04

## 2022-08-04 NOTE — PROGRESS NOTES
A notification letter was sent to the patient explaining that a recent imaging exam showed an incidental finding, for which follow-up testing was recommended. Routed to PCP

## 2022-08-05 RX ORDER — METHYLPREDNISOLONE 4 MG/1
TABLET ORAL
Qty: 21 TABLET | Refills: 0 | Status: ON HOLD | OUTPATIENT
Start: 2022-08-05 | End: 2022-11-03

## 2022-08-09 ENCOUNTER — TELEPHONE (OUTPATIENT)
Dept: VASCULAR SURGERY | Facility: CLINIC | Age: 42
End: 2022-08-09

## 2022-08-09 DIAGNOSIS — IMO0001 PULMONARY NODULE LESS THAN 6 CM DETERMINED BY COMPUTED TOMOGRAPHY OF LUNG: Primary | ICD-10-CM

## 2022-08-09 NOTE — TELEPHONE ENCOUNTER
Attempted to reschedule cancelled appt from 07/22/22 with Dr. Mares. No answer/ LM for a return call.

## 2022-08-16 DIAGNOSIS — IMO0001 PULMONARY NODULE LESS THAN 6 CM DETERMINED BY COMPUTED TOMOGRAPHY OF LUNG: Primary | ICD-10-CM

## 2022-08-22 ENCOUNTER — APPOINTMENT (OUTPATIENT)
Dept: CT IMAGING | Facility: HOSPITAL | Age: 42
End: 2022-08-22

## 2022-08-26 ENCOUNTER — OFFICE VISIT (OUTPATIENT)
Dept: INTERNAL MEDICINE | Facility: CLINIC | Age: 42
End: 2022-08-26

## 2022-08-26 VITALS
RESPIRATION RATE: 18 BRPM | HEART RATE: 86 BPM | SYSTOLIC BLOOD PRESSURE: 139 MMHG | HEIGHT: 69 IN | TEMPERATURE: 97.6 F | WEIGHT: 220 LBS | OXYGEN SATURATION: 98 % | DIASTOLIC BLOOD PRESSURE: 87 MMHG | BODY MASS INDEX: 32.58 KG/M2

## 2022-08-26 DIAGNOSIS — M79.672 LEFT FOOT PAIN: Primary | ICD-10-CM

## 2022-08-26 PROCEDURE — 99213 OFFICE O/P EST LOW 20 MIN: CPT | Performed by: NURSE PRACTITIONER

## 2022-08-26 PROCEDURE — 96372 THER/PROPH/DIAG INJ SC/IM: CPT | Performed by: NURSE PRACTITIONER

## 2022-08-26 RX ORDER — TRIAMCINOLONE ACETONIDE 40 MG/ML
40 INJECTION, SUSPENSION INTRA-ARTICULAR; INTRAMUSCULAR ONCE
Status: COMPLETED | OUTPATIENT
Start: 2022-08-26 | End: 2022-08-26

## 2022-08-26 RX ADMIN — TRIAMCINOLONE ACETONIDE 40 MG: 40 INJECTION, SUSPENSION INTRA-ARTICULAR; INTRAMUSCULAR at 11:41

## 2022-08-26 NOTE — PROGRESS NOTES
Subjective     Chief Complaint   Patient presents with   • Foot Pain     Left Foot Pain - intermittent. Fast Pace Xrays from 2 weeks ago.       History of Present Illness  The patient presents today due to left foot pain. The patient describes that a few months ago she accidentally stubbed one of her left toes and she had pain and bruising to the area however that had resolved and approximately 3 or 4 weeks ago, she spontaneously began having pain in the foot again. Today, she reports that she can only bear weight on her heel. She describes the pain to be mostly localez at her toes and occasionally it will radiate proximally to her foot. When she had the injury, she had an x-ray of the left foot done but did not get a report of the results.     The patient has a history of blood clots, she is currently taking Eliquis.     Patient's PMR from outside medical facility reviewed and noted.    Review of Systems   Constitutional: Negative for appetite change, chills, diaphoresis, fatigue, fever and unexpected weight change.   HENT: Negative for congestion, ear pain, postnasal drip, sore throat and trouble swallowing.    Eyes: Negative for pain and visual disturbance.   Respiratory: Negative for cough, chest tightness, shortness of breath and wheezing.    Cardiovascular: Negative for chest pain, palpitations and leg swelling.   Gastrointestinal: Negative for abdominal pain, blood in stool, constipation, diarrhea, nausea and vomiting.   Endocrine: Negative for cold intolerance, heat intolerance, polydipsia, polyphagia and polyuria.   Genitourinary: Negative for difficulty urinating, dysuria, frequency and urgency.   Musculoskeletal: Negative for gait problem.   Skin: Negative for rash.   Neurological: Negative for dizziness, seizures, syncope, weakness and headaches.   Hematological: Does not bruise/bleed easily.   Psychiatric/Behavioral: Negative for confusion. The patient is not nervous/anxious.         Otherwise  complete ROS reviewed and negative except as mentioned in the HPI.    Past Medical History:   Past Medical History:   Diagnosis Date   • Anxiety    • Asthma    • Deep vein thrombosis (DVT) of left upper extremity (HCC)    • Depression    • PTSD (post-traumatic stress disorder)      Past Surgical History:  Past Surgical History:   Procedure Laterality Date   •  SECTION     • TONSILLECTOMY     • TUBAL ABDOMINAL LIGATION       Social History:  reports that she has been smoking cigarettes. She has a 15.00 pack-year smoking history. She has never used smokeless tobacco. She reports previous alcohol use. She reports previous drug use.    Family History: family history includes Anxiety disorder in her mother; Breast cancer in her maternal grandmother; Depression in her mother; Heart disease in her father.      Allergies:  Allergies   Allergen Reactions   • Penicillins Hives     Medications:  Prior to Admission medications    Medication Sig Start Date End Date Taking? Authorizing Provider   albuterol sulfate  (90 Base) MCG/ACT inhaler Inhale 2 puffs Every 4 (Four) Hours As Needed for Wheezing or Shortness of Air. 22   Louisa Cook APRN   aspirin 81 MG EC tablet Take 1 tablet by mouth Daily. 10/27/21   Den Agarwal MD   baclofen (LIORESAL) 10 MG tablet Take 1 tablet by mouth 3 (Three) Times a Day. 22   Louisa Cook APRN   buPROPion XL (Wellbutrin XL) 150 MG 24 hr tablet Take 1 tablet by mouth Daily. 22   Louisa Cook APRN   busPIRone (BUSPAR) 5 MG tablet Take 1 tablet by mouth 3 (Three) Times a Day. 22   Louisa Cook APRN   cyanocobalamin 1000 MCG/ML injection Inject 1 mL into the appropriate muscle as directed by prescriber Every 28 (Twenty-Eight) Days. 22   Louisa Cook APRN   Eliquis 5 MG tablet tablet Take 5 mg by mouth Every 12 (Twelve) Hours. 22   ProviderLuis MD   hydrOXYzine (ATARAX) 10 MG tablet Take  "1 tablet by mouth 3 (Three) Times a Day As Needed for Anxiety. 5/4/22   Yuki Iniguez APRN   Iron, Ferrous Gluconate, 256 (28 Fe) MG tablet Take 1 tablet by mouth 2 (Two) Times a Day. 3/14/22   Louisa Cook APRN   levocetirizine (XYZAL) 5 MG tablet TAKE 1 TABLET BY MOUTH ONCE DAILY IN THE EVENING 5/23/22   Louisa Cook APRN   methylPREDNISolone (MEDROL) 4 MG dose pack Take as directed on package instructions. 8/5/22   Louisa Cook APRN   norethindrone (MICRONOR) 0.35 MG tablet Take 1 tablet by mouth Daily for 112 days. 6/15/22 10/5/22  Ruth Medel DO   rosuvastatin (Crestor) 5 MG tablet Take 1 tablet by mouth Daily. 10/26/21   Den Agarwal MD   Syringe 20G X 1-1/2\" 3 ML misc 1 each Every 30 (Thirty) Days. 7/12/22   Louisa Cook APRN       PHQ-9 Depression Screening  Little interest or pleasure in doing things?     Feeling down, depressed, or hopeless?     Trouble falling or staying asleep, or sleeping too much?     Feeling tired or having little energy?     Poor appetite or overeating?     Feeling bad about yourself - or that you are a failure or have let yourself or your family down?     Trouble concentrating on things, such as reading the newspaper or watching television?     Moving or speaking so slowly that other people could have noticed? Or the opposite - being so fidgety or restless that you have been moving around a lot more than usual?     Thoughts that you would be better off dead, or of hurting yourself in some way?     PHQ-9 Total Score     If you checked off any problems, how difficult have these problems made it for you to do your work, take care of things at home, or get along with other people?                Objective     Vital Signs: /87   Pulse 86   Temp 97.6 °F (36.4 °C)   Resp 18   Ht 175.3 cm (69\")   Wt 99.8 kg (220 lb)   SpO2 98%   BMI 32.49 kg/m²   Physical Exam  Constitutional:       Appearance: She is " well-developed.   HENT:      Head: Normocephalic and atraumatic.   Eyes:      General: No scleral icterus.     Conjunctiva/sclera: Conjunctivae normal.      Pupils: Pupils are equal, round, and reactive to light.   Neck:      Vascular: No JVD.   Cardiovascular:      Rate and Rhythm: Normal rate and regular rhythm.      Heart sounds: Normal heart sounds. No murmur heard.    No friction rub. No gallop.   Pulmonary:      Effort: Pulmonary effort is normal.      Breath sounds: Normal breath sounds. No wheezing or rales.   Abdominal:      General: Bowel sounds are normal. There is no distension.      Palpations: Abdomen is soft. There is no mass.      Tenderness: There is no abdominal tenderness. There is no guarding or rebound.   Musculoskeletal:         General: Normal range of motion.      Cervical back: Neck supple.      Comments: Tenderness 2nd, 3rd, 4th MT joint left foot. Mild edema.    Lymphadenopathy:      Cervical: No cervical adenopathy.   Skin:     General: Skin is warm and dry.   Neurological:      Mental Status: She is alert and oriented to person, place, and time.      Cranial Nerves: No cranial nerve deficit.   Psychiatric:         Behavior: Behavior normal.       Results Reviewed:  Glucose   Date Value Ref Range Status   05/19/2022 89 65 - 99 mg/dL Final   11/29/2021 96 65 - 99 mg/dL Final     BUN   Date Value Ref Range Status   05/19/2022 7 6 - 24 mg/dL Final   11/29/2021 10 6 - 20 mg/dL Final     Creatinine   Date Value Ref Range Status   05/19/2022 0.82 0.57 - 1.00 mg/dL Final   11/29/2021 0.70 0.57 - 1.00 mg/dL Final     Sodium   Date Value Ref Range Status   05/19/2022 138 134 - 144 mmol/L Final   11/29/2021 139 136 - 145 mmol/L Final     Potassium   Date Value Ref Range Status   05/19/2022 4.0 3.5 - 5.2 mmol/L Final   11/29/2021 4.3 3.5 - 5.2 mmol/L Final     Chloride   Date Value Ref Range Status   05/19/2022 104 96 - 106 mmol/L Final   11/29/2021 107 98 - 107 mmol/L Final     CO2   Date Value Ref  Range Status   11/29/2021 25.0 22.0 - 29.0 mmol/L Final     Total CO2   Date Value Ref Range Status   05/19/2022 20 20 - 29 mmol/L Final     Calcium   Date Value Ref Range Status   05/19/2022 8.9 8.7 - 10.2 mg/dL Final   11/29/2021 8.7 8.6 - 10.5 mg/dL Final     ALT (SGPT)   Date Value Ref Range Status   05/19/2022 7 0 - 32 IU/L Final   11/29/2021 5 1 - 33 U/L Final     AST (SGOT)   Date Value Ref Range Status   05/19/2022 19 0 - 40 IU/L Final   11/29/2021 11 1 - 32 U/L Final     WBC   Date Value Ref Range Status   06/02/2022 6.8 3.4 - 10.8 x10E3/uL Final     Hematocrit   Date Value Ref Range Status   06/02/2022 30.9 (L) 34.0 - 46.6 % Final   12/08/2021 27.3 (L) 34.0 - 46.6 % Final     Platelets   Date Value Ref Range Status   06/02/2022 324 150 - 450 x10E3/uL Final   12/08/2021 264 140 - 450 10*3/mm3 Final     Total Cholesterol   Date Value Ref Range Status   10/26/2021 174 0 - 200 mg/dL Final     Triglycerides   Date Value Ref Range Status   05/19/2022 47 0 - 149 mg/dL Final   10/26/2021 139 0 - 150 mg/dL Final     HDL Cholesterol   Date Value Ref Range Status   05/19/2022 46 >39 mg/dL Final   10/26/2021 40 40 - 60 mg/dL Final     LDL Chol Calc (NIH)   Date Value Ref Range Status   05/19/2022 74 0 - 99 mg/dL Final     LDL/HDL Ratio   Date Value Ref Range Status   10/26/2021 2.66  Final     Hemoglobin A1C   Date Value Ref Range Status   05/19/2022 5.4 4.8 - 5.6 % Final     Comment:              Prediabetes: 5.7 - 6.4           Diabetes: >6.4           Glycemic control for adults with diabetes: <7.0     10/26/2021 5.50 4.80 - 5.60 % Final         Assessment / Plan     Assessment/Plan:  1. Left foot pain  - XR Foot 3+ View Left  - triamcinolone acetonide (KENALOG-40) injection 40 mg    If pain persists, then will need referral to Ortho.    No follow-ups on file. unless patient needs to be seen sooner or acute issues arise.      I have discussed the patient results/orders and and plan/recommendation with them at  today's visit.      Transcribed from ambient dictation for LILLY Mock by Oma Fernandez.  08/26/22   15:04 CDT    Patient verbalized consent to the visit recording.  I have personally performed the services described in this document as transcribed by the above individual, and it is both accurate and complete.  LILLY Mock  8/26/2022  17:35 CDT

## 2022-08-26 NOTE — PROGRESS NOTES
Subjective     Chief Complaint   Patient presents with   • Foot Pain     Left Foot Pain - intermittent. Fast Pace Xrays from 2 weeks ago.       History of Present Illness    The patient presents today due to left foot pain. The patient describes that a few months ago she accidentally stubbed one of her left toes and she had pain and bruising to the area however that had resolved and approximately 3 or 4 weeks ago, she spontaneously began having pain in the foot again. Today, she reports that she can only bear weight on her heel. She describes the pain to be mostly localez at her toes and occasionally it will radiate proximally to her foot. When she had the injury, she had an x-ray of the left foot done but did not get a report of the results.     The patient has a history of blood clots, she is currently taking Eliquis.     Patient's PMR from outside medical facility reviewed and noted.    Review of Systems     Otherwise complete ROS reviewed and negative except as mentioned in the HPI.    Past Medical History:   Past Medical History:   Diagnosis Date   • Anxiety    • Asthma    • Deep vein thrombosis (DVT) of left upper extremity (HCC)    • Depression    • PTSD (post-traumatic stress disorder)      Past Surgical History:  Past Surgical History:   Procedure Laterality Date   •  SECTION     • TONSILLECTOMY     • TUBAL ABDOMINAL LIGATION       Social History:  reports that she has been smoking cigarettes. She has a 15.00 pack-year smoking history. She has never used smokeless tobacco. She reports previous alcohol use. She reports previous drug use.    Family History: family history includes Anxiety disorder in her mother; Breast cancer in her maternal grandmother; Depression in her mother; Heart disease in her father.      Allergies:  Allergies   Allergen Reactions   • Penicillins Hives     Medications:  Prior to Admission medications    Medication Sig Start Date End Date Taking? Authorizing Provider  "  albuterol sulfate  (90 Base) MCG/ACT inhaler Inhale 2 puffs Every 4 (Four) Hours As Needed for Wheezing or Shortness of Air. 7/12/22   Louisa Cook APRN   aspirin 81 MG EC tablet Take 1 tablet by mouth Daily. 10/27/21   Den Agarwal MD   baclofen (LIORESAL) 10 MG tablet Take 1 tablet by mouth 3 (Three) Times a Day. 5/19/22   Louisa Cook APRN   buPROPion XL (Wellbutrin XL) 150 MG 24 hr tablet Take 1 tablet by mouth Daily. 5/19/22   Louisa Cook APRN   busPIRone (BUSPAR) 5 MG tablet Take 1 tablet by mouth 3 (Three) Times a Day. 5/19/22   Louisa Cook APRN   cyanocobalamin 1000 MCG/ML injection Inject 1 mL into the appropriate muscle as directed by prescriber Every 28 (Twenty-Eight) Days. 7/12/22   Louisa Cook APRN   Eliquis 5 MG tablet tablet Take 5 mg by mouth Every 12 (Twelve) Hours. 2/21/22   Provider, MD Luis   hydrOXYzine (ATARAX) 10 MG tablet Take 1 tablet by mouth 3 (Three) Times a Day As Needed for Anxiety. 5/4/22   Yuki Iniguez APRN   Iron, Ferrous Gluconate, 256 (28 Fe) MG tablet Take 1 tablet by mouth 2 (Two) Times a Day. 3/14/22   Louisa Cook APRN   levocetirizine (XYZAL) 5 MG tablet TAKE 1 TABLET BY MOUTH ONCE DAILY IN THE EVENING 5/23/22   Louisa Cook APRN   methylPREDNISolone (MEDROL) 4 MG dose pack Take as directed on package instructions. 8/5/22   Louisa Cook APRN   norethindrone (MICRONOR) 0.35 MG tablet Take 1 tablet by mouth Daily for 112 days. 6/15/22 10/5/22  Ruth Medel DO   rosuvastatin (Crestor) 5 MG tablet Take 1 tablet by mouth Daily. 10/26/21   Den Agarwal MD   Syringe 20G X 1-1/2\" 3 ML misc 1 each Every 30 (Thirty) Days. 7/12/22   Louisa Cook APRN       PHQ-9 Depression Screening  Little interest or pleasure in doing things?     Feeling down, depressed, or hopeless?     Trouble falling or staying asleep, or sleeping too much?     Feeling " "tired or having little energy?     Poor appetite or overeating?     Feeling bad about yourself - or that you are a failure or have let yourself or your family down?     Trouble concentrating on things, such as reading the newspaper or watching television?     Moving or speaking so slowly that other people could have noticed? Or the opposite - being so fidgety or restless that you have been moving around a lot more than usual?     Thoughts that you would be better off dead, or of hurting yourself in some way?     PHQ-9 Total Score     If you checked off any problems, how difficult have these problems made it for you to do your work, take care of things at home, or get along with other people?                Objective     Vital Signs: /87   Pulse 86   Temp 97.6 °F (36.4 °C)   Resp 18   Ht 175.3 cm (69\")   Wt 99.8 kg (220 lb)   SpO2 98%   BMI 32.49 kg/m²   Physical Exam  Constitutional:       Appearance: She is well-developed.   HENT:      Head: Normocephalic and atraumatic.   Eyes:      Conjunctiva/sclera: Conjunctivae normal.      Pupils: Pupils are equal, round, and reactive to light.   Neck:      Vascular: No JVD.   Cardiovascular:      Rate and Rhythm: Normal rate and regular rhythm.      Heart sounds: Normal heart sounds. No murmur heard.    No friction rub. No gallop.   Pulmonary:      Effort: Pulmonary effort is normal. No respiratory distress.      Breath sounds: Normal breath sounds. No wheezing or rales.   Chest:      Chest wall: No tenderness.   Abdominal:      General: Bowel sounds are normal. There is no distension.      Palpations: Abdomen is soft.      Tenderness: There is no abdominal tenderness. There is no guarding or rebound.   Musculoskeletal:         General: No tenderness or deformity. Normal range of motion.      Cervical back: Neck supple.   Skin:     General: Skin is warm and dry.      Findings: No rash.   Neurological:      Mental Status: She is alert and oriented to person, " place, and time.      Cranial Nerves: No cranial nerve deficit.      Motor: No abnormal muscle tone.      Deep Tendon Reflexes: Reflexes normal.   Psychiatric:         Behavior: Behavior normal.         Thought Content: Thought content normal.         Judgment: Judgment normal.         {BMI is >= 30 and <35. (Class 1 Obesity). The following options were offered after discussion;:[id number]}      Results Reviewed:  Glucose   Date Value Ref Range Status   05/19/2022 89 65 - 99 mg/dL Final   11/29/2021 96 65 - 99 mg/dL Final     BUN   Date Value Ref Range Status   05/19/2022 7 6 - 24 mg/dL Final   11/29/2021 10 6 - 20 mg/dL Final     Creatinine   Date Value Ref Range Status   05/19/2022 0.82 0.57 - 1.00 mg/dL Final   11/29/2021 0.70 0.57 - 1.00 mg/dL Final     Sodium   Date Value Ref Range Status   05/19/2022 138 134 - 144 mmol/L Final   11/29/2021 139 136 - 145 mmol/L Final     Potassium   Date Value Ref Range Status   05/19/2022 4.0 3.5 - 5.2 mmol/L Final   11/29/2021 4.3 3.5 - 5.2 mmol/L Final     Chloride   Date Value Ref Range Status   05/19/2022 104 96 - 106 mmol/L Final   11/29/2021 107 98 - 107 mmol/L Final     CO2   Date Value Ref Range Status   11/29/2021 25.0 22.0 - 29.0 mmol/L Final     Total CO2   Date Value Ref Range Status   05/19/2022 20 20 - 29 mmol/L Final     Calcium   Date Value Ref Range Status   05/19/2022 8.9 8.7 - 10.2 mg/dL Final   11/29/2021 8.7 8.6 - 10.5 mg/dL Final     ALT (SGPT)   Date Value Ref Range Status   05/19/2022 7 0 - 32 IU/L Final   11/29/2021 5 1 - 33 U/L Final     AST (SGOT)   Date Value Ref Range Status   05/19/2022 19 0 - 40 IU/L Final   11/29/2021 11 1 - 32 U/L Final     WBC   Date Value Ref Range Status   06/02/2022 6.8 3.4 - 10.8 x10E3/uL Final     Hematocrit   Date Value Ref Range Status   06/02/2022 30.9 (L) 34.0 - 46.6 % Final   12/08/2021 27.3 (L) 34.0 - 46.6 % Final     Platelets   Date Value Ref Range Status   06/02/2022 324 150 - 450 x10E3/uL Final   12/08/2021 264  "140 - 450 10*3/mm3 Final     Total Cholesterol   Date Value Ref Range Status   10/26/2021 174 0 - 200 mg/dL Final     Triglycerides   Date Value Ref Range Status   05/19/2022 47 0 - 149 mg/dL Final   10/26/2021 139 0 - 150 mg/dL Final     HDL Cholesterol   Date Value Ref Range Status   05/19/2022 46 >39 mg/dL Final   10/26/2021 40 40 - 60 mg/dL Final     LDL Chol Calc (NIH)   Date Value Ref Range Status   05/19/2022 74 0 - 99 mg/dL Final     LDL/HDL Ratio   Date Value Ref Range Status   10/26/2021 2.66  Final     Hemoglobin A1C   Date Value Ref Range Status   05/19/2022 5.4 4.8 - 5.6 % Final     Comment:              Prediabetes: 5.7 - 6.4           Diabetes: >6.4           Glycemic control for adults with diabetes: <7.0     10/26/2021 5.50 4.80 - 5.60 % Final         Assessment / Plan     Assessment/Plan:  1. Left foot pain  ***  - XR Foot 3+ View Left  - triamcinolone acetonide (KENALOG-40) injection 40 mg        No follow-ups on file. unless patient needs to be seen sooner or acute issues arise.      I have discussed the patient results/orders and and plan/recommendation with them at today's visit.      Transcribed from ambient dictation for LILLY Mock by Oma Fernandez.  08/26/22   15:02 CDT    {CARLE[facility] Provider Statement:[id number]::\"Patient verbalized consent to the visit recording.\"}        "

## 2022-09-15 ENCOUNTER — OFFICE VISIT (OUTPATIENT)
Dept: INTERNAL MEDICINE | Facility: CLINIC | Age: 42
End: 2022-09-15

## 2022-09-15 VITALS
RESPIRATION RATE: 18 BRPM | OXYGEN SATURATION: 100 % | HEIGHT: 69 IN | SYSTOLIC BLOOD PRESSURE: 115 MMHG | WEIGHT: 227 LBS | DIASTOLIC BLOOD PRESSURE: 73 MMHG | TEMPERATURE: 98.3 F | HEART RATE: 87 BPM | BODY MASS INDEX: 33.62 KG/M2

## 2022-09-15 DIAGNOSIS — F33.1 MODERATE EPISODE OF RECURRENT MAJOR DEPRESSIVE DISORDER: ICD-10-CM

## 2022-09-15 DIAGNOSIS — M79.672 LEFT FOOT PAIN: Primary | ICD-10-CM

## 2022-09-15 PROCEDURE — 99213 OFFICE O/P EST LOW 20 MIN: CPT | Performed by: NURSE PRACTITIONER

## 2022-09-15 RX ORDER — BUPROPION HYDROCHLORIDE 300 MG/1
300 TABLET ORAL DAILY
Qty: 90 TABLET | Refills: 1 | Status: SHIPPED | OUTPATIENT
Start: 2022-09-15 | End: 2023-03-12 | Stop reason: SDUPTHER
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
## 2022-09-27 RX ORDER — DESVENLAFAXINE SUCCINATE 50 MG/1
50 TABLET, EXTENDED RELEASE ORAL DAILY
Qty: 30 TABLET | Refills: 1 | Status: SHIPPED | OUTPATIENT
Start: 2022-09-27 | End: 2023-02-27

## 2022-11-02 ENCOUNTER — APPOINTMENT (OUTPATIENT)
Dept: CARDIOLOGY | Facility: HOSPITAL | Age: 42
End: 2022-11-02

## 2022-11-02 ENCOUNTER — HOSPITAL ENCOUNTER (INPATIENT)
Facility: HOSPITAL | Age: 42
LOS: 2 days | Discharge: HOME | End: 2022-11-04
Attending: EMERGENCY MEDICINE | Admitting: EMERGENCY MEDICINE

## 2022-11-02 DIAGNOSIS — I21.02 ST ELEVATION MYOCARDIAL INFARCTION INVOLVING LEFT ANTERIOR DESCENDING (LAD) CORONARY ARTERY: Primary | ICD-10-CM

## 2022-11-02 PROBLEM — Z72.0 NICOTINE ABUSE: Status: ACTIVE | Noted: 2022-11-02

## 2022-11-02 PROBLEM — I21.3 STEMI (ST ELEVATION MYOCARDIAL INFARCTION): Status: ACTIVE | Noted: 2022-11-02

## 2022-11-02 PROBLEM — I21.11 ST ELEVATION MYOCARDIAL INFARCTION INVOLVING RIGHT CORONARY ARTERY (HCC): Status: ACTIVE | Noted: 2022-11-02

## 2022-11-02 LAB
ACT BLD: 196 SECONDS (ref 82–152)
APTT PPP: 76.9 SECONDS (ref 24.1–35)
APTT PPP: 77.7 SECONDS (ref 24.1–35)
APTT PPP: >200 SECONDS (ref 24.1–35)
BASOPHILS # BLD AUTO: 0.09 10*3/MM3 (ref 0–0.2)
BASOPHILS NFR BLD AUTO: 0.8 % (ref 0–1.5)
BH CV ECHO MEAS - AO MAX PG: 15.7 MMHG
BH CV ECHO MEAS - AO MEAN PG: 8 MMHG
BH CV ECHO MEAS - AO ROOT DIAM: 3.5 CM
BH CV ECHO MEAS - AO V2 MAX: 198 CM/SEC
BH CV ECHO MEAS - AO V2 VTI: 43.5 CM
BH CV ECHO MEAS - AVA(I,D): 2.48 CM2
BH CV ECHO MEAS - EDV(CUBED): 107.9 ML
BH CV ECHO MEAS - EDV(MOD-SP4): 127 ML
BH CV ECHO MEAS - EF(MOD-SP4): 61.7 %
BH CV ECHO MEAS - ESV(CUBED): 25.4 ML
BH CV ECHO MEAS - ESV(MOD-SP4): 48.6 ML
BH CV ECHO MEAS - FS: 38.2 %
BH CV ECHO MEAS - IVS/LVPW: 1.18 CM
BH CV ECHO MEAS - IVSD: 1.06 CM
BH CV ECHO MEAS - LA DIMENSION: 3.4 CM
BH CV ECHO MEAS - LAT PEAK E' VEL: 7.9 CM/SEC
BH CV ECHO MEAS - LV DIASTOLIC VOL/BSA (35-75): 62.9 CM2
BH CV ECHO MEAS - LV MASS(C)D: 163.4 GRAMS
BH CV ECHO MEAS - LV MAX PG: 6 MMHG
BH CV ECHO MEAS - LV MEAN PG: 3 MMHG
BH CV ECHO MEAS - LV SYSTOLIC VOL/BSA (12-30): 24.1 CM2
BH CV ECHO MEAS - LV V1 MAX: 122 CM/SEC
BH CV ECHO MEAS - LV V1 VTI: 34.4 CM
BH CV ECHO MEAS - LVIDD: 4.8 CM
BH CV ECHO MEAS - LVIDS: 2.9 CM
BH CV ECHO MEAS - LVOT AREA: 3.1 CM2
BH CV ECHO MEAS - LVOT DIAM: 2 CM
BH CV ECHO MEAS - LVPWD: 0.9 CM
BH CV ECHO MEAS - MED PEAK E' VEL: 5.8 CM/SEC
BH CV ECHO MEAS - MV A MAX VEL: 82 CM/SEC
BH CV ECHO MEAS - MV DEC SLOPE: 343 CM/SEC2
BH CV ECHO MEAS - MV DEC TIME: 0.19 MSEC
BH CV ECHO MEAS - MV E MAX VEL: 107 CM/SEC
BH CV ECHO MEAS - MV E/A: 1.3
BH CV ECHO MEAS - MV P1/2T: 90.5 MSEC
BH CV ECHO MEAS - MVA(P1/2T): 2.43 CM2
BH CV ECHO MEAS - PA V2 MAX: 134 CM/SEC
BH CV ECHO MEAS - RAP SYSTOLE: 5 MMHG
BH CV ECHO MEAS - RVSP: 21.6 MMHG
BH CV ECHO MEAS - SI(MOD-SP4): 38.8 ML/M2
BH CV ECHO MEAS - SV(LVOT): 108.1 ML
BH CV ECHO MEAS - SV(MOD-SP4): 78.4 ML
BH CV ECHO MEAS - TR MAX PG: 16.6 MMHG
BH CV ECHO MEAS - TR MAX VEL: 204 CM/SEC
BH CV ECHO MEASUREMENTS AVERAGE E/E' RATIO: 15.62
DEPRECATED RDW RBC AUTO: 49.4 FL (ref 37–54)
EOSINOPHIL # BLD AUTO: 0.29 10*3/MM3 (ref 0–0.4)
EOSINOPHIL NFR BLD AUTO: 2.6 % (ref 0.3–6.2)
ERYTHROCYTE [DISTWIDTH] IN BLOOD BY AUTOMATED COUNT: 18.3 % (ref 12.3–15.4)
HCT VFR BLD AUTO: 32.1 % (ref 34–46.6)
HGB BLD-MCNC: 8.9 G/DL (ref 12–15.9)
IMM GRANULOCYTES # BLD AUTO: 0.06 10*3/MM3 (ref 0–0.05)
IMM GRANULOCYTES NFR BLD AUTO: 0.5 % (ref 0–0.5)
INR PPP: 1.24 (ref 0.91–1.09)
LEFT ATRIUM VOLUME INDEX: 27.7 ML/M2
LEFT ATRIUM VOLUME: 58 ML
LYMPHOCYTES # BLD AUTO: 1.97 10*3/MM3 (ref 0.7–3.1)
LYMPHOCYTES NFR BLD AUTO: 17.8 % (ref 19.6–45.3)
MAXIMAL PREDICTED HEART RATE: 178 BPM
MCH RBC QN AUTO: 20.9 PG (ref 26.6–33)
MCHC RBC AUTO-ENTMCNC: 27.7 G/DL (ref 31.5–35.7)
MCV RBC AUTO: 75.4 FL (ref 79–97)
MONOCYTES # BLD AUTO: 0.85 10*3/MM3 (ref 0.1–0.9)
MONOCYTES NFR BLD AUTO: 7.7 % (ref 5–12)
NEUTROPHILS NFR BLD AUTO: 7.81 10*3/MM3 (ref 1.7–7)
NEUTROPHILS NFR BLD AUTO: 70.6 % (ref 42.7–76)
NRBC BLD AUTO-RTO: 0 /100 WBC (ref 0–0.2)
PLATELET # BLD AUTO: 312 10*3/MM3 (ref 140–450)
PMV BLD AUTO: 10.3 FL (ref 6–12)
PROTHROMBIN TIME: 15.1 SECONDS (ref 11.9–14.6)
RBC # BLD AUTO: 4.26 10*6/MM3 (ref 3.77–5.28)
STRESS TARGET HR: 151 BPM
WBC NRBC COR # BLD: 11.07 10*3/MM3 (ref 3.4–10.8)

## 2022-11-02 PROCEDURE — C1769 GUIDE WIRE: HCPCS | Performed by: EMERGENCY MEDICINE

## 2022-11-02 PROCEDURE — 25010000002 MIDAZOLAM PER 1 MG: Performed by: EMERGENCY MEDICINE

## 2022-11-02 PROCEDURE — 25010000002 HEPARIN (PORCINE) 25000-0.45 UT/250ML-% SOLUTION: Performed by: EMERGENCY MEDICINE

## 2022-11-02 PROCEDURE — 99152 MOD SED SAME PHYS/QHP 5/>YRS: CPT | Performed by: EMERGENCY MEDICINE

## 2022-11-02 PROCEDURE — 25010000002 ONDANSETRON PER 1 MG: Performed by: EMERGENCY MEDICINE

## 2022-11-02 PROCEDURE — C1894 INTRO/SHEATH, NON-LASER: HCPCS | Performed by: EMERGENCY MEDICINE

## 2022-11-02 PROCEDURE — 25010000002 HEPARIN (PORCINE) PER 1000 UNITS: Performed by: EMERGENCY MEDICINE

## 2022-11-02 PROCEDURE — 85347 COAGULATION TIME ACTIVATED: CPT

## 2022-11-02 PROCEDURE — 25010000002 HEPARIN (PORCINE) 1000-0.9 UT/500ML-% SOLUTION: Performed by: EMERGENCY MEDICINE

## 2022-11-02 PROCEDURE — 4A023N7 MEASUREMENT OF CARDIAC SAMPLING AND PRESSURE, LEFT HEART, PERCUTANEOUS APPROACH: ICD-10-PCS | Performed by: EMERGENCY MEDICINE

## 2022-11-02 PROCEDURE — 93458 L HRT ARTERY/VENTRICLE ANGIO: CPT | Performed by: EMERGENCY MEDICINE

## 2022-11-02 PROCEDURE — 99153 MOD SED SAME PHYS/QHP EA: CPT | Performed by: EMERGENCY MEDICINE

## 2022-11-02 PROCEDURE — C1760 CLOSURE DEV, VASC: HCPCS | Performed by: EMERGENCY MEDICINE

## 2022-11-02 PROCEDURE — C1887 CATHETER, GUIDING: HCPCS | Performed by: EMERGENCY MEDICINE

## 2022-11-02 PROCEDURE — 93005 ELECTROCARDIOGRAM TRACING: CPT | Performed by: EMERGENCY MEDICINE

## 2022-11-02 PROCEDURE — C1725 CATH, TRANSLUMIN NON-LASER: HCPCS | Performed by: EMERGENCY MEDICINE

## 2022-11-02 PROCEDURE — 93306 TTE W/DOPPLER COMPLETE: CPT

## 2022-11-02 PROCEDURE — 93306 TTE W/DOPPLER COMPLETE: CPT | Performed by: EMERGENCY MEDICINE

## 2022-11-02 PROCEDURE — 36415 COLL VENOUS BLD VENIPUNCTURE: CPT | Performed by: EMERGENCY MEDICINE

## 2022-11-02 PROCEDURE — 92941 PRQ TRLML REVSC TOT OCCL AMI: CPT | Performed by: EMERGENCY MEDICINE

## 2022-11-02 PROCEDURE — 36415 COLL VENOUS BLD VENIPUNCTURE: CPT

## 2022-11-02 PROCEDURE — B2111ZZ FLUOROSCOPY OF MULTIPLE CORONARY ARTERIES USING LOW OSMOLAR CONTRAST: ICD-10-PCS | Performed by: EMERGENCY MEDICINE

## 2022-11-02 PROCEDURE — 99223 1ST HOSP IP/OBS HIGH 75: CPT | Performed by: EMERGENCY MEDICINE

## 2022-11-02 PROCEDURE — 25010000002 HEPARIN (PORCINE) 2000-0.9 UNIT/L-% SOLUTION: Performed by: EMERGENCY MEDICINE

## 2022-11-02 PROCEDURE — 93010 ELECTROCARDIOGRAM REPORT: CPT | Performed by: INTERNAL MEDICINE

## 2022-11-02 PROCEDURE — 25010000002 PERFLUTREN 6.52 MG/ML SUSPENSION: Performed by: EMERGENCY MEDICINE

## 2022-11-02 PROCEDURE — 85730 THROMBOPLASTIN TIME PARTIAL: CPT | Performed by: EMERGENCY MEDICINE

## 2022-11-02 PROCEDURE — 25010000002 FENTANYL CITRATE (PF) 50 MCG/ML SOLUTION: Performed by: EMERGENCY MEDICINE

## 2022-11-02 PROCEDURE — 85025 COMPLETE CBC W/AUTO DIFF WBC: CPT | Performed by: EMERGENCY MEDICINE

## 2022-11-02 PROCEDURE — 0 IOPAMIDOL PER 1 ML: Performed by: EMERGENCY MEDICINE

## 2022-11-02 PROCEDURE — 02703ZZ DILATION OF CORONARY ARTERY, ONE ARTERY, PERCUTANEOUS APPROACH: ICD-10-PCS | Performed by: EMERGENCY MEDICINE

## 2022-11-02 PROCEDURE — 85610 PROTHROMBIN TIME: CPT | Performed by: EMERGENCY MEDICINE

## 2022-11-02 PROCEDURE — 99284 EMERGENCY DEPT VISIT MOD MDM: CPT

## 2022-11-02 PROCEDURE — B2151ZZ FLUOROSCOPY OF LEFT HEART USING LOW OSMOLAR CONTRAST: ICD-10-PCS | Performed by: EMERGENCY MEDICINE

## 2022-11-02 RX ORDER — HEPARIN SODIUM 1000 [USP'U]/ML
2000 INJECTION, SOLUTION INTRAVENOUS; SUBCUTANEOUS AS NEEDED
Status: DISCONTINUED | OUTPATIENT
Start: 2022-11-02 | End: 2022-11-02

## 2022-11-02 RX ORDER — HEPARIN SODIUM 200 [USP'U]/100ML
INJECTION, SOLUTION INTRAVENOUS
Status: DISCONTINUED | OUTPATIENT
Start: 2022-11-02 | End: 2022-11-02 | Stop reason: HOSPADM

## 2022-11-02 RX ORDER — BACLOFEN 10 MG/1
10 TABLET ORAL 3 TIMES DAILY
Status: DISCONTINUED | OUTPATIENT
Start: 2022-11-02 | End: 2022-11-04 | Stop reason: HOSPADM

## 2022-11-02 RX ORDER — BUPROPION HYDROCHLORIDE 150 MG/1
300 TABLET ORAL DAILY
Status: DISCONTINUED | OUTPATIENT
Start: 2022-11-02 | End: 2022-11-04 | Stop reason: HOSPADM

## 2022-11-02 RX ORDER — ONDANSETRON 2 MG/ML
INJECTION INTRAMUSCULAR; INTRAVENOUS
Status: DISCONTINUED | OUTPATIENT
Start: 2022-11-02 | End: 2022-11-02 | Stop reason: HOSPADM

## 2022-11-02 RX ORDER — HEPARIN SODIUM 5000 [USP'U]/ML
INJECTION, SOLUTION INTRAVENOUS; SUBCUTANEOUS
Status: DISCONTINUED | OUTPATIENT
Start: 2022-11-02 | End: 2022-11-02 | Stop reason: HOSPADM

## 2022-11-02 RX ORDER — ACETAMINOPHEN 325 MG/1
650 TABLET ORAL EVERY 4 HOURS PRN
Status: DISCONTINUED | OUTPATIENT
Start: 2022-11-02 | End: 2022-11-04 | Stop reason: HOSPADM

## 2022-11-02 RX ORDER — BUSPIRONE HYDROCHLORIDE 5 MG/1
5 TABLET ORAL 3 TIMES DAILY
Status: DISCONTINUED | OUTPATIENT
Start: 2022-11-02 | End: 2022-11-04 | Stop reason: HOSPADM

## 2022-11-02 RX ORDER — SODIUM CHLORIDE 9 MG/ML
100 INJECTION, SOLUTION INTRAVENOUS CONTINUOUS
Status: DISCONTINUED | OUTPATIENT
Start: 2022-11-02 | End: 2022-11-04 | Stop reason: HOSPADM

## 2022-11-02 RX ORDER — CYANOCOBALAMIN 1000 UG/ML
1000 INJECTION, SOLUTION INTRAMUSCULAR; SUBCUTANEOUS
Status: DISCONTINUED | OUTPATIENT
Start: 2022-11-02 | End: 2022-11-02

## 2022-11-02 RX ORDER — HEPARIN SODIUM 10000 [USP'U]/100ML
11.9 INJECTION, SOLUTION INTRAVENOUS
Status: DISCONTINUED | OUTPATIENT
Start: 2022-11-02 | End: 2022-11-02

## 2022-11-02 RX ORDER — HEPARIN SODIUM 1000 [USP'U]/ML
4000 INJECTION, SOLUTION INTRAVENOUS; SUBCUTANEOUS ONCE
Status: COMPLETED | OUTPATIENT
Start: 2022-11-02 | End: 2022-11-02

## 2022-11-02 RX ORDER — ONDANSETRON 2 MG/ML
4 INJECTION INTRAMUSCULAR; INTRAVENOUS EVERY 6 HOURS PRN
Status: DISCONTINUED | OUTPATIENT
Start: 2022-11-02 | End: 2022-11-04 | Stop reason: HOSPADM

## 2022-11-02 RX ORDER — ALBUTEROL SULFATE 90 UG/1
2 AEROSOL, METERED RESPIRATORY (INHALATION) EVERY 4 HOURS PRN
Status: DISCONTINUED | OUTPATIENT
Start: 2022-11-02 | End: 2022-11-04 | Stop reason: HOSPADM

## 2022-11-02 RX ORDER — FENTANYL CITRATE 50 UG/ML
INJECTION, SOLUTION INTRAMUSCULAR; INTRAVENOUS
Status: DISCONTINUED | OUTPATIENT
Start: 2022-11-02 | End: 2022-11-02 | Stop reason: HOSPADM

## 2022-11-02 RX ORDER — MIDAZOLAM HYDROCHLORIDE 1 MG/ML
INJECTION INTRAMUSCULAR; INTRAVENOUS
Status: DISCONTINUED | OUTPATIENT
Start: 2022-11-02 | End: 2022-11-02 | Stop reason: HOSPADM

## 2022-11-02 RX ORDER — CLOPIDOGREL BISULFATE 75 MG/1
75 TABLET ORAL DAILY
Status: DISCONTINUED | OUTPATIENT
Start: 2022-11-03 | End: 2022-11-04 | Stop reason: HOSPADM

## 2022-11-02 RX ORDER — CHLORHEXIDINE GLUCONATE 500 MG/1
1 CLOTH TOPICAL ONCE
Status: COMPLETED | OUTPATIENT
Start: 2022-11-02 | End: 2022-11-02

## 2022-11-02 RX ORDER — NITROGLYCERIN 20 MG/100ML
10-50 INJECTION INTRAVENOUS
Status: DISCONTINUED | OUTPATIENT
Start: 2022-11-02 | End: 2022-11-04 | Stop reason: HOSPADM

## 2022-11-02 RX ORDER — ROSUVASTATIN CALCIUM 20 MG/1
20 TABLET, COATED ORAL DAILY
Status: DISCONTINUED | OUTPATIENT
Start: 2022-11-02 | End: 2022-11-04 | Stop reason: HOSPADM

## 2022-11-02 RX ORDER — ONDANSETRON 4 MG/1
4 TABLET, FILM COATED ORAL EVERY 6 HOURS PRN
Status: DISCONTINUED | OUTPATIENT
Start: 2022-11-02 | End: 2022-11-04 | Stop reason: HOSPADM

## 2022-11-02 RX ORDER — ASPIRIN 81 MG/1
81 TABLET ORAL DAILY
Status: DISCONTINUED | OUTPATIENT
Start: 2022-11-02 | End: 2022-11-04 | Stop reason: HOSPADM

## 2022-11-02 RX ORDER — HYDROXYZINE HYDROCHLORIDE 10 MG/1
10 TABLET, FILM COATED ORAL 3 TIMES DAILY PRN
Status: DISCONTINUED | OUTPATIENT
Start: 2022-11-02 | End: 2022-11-04 | Stop reason: HOSPADM

## 2022-11-02 RX ORDER — LIDOCAINE HYDROCHLORIDE 20 MG/ML
INJECTION, SOLUTION INFILTRATION; PERINEURAL
Status: DISCONTINUED | OUTPATIENT
Start: 2022-11-02 | End: 2022-11-02 | Stop reason: HOSPADM

## 2022-11-02 RX ORDER — DESVENLAFAXINE SUCCINATE 50 MG/1
50 TABLET, EXTENDED RELEASE ORAL DAILY
Status: DISCONTINUED | OUTPATIENT
Start: 2022-11-02 | End: 2022-11-04 | Stop reason: HOSPADM

## 2022-11-02 RX ORDER — CLOPIDOGREL BISULFATE 75 MG/1
TABLET ORAL
Status: DISCONTINUED | OUTPATIENT
Start: 2022-11-02 | End: 2022-11-02 | Stop reason: HOSPADM

## 2022-11-02 RX ORDER — NITROGLYCERIN 20 MG/100ML
INJECTION INTRAVENOUS
Status: COMPLETED | OUTPATIENT
Start: 2022-11-02 | End: 2022-11-02

## 2022-11-02 RX ORDER — CHLORHEXIDINE GLUCONATE 500 MG/1
1 CLOTH TOPICAL EVERY 24 HOURS
Status: DISCONTINUED | OUTPATIENT
Start: 2022-11-03 | End: 2022-11-04 | Stop reason: HOSPADM

## 2022-11-02 RX ORDER — HEPARIN SODIUM 1000 [USP'U]/ML
4000 INJECTION, SOLUTION INTRAVENOUS; SUBCUTANEOUS AS NEEDED
Status: DISCONTINUED | OUTPATIENT
Start: 2022-11-02 | End: 2022-11-02

## 2022-11-02 RX ORDER — NITROGLYCERIN 20 MG/100ML
5-200 INJECTION INTRAVENOUS
Status: DISCONTINUED | OUTPATIENT
Start: 2022-11-02 | End: 2022-11-02

## 2022-11-02 RX ADMIN — APIXABAN 5 MG: 5 TABLET, FILM COATED ORAL at 20:24

## 2022-11-02 RX ADMIN — BUSPIRONE HYDROCHLORIDE 5 MG: 5 TABLET ORAL at 20:24

## 2022-11-02 RX ADMIN — HEPARIN SODIUM 11.9 UNITS/KG/HR: 10000 INJECTION, SOLUTION INTRAVENOUS at 13:18

## 2022-11-02 RX ADMIN — ACETAMINOPHEN 650 MG: 325 TABLET, FILM COATED ORAL at 10:38

## 2022-11-02 RX ADMIN — CHLORHEXIDINE GLUCONATE 1 APPLICATION: 500 CLOTH TOPICAL at 16:24

## 2022-11-02 RX ADMIN — SODIUM CHLORIDE 100 ML/HR: 9 INJECTION, SOLUTION INTRAVENOUS at 13:18

## 2022-11-02 RX ADMIN — BACLOFEN 10 MG: 10 TABLET ORAL at 10:39

## 2022-11-02 RX ADMIN — BACLOFEN 10 MG: 10 TABLET ORAL at 16:30

## 2022-11-02 RX ADMIN — SODIUM CHLORIDE 100 ML/HR: 9 INJECTION, SOLUTION INTRAVENOUS at 23:34

## 2022-11-02 RX ADMIN — HEPARIN SODIUM 4000 UNITS: 1000 INJECTION, SOLUTION INTRAVENOUS; SUBCUTANEOUS at 13:17

## 2022-11-02 RX ADMIN — BUSPIRONE HYDROCHLORIDE 5 MG: 5 TABLET ORAL at 10:39

## 2022-11-02 RX ADMIN — BUPROPION HYDROCHLORIDE 300 MG: 150 TABLET, FILM COATED, EXTENDED RELEASE ORAL at 10:39

## 2022-11-02 RX ADMIN — DESVENLAFAXINE SUCCINATE 50 MG: 50 TABLET, EXTENDED RELEASE ORAL at 10:39

## 2022-11-02 RX ADMIN — PERFLUTREN 8.48 MG: 6.52 INJECTION, SUSPENSION INTRAVENOUS at 15:17

## 2022-11-02 RX ADMIN — ASPIRIN 81 MG: 81 TABLET, COATED ORAL at 11:37

## 2022-11-02 RX ADMIN — ACETAMINOPHEN 650 MG: 325 TABLET, FILM COATED ORAL at 17:26

## 2022-11-02 RX ADMIN — ROSUVASTATIN CALCIUM 20 MG: 20 TABLET, FILM COATED ORAL at 10:40

## 2022-11-02 RX ADMIN — BACLOFEN 10 MG: 10 TABLET ORAL at 20:24

## 2022-11-02 RX ADMIN — ACETAMINOPHEN 650 MG: 325 TABLET, FILM COATED ORAL at 21:36

## 2022-11-02 RX ADMIN — BUSPIRONE HYDROCHLORIDE 5 MG: 5 TABLET ORAL at 16:29

## 2022-11-02 RX ADMIN — Medication 1 APPLICATION: at 16:30

## 2022-11-02 NOTE — H&P
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  She has been having chest pain on and off for the past 2 days.  Patient is already established with vascular surgery, Dr. Mares who has been following her for the past year.  Records indicate that she also has a history of posttraumatic stress disorder, drug abuse and depression.    Echocardiogram from October of last year showed normal systolic function.    Review of Systems   Constitutional: Negative for fatigue.   HENT: Negative for trouble swallowing.    Eyes: Negative for pain.   Respiratory: Positive for chest tightness. Negative for cough, shortness of breath and wheezing.    Cardiovascular: Negative for chest pain, palpitations and leg swelling.   Gastrointestinal: Negative for abdominal pain, nausea and vomiting.   Musculoskeletal: Negative for arthralgias.   Skin: Negative for color change.   Neurological: Negative for dizziness and weakness.   Psychiatric/Behavioral: Negative for confusion.      Otherwise complete ROS reviewed and negative except as mentioned in the HPI.    Past Medical History:   Past Medical History:   Diagnosis Date   • Anemia    • Anxiety    • Asthma    • Clotting disorder (HCC)    • Deep vein thrombosis (DVT) of left upper extremity (HCC)    • Depression    • PTSD (post-traumatic stress disorder)    • Stroke ([facility])    • Urinary tract infection        Past Surgical History:  Past Surgical History:   Procedure Laterality Date   •  SECTION     • TONSILLECTOMY     • TUBAL ABDOMINAL LIGATION         Family History: family  history includes Anxiety disorder in her mother; Asthma in her father; Breast cancer in her maternal grandmother; Depression in her mother; Heart disease in her father.    Social History:  reports that she has been smoking cigarettes. She has a 15.00 pack-year smoking history. She has never used smokeless tobacco. She reports that she does not currently use alcohol. She reports that she does not currently use drugs.    Medications:  Prior to Admission medications    Medication Sig Start Date End Date Taking? Authorizing Provider   albuterol sulfate  (90 Base) MCG/ACT inhaler Inhale 2 puffs Every 4 (Four) Hours As Needed for Wheezing or Shortness of Air. 7/12/22   Louisa Cook APRN   aspirin 81 MG EC tablet Take 1 tablet by mouth Daily. 10/27/21   Den Agarwal MD   baclofen (LIORESAL) 10 MG tablet Take 1 tablet by mouth 3 (Three) Times a Day. 5/19/22   Louisa Cook APRN   buPROPion XL (Wellbutrin XL) 300 MG 24 hr tablet Take 1 tablet by mouth Daily. 9/15/22   Louisa Cook APRN   busPIRone (BUSPAR) 5 MG tablet Take 1 tablet by mouth 3 (Three) Times a Day. 5/19/22   Louisa Cook APRN   cyanocobalamin 1000 MCG/ML injection Inject 1 mL into the appropriate muscle as directed by prescriber Every 28 (Twenty-Eight) Days. 7/12/22   Louisa Cook APRN   desvenlafaxine (Pristiq) 50 MG 24 hr tablet Take 1 tablet by mouth Daily. 9/27/22   Louisa Cook APRN   Eliquis 5 MG tablet tablet Take 5 mg by mouth Every 12 (Twelve) Hours. 2/21/22   Provider, MD Luis   hydrOXYzine (ATARAX) 10 MG tablet Take 1 tablet by mouth 3 (Three) Times a Day As Needed for Anxiety. 5/4/22   Yuki Iniguez APRN   Iron, Ferrous Gluconate, 256 (28 Fe) MG tablet Take 1 tablet by mouth 2 (Two) Times a Day. 3/14/22   Louisa Cook APRN   levocetirizine (XYZAL) 5 MG tablet TAKE 1 TABLET BY MOUTH ONCE DAILY IN THE EVENING 5/23/22   Louisa Cook APRN  "  methylPREDNISolone (MEDROL) 4 MG dose pack Take as directed on package instructions. 8/5/22   Louisa Cook APRN   norethindrone (MICRONOR) 0.35 MG tablet Take 1 tablet by mouth Daily for 112 days. 6/15/22 10/5/22  Ruth Medel DO   rosuvastatin (Crestor) 5 MG tablet Take 1 tablet by mouth Daily. 10/26/21   Den Agarwal MD   Syringe 20G X 1-1/2\" 3 ML misc 1 each Every 30 (Thirty) Days. 7/12/22   Louisa Cook APRN     Allergies:  Allergies   Allergen Reactions   • Penicillins Hives       Objective     Vital Signs: There were no vitals taken for this visit.    Vitals and nursing note reviewed.   Constitutional:       Appearance: Normal appearance. Well-developed and not in distress. Acutely ill-appearing.   Eyes:      Extraocular Movements: Extraocular movements intact.      Pupils: Pupils are equal, round, and reactive to light.   HENT:      Head: Normocephalic and atraumatic.    Mouth/Throat:      Pharynx: Oropharynx is clear.   Neck:      Vascular: JVD normal.      Trachea: Trachea normal.   Pulmonary:      Effort: Pulmonary effort is normal.      Breath sounds: Normal breath sounds. No wheezing. No rhonchi. No rales.   Cardiovascular:      PMI at left midclavicular line. Tachycardia present. Regular rhythm. Normal S1. Normal S2.      Murmurs: There is a grade 2/6 systolic murmur.      No gallop. No rub.   Pulses:     Dorsalis pedis: 2+ bilaterally.     Posterior tibial: 2+ bilaterally.  Abdominal:      General: Bowel sounds are normal.      Palpations: Abdomen is soft.      Tenderness: There is no abdominal tenderness.   Musculoskeletal: Normal range of motion.      Cervical back: Normal range of motion and neck supple. Skin:     General: Skin is warm and dry.      Capillary Refill: Capillary refill takes less than 2 seconds.   Feet:      Right foot:      Skin integrity: Skin integrity normal.      Left foot:      Skin integrity: Skin integrity normal.   Neurological:      " Mental Status: Alert and oriented to person, place and time.      Cranial Nerves: Cranial nerves are intact.      Sensory: Sensation is intact.      Motor: Motor function is intact.      Coordination: Coordination is intact.   Psychiatric:         Speech: Speech normal.         Behavior: Behavior is cooperative.           Assessment / Plan        Active Hospital Problems    Diagnosis    • ST elevation myocardial infarction involving right coronary artery (HCC)    • Nicotine abuse    • Peripheral vascular disease (HCC)    Plan:    We will take patient emergently to the Cath Lab to assess her anatomy with plans for intervention to the culprit lesion.    Echocardiogram postprocedure.    Optimize medical regimen modify cardiovascular risk factors prior to discharge.  Counseled extensively on the need to stop smoking, especially given her history of thrombus in the recent past.      Code Status: Full     I discussed the patients findings and my recommendations with: Patient    Estimated length of stay: 1 to 2 days  Saturnino Ross DO  Interventional cardiology  [facility]  11/02/22   08:22 CDT

## 2022-11-02 NOTE — OP NOTE
"  [facility]  Date of procedure: 11/2/2022     Procedures performed:     1.  Access to the right femoral artery via modified Seldinger technique  2.  Left heart catheterization with retrograde crosscut aortic valve to the left ventricle where pressures were recorded  3.  Selective bilateral coronary angiography  4.  LV ventriculography  5.  Successful PTCA to the distal LAD with a 2 oh by 8 mm balloon  6.  Conscious sedation with continuous hemodynamic monitoring for 1 hour  7.  Selective right iliofemoral angiography for possible device closure  8.  Patent hemostasis achieved in the right femoral artery access site using a 6 Cymro Angio-Seal closure device      Risk, Benefits, and Alternatives discussed with the patient and/or family.  Plan is for moderate sedation and the patient agrees to proceed with the procedure.  An immediate assessment was done prior to the administration of moderate sedation     Indication: STEMI  Premedication: Versed, fentanyl  Contrast: Isovue 361 cc  Radiation: Flouro time= 10 minutes Air Kerma= 682 mGy  Catheters: 6Fr JL4, 6Fr JR4, 6Fr angled pigtail  Guiding catheter: JR4 guide, XB 3.5  PCI Hardware: .014\" BMW wire, 01 4 inch whisper extra-support wire, emerge MR 2 x 8 mm balloon      Procedural details:    The patient was brought to the cath lab and prepped and draped in the usual fashion.  Access was obtained in the right femoral artery via modified Seldinger technique.  A 6 Cymro sheath was placed into the artery and flushed.  Next, a JR4 guide catheter was inserted and used to engage the right and selective right coronary angiography was performed in multiple views.  Next, a JL 4 catheter was inserted and used to engage the left and selective left coronary angiography was performed in multiple views.  Next, pigtail catheter was inserted and used to cross the aortic valve to the left ventricle where pressures were recorded.  LV ventriculography was then " performed.  This cath was then withdrawn back cross aortic valve and again pressures were recorded.  Next, a XB 3.5 catheter was inserted and used to engage the left main.  A BMW wire was inserted and advanced into the distal LAD.  A 2 x 8 mm balloon was inserted and advanced into the distal LAD where it was inflated multiple times.  Post intervention angiography was performed.  The wire was exchanged for a whisper extra-support wire which was advanced into the distal LAD.  The 2 x 8 mm balloon was reinserted and reinflated.  Post intervention angiography was performed.  Dr. Carrion and Dr. Quijano reviewed the films.  Everything was then withdrawn to the sheath and the sheath was flushed.  Selective right iliofemoral angiography was performed for possible device closure.  Patent hemostasis was achieved in the right femoral artery access site using a 6 Maori Angio-Seal closure device.  Patient tolerated the procedure well without any complications.  She left the Cath Lab in stable condition.      I supervised the administration of conscious sedation by nursing staff throughout the case.  First dose was given at 0826 and the end of my face-to-face encounter was at 0926, accounting for a total of 60 minutes of supervision.  During the case, continuous pulse oximetry, heart rate, blood pressure, and patient status were monitored.     Findings:    Hemodynamics:    Aorta: 127/69 mmHg  LV: 145/1 mmHg  LVEDP: 11 mmHg    Left ventriculography:  1. The contractility of the left ventricle is mildly reduced with.  Hypokinesis of the basal segments and hypo-/akinesis of the apical segments.  Estimated ejection fraction 50%.  2. The left ventricle is normal in wall thickness and chamber size.  3. There is a mild gradient across the aortic valve    Selective coronary angiography:     Left main: Left main is a large-caliber vessel that bifurcates into the LAD and left circumflex coronary arteries, no angiographic evidence of  stenosis, MICHELLE-3 flow    LAD: The LAD is a moderate caliber vessel with 30 to 40% stenosis in the proximal segment, minor disease in the mid vessel, the distal vessel is small caliber and has 100% stenosis prior to the vessel wrapping around the apex, MICHELLE-3 flow proximal to the 100% stenosis with MICHELLE 0 flow distal to the 100% stenosis distally.  Status post PTCA, we still had 100% stenosis and MICHELLE 0 flow.      Diagonals: The first diagonal is small to moderate caliber with no significant disease, the second diagonal is small caliber with no significant disease    Left circumflex: Left circumflex is a very large, dominant vessel with no angiographic evidence of stenosis, MICHELLE-3 flow    Obtuse marginals: The first obtuse marginal is a moderate caliber vessel with no significant disease, the second obtuse marginal is moderate caliber with no significant disease    PDA/CECY: Moderate caliber with no significant disease    RCA: The right coronary artery is a moderate caliber, nondominant vessel with     Estimated Blood Loss: Minimal    Specimens: None    Complications: None       Impression:  1.  Coronary artery disease as described above with the culprit lesion identified as the distal LAD status post PTCA with 100% stenosis remaining and MICHELLE 0 flow.  This is a small vessel and will therefore be managed medically  2.  Nicotine abuse  3.  History of possible clotting disorder with venoocclusive disease in the remote past followed by Dr. Mares     Plan:   1.  Admit to the ICU and monitor closely  2.  Transthoracic echocardiogram later today to further assess LV function  3.  Recommend full anticoagulation as well as Plavix 75 daily  4.  High intensity statin with Crestor 20  5.  Close follow-up with [facility] cardiology as an outpatient    Saturnino Ross, DO  Interventional cardiology  [facility]  November 2, 2022

## 2022-11-02 NOTE — PLAN OF CARE
Goal Outcome Evaluation:  Plan of Care Reviewed With: patient        Progress: improving  Outcome Evaluation: R groin site CDI. VSS. A&Ox4. Tridil, NS, and heparin gtt maintained. Afebrile. Headache controlled with Tylenol.

## 2022-11-02 NOTE — ED PROVIDER NOTES
Subjective   History of Present Illness          History provided by:  Patient      Review of Systems    Past Medical History:   Diagnosis Date   • Anemia    • Anxiety    • Asthma    • Clotting disorder (HCC)    • Deep vein thrombosis (DVT) of left upper extremity (HCC)    • Depression    • PTSD (post-traumatic stress disorder)    • Stroke (HCC)    • Urinary tract infection 2018       Allergies   Allergen Reactions   • Penicillins Hives       Past Surgical History:   Procedure Laterality Date   •  SECTION     • TONSILLECTOMY     • TUBAL ABDOMINAL LIGATION         Family History   Problem Relation Age of Onset   • Heart disease Father    • Asthma Father    • Anxiety disorder Mother    • Depression Mother    • Breast cancer Maternal Grandmother    • Ovarian cancer Neg Hx    • Uterine cancer Neg Hx    • Colon cancer Neg Hx    • Melanoma Neg Hx        Social History     Socioeconomic History   • Marital status:    Tobacco Use   • Smoking status: Every Day     Packs/day: 1.00     Years: 15.00     Pack years: 15.00     Types: Cigarettes   • Smokeless tobacco: Never   Vaping Use   • Vaping Use: Never used   Substance and Sexual Activity   • Alcohol use: Not Currently   • Drug use: Not Currently   • Sexual activity: Yes     Partners: Male     Comment:            Objective   Physical Exam    ECG 12 Lead      Date/Time: 2022 7:35 AM  Performed by: Royce Arellano MD  Authorized by: Royce Arellano MD   Interpreted by physician  Comments: Sinus rhythm, rate 85, normal axis normal intervals, there is ST elevation present in lead to 3 aVF with reciprocal depression in aVL.  ST depression V2.  Slight elevations leads V5 and V6.  Consistent with inferior ST elevation MI.                 ED Course                                           MDM    Final diagnoses:   None       ED Disposition  ED Disposition     None          No follow-up provider specified.       Medication List        Notice    Cannot display patient medications because the patient has not yet arrived.

## 2022-11-03 LAB
ANION GAP SERPL CALCULATED.3IONS-SCNC: 8 MMOL/L (ref 5–15)
BASOPHILS # BLD AUTO: 0.1 10*3/MM3 (ref 0–0.2)
BASOPHILS NFR BLD AUTO: 1 % (ref 0–1.5)
BUN SERPL-MCNC: 4 MG/DL (ref 6–20)
BUN/CREAT SERPL: 6.3 (ref 7–25)
CALCIUM SPEC-SCNC: 8.6 MG/DL (ref 8.6–10.5)
CHLORIDE SERPL-SCNC: 107 MMOL/L (ref 98–107)
CHOLEST SERPL-MCNC: 148 MG/DL (ref 0–200)
CO2 SERPL-SCNC: 24 MMOL/L (ref 22–29)
CREAT SERPL-MCNC: 0.63 MG/DL (ref 0.57–1)
DEPRECATED RDW RBC AUTO: 49.6 FL (ref 37–54)
EGFRCR SERPLBLD CKD-EPI 2021: 113.7 ML/MIN/1.73
EOSINOPHIL # BLD AUTO: 0.35 10*3/MM3 (ref 0–0.4)
EOSINOPHIL NFR BLD AUTO: 3.4 % (ref 0.3–6.2)
ERYTHROCYTE [DISTWIDTH] IN BLOOD BY AUTOMATED COUNT: 18 % (ref 12.3–15.4)
GLUCOSE BLDC GLUCOMTR-MCNC: 130 MG/DL (ref 70–130)
GLUCOSE SERPL-MCNC: 113 MG/DL (ref 65–99)
HBA1C MFR BLD: 5.2 % (ref 4.8–5.6)
HCT VFR BLD AUTO: 33.2 % (ref 34–46.6)
HDLC SERPL-MCNC: 44 MG/DL (ref 40–60)
HGB BLD-MCNC: 8.9 G/DL (ref 12–15.9)
IMM GRANULOCYTES # BLD AUTO: 0.03 10*3/MM3 (ref 0–0.05)
IMM GRANULOCYTES NFR BLD AUTO: 0.3 % (ref 0–0.5)
LDLC SERPL CALC-MCNC: 94 MG/DL (ref 0–100)
LDLC/HDLC SERPL: 2.15 {RATIO}
LYMPHOCYTES # BLD AUTO: 1.67 10*3/MM3 (ref 0.7–3.1)
LYMPHOCYTES NFR BLD AUTO: 16 % (ref 19.6–45.3)
MCH RBC QN AUTO: 20.5 PG (ref 26.6–33)
MCHC RBC AUTO-ENTMCNC: 26.8 G/DL (ref 31.5–35.7)
MCV RBC AUTO: 76.3 FL (ref 79–97)
MONOCYTES # BLD AUTO: 1.05 10*3/MM3 (ref 0.1–0.9)
MONOCYTES NFR BLD AUTO: 10.1 % (ref 5–12)
NEUTROPHILS NFR BLD AUTO: 69.2 % (ref 42.7–76)
NEUTROPHILS NFR BLD AUTO: 7.24 10*3/MM3 (ref 1.7–7)
NRBC BLD AUTO-RTO: 0 /100 WBC (ref 0–0.2)
PLATELET # BLD AUTO: 320 10*3/MM3 (ref 140–450)
PMV BLD AUTO: 10.9 FL (ref 6–12)
POTASSIUM SERPL-SCNC: 3.8 MMOL/L (ref 3.5–5.2)
RBC # BLD AUTO: 4.35 10*6/MM3 (ref 3.77–5.28)
SODIUM SERPL-SCNC: 139 MMOL/L (ref 136–145)
TRIGL SERPL-MCNC: 47 MG/DL (ref 0–150)
VLDLC SERPL-MCNC: 10 MG/DL (ref 5–40)
WBC NRBC COR # BLD: 10.44 10*3/MM3 (ref 3.4–10.8)

## 2022-11-03 PROCEDURE — 99233 SBSQ HOSP IP/OBS HIGH 50: CPT | Performed by: EMERGENCY MEDICINE

## 2022-11-03 PROCEDURE — 93005 ELECTROCARDIOGRAM TRACING: CPT | Performed by: EMERGENCY MEDICINE

## 2022-11-03 PROCEDURE — 83036 HEMOGLOBIN GLYCOSYLATED A1C: CPT | Performed by: EMERGENCY MEDICINE

## 2022-11-03 PROCEDURE — 82962 GLUCOSE BLOOD TEST: CPT

## 2022-11-03 PROCEDURE — 80048 BASIC METABOLIC PNL TOTAL CA: CPT | Performed by: EMERGENCY MEDICINE

## 2022-11-03 PROCEDURE — 85025 COMPLETE CBC W/AUTO DIFF WBC: CPT | Performed by: EMERGENCY MEDICINE

## 2022-11-03 PROCEDURE — 80061 LIPID PANEL: CPT | Performed by: EMERGENCY MEDICINE

## 2022-11-03 PROCEDURE — 93010 ELECTROCARDIOGRAM REPORT: CPT | Performed by: INTERNAL MEDICINE

## 2022-11-03 RX ORDER — LEVOCETIRIZINE DIHYDROCHLORIDE 5 MG/1
5 TABLET, FILM COATED ORAL EVERY EVENING
COMMUNITY
End: 2023-01-09 | Stop reason: SDUPTHER

## 2022-11-03 RX ORDER — LISINOPRIL 5 MG/1
5 TABLET ORAL
Status: DISCONTINUED | OUTPATIENT
Start: 2022-11-03 | End: 2022-11-04 | Stop reason: HOSPADM

## 2022-11-03 RX ORDER — METOPROLOL SUCCINATE 25 MG/1
25 TABLET, EXTENDED RELEASE ORAL
Status: DISCONTINUED | OUTPATIENT
Start: 2022-11-03 | End: 2022-11-04 | Stop reason: HOSPADM

## 2022-11-03 RX ADMIN — METOPROLOL SUCCINATE 25 MG: 25 TABLET, EXTENDED RELEASE ORAL at 11:54

## 2022-11-03 RX ADMIN — BACLOFEN 10 MG: 10 TABLET ORAL at 08:27

## 2022-11-03 RX ADMIN — BACLOFEN 10 MG: 10 TABLET ORAL at 15:45

## 2022-11-03 RX ADMIN — BUPROPION HYDROCHLORIDE 300 MG: 150 TABLET, FILM COATED, EXTENDED RELEASE ORAL at 08:27

## 2022-11-03 RX ADMIN — DESVENLAFAXINE SUCCINATE 50 MG: 50 TABLET, EXTENDED RELEASE ORAL at 08:26

## 2022-11-03 RX ADMIN — LISINOPRIL 5 MG: 5 TABLET ORAL at 11:53

## 2022-11-03 RX ADMIN — ACETAMINOPHEN 650 MG: 325 TABLET, FILM COATED ORAL at 06:15

## 2022-11-03 RX ADMIN — BACLOFEN 10 MG: 10 TABLET ORAL at 22:08

## 2022-11-03 RX ADMIN — BUSPIRONE HYDROCHLORIDE 5 MG: 5 TABLET ORAL at 08:27

## 2022-11-03 RX ADMIN — BUSPIRONE HYDROCHLORIDE 5 MG: 5 TABLET ORAL at 22:08

## 2022-11-03 RX ADMIN — ASPIRIN 81 MG: 81 TABLET, COATED ORAL at 08:27

## 2022-11-03 RX ADMIN — ACETAMINOPHEN 650 MG: 325 TABLET, FILM COATED ORAL at 22:14

## 2022-11-03 RX ADMIN — Medication 1 APPLICATION: at 08:30

## 2022-11-03 RX ADMIN — ROSUVASTATIN CALCIUM 20 MG: 20 TABLET, FILM COATED ORAL at 08:27

## 2022-11-03 RX ADMIN — CLOPIDOGREL 75 MG: 75 TABLET, FILM COATED ORAL at 08:27

## 2022-11-03 RX ADMIN — APIXABAN 5 MG: 5 TABLET, FILM COATED ORAL at 22:08

## 2022-11-03 RX ADMIN — BUSPIRONE HYDROCHLORIDE 5 MG: 5 TABLET ORAL at 15:45

## 2022-11-03 RX ADMIN — APIXABAN 5 MG: 5 TABLET, FILM COATED ORAL at 08:27

## 2022-11-03 RX ADMIN — Medication 1 APPLICATION: at 22:14

## 2022-11-03 NOTE — PLAN OF CARE
Rested well throughout night, family updated at bedside, VS WNL, NTG gtt stopped at beginning of shift, no complaints of chest pain, right groin cath site free of complications, SR on tele

## 2022-11-03 NOTE — PAYOR COMM NOTE
"11/2/22 [facility] [phone]    [phone]    ER ADMIT TO CCU ON 11/2/22. INPATIENT ORDER.  FAXING FOR INPATIENT REVIEW.          Mark Huddleston (42 y.o. Female)     Date of Birth   1980    Social Security Number       Address   [address] [place] [address]    Home Phone   [phone]    MRN   [id number]       Christianity   Zoroastrianism    Marital Status                               Admission Date   11/2/22    Admission Type   Urgent    Admitting Provider   Saturnino Ross DO    Attending Provider   Saturnino Ross DO    Department, Room/Bed   [facility] CARDIAC CARE, C009/1       Discharge Date       Discharge Disposition       Discharge Destination                               Attending Provider: Saturnino Ross DO    Allergies: Penicillins    Isolation: None   Infection: None   Code Status: CPR    Ht: 177.8 cm (70\")   Wt: 98 kg (216 lb)    Admission Cmt: None   Principal Problem: STEMI (ST elevation myocardial infarction) ([facility]) [I21.3]                 Active Insurance as of 11/2/2022     Primary Coverage     Payor Plan Insurance Group Employer/Plan Group    [place] [id number]     Payor Plan Address Payor Plan Phone Number Payor Plan Fax Number Effective Dates    HU[employer]NA [employer]ICAL [address] [address] [phone]  11/1/2021 - None Entered    [facility] [address]       Subscriber Name Subscriber Birth Date Member ID       MARK HUDDLESTON CIPRIANO 1980 [id number]                 Emergency Contacts      (Rel.) Home Phone Work Phone Mobile Phone    DAVID HUDDLESTON (Spouse) [phone]    Maria Guadalupe Lora (Sister) -- -- --           [facility] Encounter Date/Time: 11/2/2022 0818   Hospital Account: [id number]    MRN: [id number]   Patient:  Mark Huddleston   Contact Serial #: [id number]   SSN:          ENCOUNTER             Patient " Class: Inpatient   Unit: [facility] CCU   Hospital Service: Cardiology     Bed: C009/1   Admitting Provider: Saturnino Ross*   Referring Physician: Светлана Dowling   Attending Provider: Saturnino Ross*   Adm Diagnosis: STEMI (ST elevation myoc*               PATIENT          Name: Mark Huddleston : 1980 (42 yrs)   Address: [address] [place] [address] Sex: Female   City: [place] [address]   County: [place]   Marital Status:  Ethnicity: NOT                                                                              Race: WHITE   Primary Care Provider: Louisa Cook* Patients Phone: Home Phone: [phone]     Mobile Phone: [phone]   EMERGENCY CONTACT   Contact Name Legal Guardian? Relationship to Patient Home Phone Work Phone   1. DAVID HUDDLESTON  2. Maria Guadalupe Lora      Spouse  Sister [phone]         GUARANTOR            Guarantor: Mark Huddleston     : 1980   Address: [address] [place] [address] Sex: [place]     [place] [address]     Relation to Patient: Self       Home Phone: [phone]   Guarantor ID: [id number]       Work Phone:     GUARANTOR EMPLOYER   Employer:           Status: NOT EMPLO*   COVERAGE  PRIMARY INSURANCE   Payor[place] Plan: [place]   Group Number: [id number] Insurance Type: INDEMNITY   Subscriber Name: MARK HUDDLESTON Subscriber : 1980   Subscriber ID: [id number] Coverage Address: [facility] [address] [place] [address]   Pat. Rel. to Subscriber: Self Coverage Phone: [phone]   SECONDARY INSURANCE   Payor: N/A Plan: N/A   Group Number:   Insurance Type:     Subscriber Name:   Subscriber :     Subscriber ID:   Coverage Address:     Pat. Rel. to Subscriber:   Coverage Phone:        Contact Serial # ([id number])       November 3, 2022    Chart ID ([id number])                 Saturnino Ross DO   Physician  Cardiology  H&P     Signed  Date of Service:   11/02/22 0819  Creation Time:  11/02/22 0819     Signed        Expand AllCollapse All       [facility] - CARDIOLOGY  HISTORY AND PHYSICAL     Date of Admission: 11/2/2022  Primary Care Physician: Louisa Cook APRN     Subjective      Chief Complaint: Chest pain     History of Present Illness     Patient is a 42-year-old female with a past medical history of 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  She has been having chest pain on and off for the past 2 days.  Patient is already established with vascular surgery, Dr. Mares who has been following her for the past year.  Records indicate that she also has a history of posttraumatic stress disorder, drug abuse and depression.     Echocardiogram from October of last year showed normal systolic function.     Review of Systems   Constitutional: Negative for fatigue.   HENT: Negative for trouble swallowing.    Eyes: Negative for pain.   Respiratory: Positive for chest tightness. Negative for cough, shortness of breath and wheezing.    Cardiovascular: Negative for chest pain, palpitations and leg swelling.   Gastrointestinal: Negative for abdominal pain, nausea and vomiting.   Musculoskeletal: Negative for arthralgias.   Skin: Negative for color change.   Neurological: Negative for dizziness and weakness.   Psychiatric/Behavioral: Negative for confusion.      Otherwise complete ROS reviewed and negative except as mentioned in the HPI.     Past Medical History:               Past Medical History:   Medical History        Past Medical History:   Diagnosis Date   • Anemia 2021   • Anxiety     • Asthma     • Clotting disorder (HCC) 2021   • Deep vein thrombosis (DVT) of left upper extremity (HCC)     • Depression     • PTSD (post-traumatic stress disorder)     • Stroke (HCC) 2021   • Urinary tract infection 2018         Vital Signs: There  were no vitals taken for this visit.     Vitals and nursing note reviewed.   Constitutional:       Appearance: Normal appearance. Well-developed and not in distress. Acutely ill-appearing.   Eyes:      Extraocular Movements: Extraocular movements intact.      Pupils: Pupils are equal, round, and reactive to light.   HENT:      Head: Normocephalic and atraumatic.    Mouth/Throat:      Pharynx: Oropharynx is clear.   Neck:      Vascular: JVD normal.      Trachea: Trachea normal.   Pulmonary:      Effort: Pulmonary effort is normal.      Breath sounds: Normal breath sounds. No wheezing. No rhonchi. No rales.   Cardiovascular:      PMI at left midclavicular line. Tachycardia present. Regular rhythm. Normal S1. Normal S2.      Murmurs: There is a grade 2/6 systolic murmur.      No gallop. No rub.   Pulses:     Dorsalis pedis: 2+ bilaterally.     Posterior tibial: 2+ bilaterally.  Abdominal:      General: Bowel sounds are normal.      Palpations: Abdomen is soft.      Tenderness: There is no abdominal tenderness.   Musculoskeletal: Normal range of motion.      Cervical back: Normal range of motion and neck supple. Skin:     General: Skin is warm and dry.      Capillary Refill: Capillary refill takes less than 2 seconds.   Feet:      Right foot:      Skin integrity: Skin integrity normal.      Left foot:      Skin integrity: Skin integrity normal.   Neurological:      Mental Status: Alert and oriented to person, place and time.      Cranial Nerves: Cranial nerves are intact.      Sensory: Sensation is intact.      Motor: Motor function is intact.      Coordination: Coordination is intact.   Psychiatric:         Speech: Speech normal.         Behavior: Behavior is cooperative.               Assessment / Plan              Active Hospital Problems     Diagnosis     • ST elevation myocardial infarction involving right coronary artery (HCC)     • Nicotine abuse     • Peripheral vascular disease (HCC)     Plan:     We will take  patient emergently to the Cath Lab to assess her anatomy with plans for intervention to the culprit lesion.     Echocardiogram postprocedure.     Optimize medical regimen modify cardiovascular risk factors prior to discharge.  Counseled extensively on the need to stop smoking, especially given her history of thrombus in the recent past.        Code Status: Full     I discussed the patients findings and my recommendations with: Patient     Estimated length of stay: 1 to 2 days  Saturnino Ross DO  Interventional cardiology  [facility]  11/02/22   08:22 CDT         Saturnino Ross DO   Physician  Cardiology  Op Note     Signed  Date of Service:  11/02/22 0827  Creation Time:  11/02/22 0943  Case Time:  Procedures:  Surgeons:    11/02/22 0827 Left Heart Cath    Saturnino Ross DO Frossard, Stephen Michael,                Signed           [facility]  Date of procedure: 11/2/2022     Procedures performed:     1.  Access to the right femoral artery via modified Seldinger technique  2.  Left heart catheterization with retrograde crosscut aortic valve to the left ventricle where pressures were recorded  3.  Selective bilateral coronary angiography  4.  LV ventriculography  5.  Successful PTCA to the distal LAD with a 2 oh by 8 mm balloon  6.  Conscious sedation with continuous hemodynamic monitoring for 1 hour  7.  Selective right iliofemoral angiography for possible device closure  8.  Patent hemostasis achieved in the right femoral artery access site using a 6 Albanian Angio-Seal closure device        Risk, Benefits, and Alternatives discussed with the patient and/or family.  Plan is for moderate sedation and the patient agrees to proceed with the procedure.  An immediate assessment was done prior to the administration of moderate sedation     Indication: STEMI  Premedication: Versed, fentanyl  Contrast: Isovue 361 cc  Radiation: Flouro time= 10 minutes  "Air Kerma= 682 mGy  Catheters: 6Fr JL4, 6Fr JR4, 6Fr angled pigtail  Guiding catheter: JR4 guide, XB 3.5  PCI Hardware: .014\" BMW wire, 01 4 inch whisper extra-support wire, emerge MR 2 x 8 mm balloon        Procedural details:     The patient was brought to the cath lab and prepped and draped in the usual fashion.  Access was obtained in the right femoral artery via modified Seldinger technique.  A 6 Moroccan sheath was placed into the artery and flushed.  Next, a JR4 guide catheter was inserted and used to engage the right and selective right coronary angiography was performed in multiple views.  Next, a JL 4 catheter was inserted and used to engage the left and selective left coronary angiography was performed in multiple views.  Next, pigtail catheter was inserted and used to cross the aortic valve to the left ventricle where pressures were recorded.  LV ventriculography was then performed.  This cath was then withdrawn back cross aortic valve and again pressures were recorded.  Next, a XB 3.5 catheter was inserted and used to engage the left main.  A BMW wire was inserted and advanced into the distal LAD.  A 2 x 8 mm balloon was inserted and advanced into the distal LAD where it was inflated multiple times.  Post intervention angiography was performed.  The wire was exchanged for a whisper extra-support wire which was advanced into the distal LAD.  The 2 x 8 mm balloon was reinserted and reinflated.  Post intervention angiography was performed.  Dr. Carrion and Dr. Quijano reviewed the films.  Everything was then withdrawn to the sheath and the sheath was flushed.  Selective right iliofemoral angiography was performed for possible device closure.  Patent hemostasis was achieved in the right femoral artery access site using a 6 Moroccan Angio-Seal closure device.  Patient tolerated the procedure well without any complications.  She left the Cath Lab in stable condition.        I supervised the administration of " conscious sedation by nursing staff throughout the case.  First dose was given at 0826 and the end of my face-to-face encounter was at 0926, accounting for a total of 60 minutes of supervision.  During the case, continuous pulse oximetry, heart rate, blood pressure, and patient status were monitored.      Findings:     Hemodynamics:    Aorta: 127/69 mmHg  LV: 145/1 mmHg  LVEDP: 11 mmHg     Left ventriculography:  1. The contractility of the left ventricle is mildly reduced with.  Hypokinesis of the basal segments and hypo-/akinesis of the apical segments.  Estimated ejection fraction 50%.  2. The left ventricle is normal in wall thickness and chamber size.  3. There is a mild gradient across the aortic valve     Selective coronary angiography:      Left main: Left main is a large-caliber vessel that bifurcates into the LAD and left circumflex coronary arteries, no angiographic evidence of stenosis, MICHELLE-3 flow     LAD: The LAD is a moderate caliber vessel with 30               LAD: The LAD is a moderate caliber vessel with 30 to 40% stenosis in the proximal segment, minor disease in the mid vessel, the distal vessel is small caliber and has 100% stenosis prior to the vessel wrapping around the apex, MICHELLE-3 flow proximal to the 100% stenosis with MICHELLE 0 flow distal to the 100% stenosis distally.  Status post PTCA, we still had 100% stenosis and IMCHELLE 0 flow.       Diagonals: The first diagonal is small to moderate caliber with no significant disease, the second diagonal is small caliber with no significant disease     Left circumflex: Left circumflex is a very large, dominant vessel with no angiographic evidence of stenosis, MICHELLE-3 flow     Obtuse marginals: The first obtuse marginal is a moderate caliber vessel with no significant disease, the second obtuse marginal is moderate caliber with no significant disease     PDA/CECY: Moderate caliber with no significant disease     RCA: The right coronary artery is a  moderate caliber, nondominant vessel with      Estimated Blood Loss: Minimal     Specimens: None     Complications: None        Impression:  1.  Coronary artery disease as described above with the culprit lesion identified as the distal LAD status post PTCA with 100% stenosis remaining and MICHELLE 0 flow.  This is a small vessel and will therefore be managed medically  2.  Nicotine abuse  3.  History of possible clotting disorder with venoocclusive disease in the remote past followed by Dr. Mares     Plan:   1.  Admit to the ICU   and monitor closely  2.  Transthoracic echocardiogram later today to further assess LV function  3.  Recommend full anticoagulation as well as Plavix 75 daily  4.  High intensity statin with Crestor 20  5.  Close follow-up with [facility] cardiology as an outpatient     Saturnino Ross, DO  Interventional cardiology  [facility]  November 2, 2022 /02/22 1315 -- 70 -- 135/84 -- -- 92   11/02/22 1300 -- 71 -- 147/88 -- -- 92   11/02/22 1245 -- 67 -- 83/57 Abnormal  -- -- 97   11/02/22 1230 -- 76 -- 103/55 -- -- 96   11/02/22 1200 -- -- -- -- room air -- --   11/02/22 1145 -- 69 -- 85/61 Abnormal  -- -- --   11/02                Component Ref Range & Units 03:41   (11/3/22) 1 d ago   (11/2/22) 5 mo ago   (6/2/22) 5 mo ago   (5/19/22) 11 mo ago   (12/8/21) 11 mo ago   (11/29/21) 11 mo ago   (11/29/21)   WBC 3.40 - 10.80 10*3/mm3 10.44  11.07 High   6.8 R  5.4 R  6.10   6.10    RBC 3.77 - 5.28 10*6/mm3 4.35  4.26  4.36 R  3.82 R  3.64 Low    3.58 Low     Hemoglobin 12.0 - 15.9 g/dL 8.9 Low   8.9 Low   8.4 Low  R  6.9 Low  R  7.6 Low   7.0 Low   7.7 Low     Hematocrit 34.0 - 46.6 % 33.2 Low   32.1 Low   30.9 Low   26.0 Low   27.3 Low   24.7 Low   27.0 Low     MCV 79.0 - 97.0 fL 76.3 Low   75.4 Low   71 Low  R  68 Low  R  75.0 Low    75.4 Low     MCH 26.6 - 33.0 pg 20.5 Low   20.9 Low   19.3 Low   18.1 Low   20.9 Low    21.5 Low     MCHC 31.5 - 35.7 g/dL 26.8 Low   27.7 Low    27.2 Low   26.5 Low   27.8 Low    28.5 Low     RDW 12.3 - 15.4 % 18.0 High   18.3 High   21.2 High  R  19.2 High  R  18.1 High    18.0 High     RDW-SD 37.0 - 54.0 fl 49.6  49.4    47.8   48.0    MPV 6.0 - 12.0 fL 10.9  10.3    11.6   11.9    Platelets 140 - 450 10*3/mm3 320  312  324 R  285 R  264   266    Neutrophil % 42.7 - 76.0 % 69.2  70.6  55 R  50 R  57.4   54.1    Lymphocyte % 19.6 - 45.3 % 16.0 Low   17.8 Low   27 R  34 R  25.1   32.0    Monocyte % 5.0 - 12.0 % 10.1  7.7  8 R  10 R  11.0   8.5    Eosinophil % 0.3 - 6.2 % 3.4  2.6  8 R  5 R  5.9   4.6    Basophil % 0.0 - 1.5 % 1.0  0.8  2 R  1 R  0.3   0.5    Immature Grans % 0.0 - 0.5 % 0.3  0.5  0 R  0 R  0.3   0.3    Neutrophils, Absolute 1.70 - 7.00 10*3/mm3 7.24 High   7.81 High   3.7 R  2.6 R  3.50   3.30    Lymphocytes, Absolute 0.70 - 3.10 10*3/mm3 1.67  1.97  1.8 R  1.8 R  1.53   1.95    Monocytes, Absolute 0.10 - 0.90 10*3/mm3 1.05 High                       Component Ref Range & Units 03:41   (11/3/22) 5 mo ago   (5/19/22) 11 mo ago   (11/29/21) 1 yr ago   (10/26/21) 1 yr ago   (10/25/21) 1 yr ago   (10/24/21) 1 yr ago   (10/23/21)   Glucose 65 - 99 mg/dL 113 High   89  96  97  94  100 High   137 High     BUN 6 - 20 mg/dL 4 Low   7 R  10  10  13  13  11    Creatinine 0.57 - 1.00 mg/dL 0.63  0.82  0.70  0.73  0.76  0.65  0.77    Sodium 136 - 145 mmol/L 139  138 R  139  141  142  140  139    Potassium 3.5 - 5.2 mmol/L 3.8  4.0  4.3  3.9  3.7  3.5  4.2    Chloride 98 - 107 mmol/L 107  104 R  107  107  107  108 High   105    CO2 22.0 - 29.0 mmol/L 24.0   25.0  25.0  25.0  25.0  26.0    Calcium 8.6 - 10.5 mg/dL 8.6  8.9 R  8.7  8.7  8.8  8.6  9.3    BUN/Creatinine Ratio 7.0 - 25.0 6.3 Low                      Current Facility-Administered Medications   Medication Dose Route Frequency Provider Last Rate Last Admin   • acetaminophen (TYLENOL) tablet 650 mg  650 mg Oral Q4H PRN Saturnino Ross DO   650 mg at 11/03/22 0615   • albuterol sulfate  HFA (PROVENTIL HFA;VENTOLIN HFA;PROAIR HFA) inhaler 2 puff  2 puff Inhalation Q4H PRN Saturnino Ross DO       • apixaban (ELIQUIS) tablet 5 mg  5 mg Oral Q12H Saturnino Ross, DO   5 mg at 11/02/22 2024   • aspirin EC tablet 81 mg  81 mg Oral Daily Saturnino Ross DO   81 mg at 11/02/22 1137   • baclofen (LIORESAL) tablet 10 mg  10 mg Oral TID Saturnino Ross, DO   10 mg at 11/02/22 2024   • buPROPion XL (WELLBUTRIN XL) 24 hr tablet 300 mg  300 mg Oral Daily Saturnino Ross DO   300 mg at 11/02/22 1039   • busPIRone (BUSPAR) tablet 5 mg  5 mg Oral TID Saturnino Ross, DO   5 mg at 11/02/22 2024   • Chlorhexidine Gluconate Cloth 2 % pads 1 application  1 application Topical Q24H Saturnino Ross DO       • clopidogrel (PLAVIX) tablet 75 mg  75 mg Oral Daily Saturnino Ross DO       • desvenlafaxine (PRISTIQ) 24 hr tablet 50 mg  50 mg Oral Daily Saturnino Ross DO   50 mg at 11/02/22 1039   • hydrOXYzine (ATARAX) tablet 10 mg  10 mg Oral TID PRN Saturnion Ross DO       • mupirocin (BACTROBAN) 2 % nasal ointment 1 application  1 application Each Nare BID Saturnino Ross DO   1 application at 11/02/22 1630   • nitroglycerin (TRIDIL) 200 mcg/ml infusion  10-50 mcg/min Intravenous Titrated Saturnino Ross DO   Stopped at 11/02/22 2030   • ondansetron (ZOFRAN) tablet 4 mg  4 mg Oral Q6H PRN Saturnino Ross DO        Or   • ondansetron (ZOFRAN) injection 4 mg  4 mg Intravenous Q6H PRN Saturnino Ross DO       • rosuvastatin (CRESTOR) tablet 20 mg  20 mg Oral Daily Saturnino Ross DO   20 mg at 11/02/22 1040   • sodium chloride 0.9 % infusion  100 mL/hr Intravenous Continuous Saturnino Ross  mL/hr at 11/02/22 2334 100 mL/hr at 11/02/22 2337

## 2022-11-03 NOTE — CASE MANAGEMENT/SOCIAL WORK
Discharge Planning Assessment  [facility]     Patient Name: Mark Huddleston  MRN: [id number]  Today's Date: 11/3/2022    Admit Date: 11/2/2022        Discharge Needs Assessment     Row Name 11/03/22 1029       Living Environment    People in Home spouse    Name(s) of People in Home Zoltan Huddleston    Current Living Arrangements home    Primary Care Provided by self    Provides Primary Care For no one    Quality of Family Relationships unable to assess    Able to Return to Prior Arrangements yes       Resource/Environmental Concerns    Resource/Environmental Concerns none    Transportation Concerns none       Transition Planning    Patient/Family Anticipates Transition to home with family    Patient/Family Anticipated Services at Transition none    Transportation Anticipated family or friend will provide       Discharge Needs Assessment    Readmission Within the Last 30 Days no previous admission in last 30 days    Equipment Currently Used at Home none    Concerns to be Addressed no discharge needs identified    Anticipated Changes Related to Illness none    Equipment Needed After Discharge none    Discharge Coordination/Progress Patient resides at home with her spouse and is independent.  Patient has a PCP and RX coverage.  Patient plans to return home upon discharge with no anticipated needs.               Discharge Plan    No documentation.               Continued Care and Services - Admitted Since 11/2/2022    Coordination has not been started for this encounter.          Demographic Summary    No documentation.                Functional Status    No documentation.                Psychosocial    No documentation.                Abuse/Neglect    No documentation.                Legal    No documentation.                Substance Abuse    No documentation.                Patient Forms    No documentation.                   CHELSEA Marte

## 2022-11-03 NOTE — PROGRESS NOTES
[facility] -  Progress Note     LOS: 1 day   Patient Care Team:  Louisa Cook APRN as PCP - General (Nurse Practitioner)    Chief Complaint: Chest pain    Subjective     Interval History:     Patient seen and examined at bedside.  No significant chest pain over the past 12 hours.  The nitro drip has been weaned off.    Review of Systems:  Review of Systems   Constitutional: Negative for diaphoresis, fever and malaise/fatigue.   HENT: Negative for congestion.    Eyes: Negative for vision loss in left eye and vision loss in right eye.   Cardiovascular: Negative for chest pain, claudication, dyspnea on exertion, irregular heartbeat, leg swelling, orthopnea, palpitations and syncope.   Respiratory: Negative for cough, shortness of breath and wheezing.    Hematologic/Lymphatic: Negative for adenopathy.   Skin: Negative for rash.   Musculoskeletal: Negative for joint pain and joint swelling.   Gastrointestinal: Negative for abdominal pain, diarrhea, nausea and vomiting.   Neurological: Negative for excessive daytime sleepiness, dizziness, focal weakness, light-headedness, numbness and weakness.   Psychiatric/Behavioral: Negative for depression. The patient does not have insomnia.        Vital Sign Min/Max for last 24 hours  Temp  Min: 97.5 °F (36.4 °C)  Max: 98.9 °F (37.2 °C)   BP  Min: 83/57  Max: 156/100   Pulse  Min: 67  Max: 81   Resp  Min: 16  Max: 18   SpO2  Min: 89 %  Max: 98 %   No data recorded   Weight  Min: 84 kg (185 lb 3 oz)  Max: 98 kg (216 lb)         11/03/22  0400   Weight: 98 kg (216 lb)       Physical Exam:   Vitals and nursing note reviewed.   Constitutional:       Appearance: Normal and healthy appearance. Well-developed and not in distress.   Eyes:      Extraocular Movements: Extraocular movements intact.      Pupils: Pupils are equal, round, and reactive to light.   HENT:      Head: Normocephalic and atraumatic.    Mouth/Throat:      Pharynx: Oropharynx is  clear.   Neck:      Vascular: JVD normal.      Trachea: Trachea normal.   Pulmonary:      Effort: Pulmonary effort is normal.      Breath sounds: Normal breath sounds. No wheezing. No rhonchi. No rales.   Cardiovascular:      PMI at left midclavicular line. Normal rate. Regular rhythm. Normal S1. Normal S2.      Murmurs: There is no murmur.      No gallop. No rub.   Pulses:     Dorsalis pedis: 2+ bilaterally.     Posterior tibial: 2+ bilaterally.  Abdominal:      General: Bowel sounds are normal.      Palpations: Abdomen is soft.      Tenderness: There is no abdominal tenderness.   Musculoskeletal: Normal range of motion.      Cervical back: Normal range of motion and neck supple. Skin:     General: Skin is warm and dry.      Capillary Refill: Capillary refill takes less than 2 seconds.   Feet:      Right foot:      Skin integrity: Skin integrity normal.      Left foot:      Skin integrity: Skin integrity normal.   Neurological:      Mental Status: Alert and oriented to person, place and time.      Cranial Nerves: Cranial nerves are intact.      Sensory: Sensation is intact.      Motor: Motor function is intact.      Coordination: Coordination is intact.   Psychiatric:         Speech: Speech normal.         Behavior: Behavior is cooperative.         Medication Review: yes  Current Facility-Administered Medications   Medication Dose Route Frequency Provider Last Rate Last Admin   • acetaminophen (TYLENOL) tablet 650 mg  650 mg Oral Q4H PRN Saturnino Ross DO   650 mg at 11/03/22 0615   • albuterol sulfate HFA (PROVENTIL HFA;VENTOLIN HFA;PROAIR HFA) inhaler 2 puff  2 puff Inhalation Q4H PRN Saturnino Ross DO       • apixaban (ELIQUIS) tablet 5 mg  5 mg Oral Q12H Saturnino Ross DO   5 mg at 11/03/22 0827   • aspirin EC tablet 81 mg  81 mg Oral Daily Saturnino Ross DO   81 mg at 11/03/22 0827   • baclofen (LIORESAL) tablet 10 mg  10 mg Oral TID Saturnino Ross DO    10 mg at 11/03/22 0827   • buPROPion XL (WELLBUTRIN XL) 24 hr tablet 300 mg  300 mg Oral Daily Saturnino Ross DO   300 mg at 11/03/22 0827   • busPIRone (BUSPAR) tablet 5 mg  5 mg Oral TID Saturnino Ross DO   5 mg at 11/03/22 0827   • Chlorhexidine Gluconate Cloth 2 % pads 1 application  1 application Topical Q24H Saturnino Ross DO       • clopidogrel (PLAVIX) tablet 75 mg  75 mg Oral Daily Saturnino Ross DO   75 mg at 11/03/22 0827   • desvenlafaxine (PRISTIQ) 24 hr tablet 50 mg  50 mg Oral Daily Saturnino Ross DO   50 mg at 11/03/22 0826   • hydrOXYzine (ATARAX) tablet 10 mg  10 mg Oral TID PRN Saturnino Ross DO       • lisinopril (PRINIVIL,ZESTRIL) tablet 5 mg  5 mg Oral Q24H Saturnino Ross DO       • metoprolol succinate XL (TOPROL-XL) 24 hr tablet 25 mg  25 mg Oral Q24H Saturnino Ross DO       • mupirocin (BACTROBAN) 2 % nasal ointment 1 application  1 application Each Nare BID Saturnino Ross DO   1 application at 11/03/22 0830   • nitroglycerin (TRIDIL) 200 mcg/ml infusion  10-50 mcg/min Intravenous Titrated Saturnino Ross DO   Stopped at 11/02/22 2030   • ondansetron (ZOFRAN) tablet 4 mg  4 mg Oral Q6H PRN Saturnino Ross DO        Or   • ondansetron (ZOFRAN) injection 4 mg  4 mg Intravenous Q6H PRN Saturnino Ross DO       • rosuvastatin (CRESTOR) tablet 20 mg  20 mg Oral Daily Saturnino Ross DO   20 mg at 11/03/22 0827   • sodium chloride 0.9 % infusion  100 mL/hr Intravenous Continuous Saturnino Ross  mL/hr at 11/02/22 2334 100 mL/hr at 11/02/22 2334             Assessment & Plan       STEMI (ST elevation myocardial infarction) (HCC)    Peripheral vascular disease (HCC)    ST elevation myocardial infarction involving right coronary artery (HCC)    Nicotine abuse      Plan:    We will transfer the patient to the telemetry unit today  with likely discharge home Friday morning.  Echocardiogram did show some akinetic segments apically but overall systolic function remained in the 60s.  No significant valve disease.    Would recommend the following cardiac regimen:  1.  Eliquis 5 mg twice daily for life.  Patient admitted to me yesterday that she has been missing recent doses of this medication over the past couple of months due to being forgetful.  2.  Aspirin 81 mg daily for 30 days  3.  Plavix 75 mg daily for life  4.  Crestor 20 mg  5.  Lisinopril 5 mg  6.  Toprol-XL 25 mg    Patient will need close follow-up with [facility] cardiology as an outpatient.  The most important thing is for patient to remain compliant with her medications including antiplatelets and anticoagulants to prevent recurrent episodes.    Saturnino Ross, DO   Interventional cardiology  [facility]  11/03/22  09:21 CDT

## 2022-11-04 ENCOUNTER — READMISSION MANAGEMENT (OUTPATIENT)
Dept: CALL CENTER | Facility: HOSPITAL | Age: 42
End: 2022-11-04

## 2022-11-04 VITALS
WEIGHT: 216.8 LBS | DIASTOLIC BLOOD PRESSURE: 53 MMHG | OXYGEN SATURATION: 93 % | HEIGHT: 70 IN | RESPIRATION RATE: 20 BRPM | BODY MASS INDEX: 31.04 KG/M2 | HEART RATE: 58 BPM | TEMPERATURE: 98.2 F | SYSTOLIC BLOOD PRESSURE: 110 MMHG

## 2022-11-04 DIAGNOSIS — E53.8 B12 DEFICIENCY: ICD-10-CM

## 2022-11-04 DIAGNOSIS — I21.3 ST ELEVATION MYOCARDIAL INFARCTION (STEMI), UNSPECIFIED ARTERY: Primary | ICD-10-CM

## 2022-11-04 PROCEDURE — 99239 HOSP IP/OBS DSCHRG MGMT >30: CPT | Performed by: INTERNAL MEDICINE

## 2022-11-04 RX ORDER — CYANOCOBALAMIN 1000 UG/ML
1000 INJECTION, SOLUTION INTRAMUSCULAR; SUBCUTANEOUS
Qty: 10 ML | Refills: 0 | Status: SHIPPED | OUTPATIENT
Start: 2022-11-04

## 2022-11-04 RX ORDER — METOPROLOL SUCCINATE 25 MG/1
25 TABLET, EXTENDED RELEASE ORAL
Qty: 90 TABLET | Refills: 1 | Status: SHIPPED | OUTPATIENT
Start: 2022-11-04 | End: 2023-03-12 | Stop reason: SDUPTHER

## 2022-11-04 RX ORDER — ASPIRIN 81 MG/1
81 TABLET ORAL DAILY
Qty: 30 TABLET | Refills: 1 | Status: SHIPPED | OUTPATIENT
Start: 2022-11-04 | End: 2023-03-12 | Stop reason: SDUPTHER

## 2022-11-04 RX ORDER — LISINOPRIL 5 MG/1
5 TABLET ORAL
Qty: 90 TABLET | Refills: 1 | Status: SHIPPED | OUTPATIENT
Start: 2022-11-04

## 2022-11-04 RX ORDER — CLOPIDOGREL BISULFATE 75 MG/1
75 TABLET ORAL DAILY
Qty: 90 TABLET | Refills: 1 | Status: SHIPPED | OUTPATIENT
Start: 2022-11-04

## 2022-11-04 RX ADMIN — BUSPIRONE HYDROCHLORIDE 5 MG: 5 TABLET ORAL at 08:52

## 2022-11-04 RX ADMIN — CLOPIDOGREL 75 MG: 75 TABLET, FILM COATED ORAL at 08:52

## 2022-11-04 RX ADMIN — ASPIRIN 81 MG: 81 TABLET, COATED ORAL at 08:52

## 2022-11-04 RX ADMIN — BACLOFEN 10 MG: 10 TABLET ORAL at 08:52

## 2022-11-04 RX ADMIN — DESVENLAFAXINE SUCCINATE 50 MG: 50 TABLET, EXTENDED RELEASE ORAL at 08:53

## 2022-11-04 RX ADMIN — Medication 1 APPLICATION: at 08:52

## 2022-11-04 RX ADMIN — METOPROLOL SUCCINATE 25 MG: 25 TABLET, EXTENDED RELEASE ORAL at 08:52

## 2022-11-04 RX ADMIN — ROSUVASTATIN CALCIUM 20 MG: 20 TABLET, FILM COATED ORAL at 08:52

## 2022-11-04 RX ADMIN — BUPROPION HYDROCHLORIDE 300 MG: 150 TABLET, FILM COATED, EXTENDED RELEASE ORAL at 08:52

## 2022-11-04 RX ADMIN — APIXABAN 5 MG: 5 TABLET, FILM COATED ORAL at 08:52

## 2022-11-04 RX ADMIN — LISINOPRIL 5 MG: 5 TABLET ORAL at 08:52

## 2022-11-04 NOTE — TELEPHONE ENCOUNTER
Rx Refill Note  Requested Prescriptions     Pending Prescriptions Disp Refills   • cyanocobalamin 1000 MCG/ML injection 10 mL 0     Sig: Inject 1 mL into the appropriate muscle as directed by prescriber Every 28 (Twenty-Eight) Days.      Last office visit with prescribing clinician: 9/15/2022      Next office visit with prescribing clinician: 11/18/2022            Marissa Masters CMA  11/04/22, 11:17 CDT

## 2022-11-04 NOTE — PAYOR COMM NOTE
"REF:   [id number]    [facility]  SAMUEL,   [phone]  OR  FAX   [phone]      Mark Huddleston Toney (42 y.o. Female)     Date of Birth   1980    Social Security Number       Address   [address] [place] [address]    Home Phone   [phone]    MRN   [id number]       [facility]   [facility], [place]    Marital Status                               Admission Date   11/2/22    Admission Type   Urgent    Admitting Provider   Saturnino Ross DO    Attending Provider       Department, Room/Bed   [facility] 4B, 447/1       Discharge Date   11/4/2022    Discharge Disposition   Home or Self Care    Discharge Destination   Home                            Attending Provider: (none)   Allergies: Penicillins    Isolation: None   Infection: None   Code Status: CPR    Ht: 177.8 cm (70\")   Wt: 98.3 kg (216 lb 12.8 oz)    Admission Cmt: None   Principal Problem: STEMI (ST elevation myocardial infarction) (HCC) [I21.3]                 Active Insurance as of 11/2/2022     Primary Coverage     Payor Plan Insurance Group Employer/Plan Group    [place] [id number]     Payor Plan Address Payor Plan Phone Number Payor Plan Fax Number Effective Dates    [employer] [id number] [phone]  11/1/2021 - None Entered    Formerly [facility] [id number]       Subscriber Name Subscriber Birth Date Member ID       MARK HUDDLESTON TONEY 1980 [id number]                 Emergency Contacts      (Rel.) Home Phone Work Phone Mobile Phone    ARIANEDAVID (Spouse) [phone]    Maria Guadalupe Lora (Sister) -- -- --               Discharge Summary      Rocky Tim MD at 11/04/22 0735               LOS: 2 days   Patient Care Team:  Louisa Cook APRN as PCP - General (Nurse Practitioner)    Chief Complaint:   STEMI (ST elevation myocardial infarction) (HCC)    Peripheral vascular disease (HCC)    ST elevation myocardial " infarction involving right coronary artery (HCC)    Nicotine abuse    Shortness of breath    Subjective  Overnight feels well  No chest pain  No palpitation  No presyncope  No syncope  No orthopnea  No paroxysmal nocturnal dyspnea  Hemodynamically stable  Labs reviewed  No new issues or events  Tolerating current medications well  Satisfactory bowel and bladder activity  Satisfactory oral intake  Resting well   Arteriotomy site is healing well  Wants to be discharged home    Interval History: Improved overall    Denies chest pain currently. Denies excessive shortness of breath. Denies abdominal pain, nausea vomiting or diarrhoea.    Telemetry: no malignant arrhythmia. No significant pauses.    Review of Systems   Constitutional: No chills   No fatigue   No fever.   HENT: Negative.    Eyes: Negative.    Respiratory: Negative for cough,   No chest wall soreness,   Mild shortness of breath,   no wheezing, no stridor.    Cardiovascular: Negative for chest pain,   No palpitations   No significant  leg swelling.   Gastrointestinal: Negative for abdominal distention,  No abdominal pain,   No blood in stool, constipation, diarrhea, nausea or vomiting.   Endocrine: Negative.    Genitourinary: Negative for difficulty urinating, dysuria, flank pain and hematuria.   Musculoskeletal: Negative.    Skin: Negative for rash and wound.   Allergic/Immunologic: Negative.    Neurological: Negative for dizziness, syncope, weakness,   No light-headedness or headaches.   Hematological: Does not bruise/bleed easily.   Psychiatric/Behavioral: Negative for agitation, behavioral problems, confusion, the patient does not appear to be nervous/anxious.       History:   Past Medical History:   Diagnosis Date   • Anemia 2021   • Anxiety    • Asthma    • Clotting disorder ([facility]) 2021   • Deep vein thrombosis (DVT) of left upper extremity ([facility])    • Depression    • PTSD (post-traumatic stress disorder)    • Stroke ([facility]) 2021   • Urinary tract  infection 2018     Past Surgical History:   Procedure Laterality Date   • CARDIAC CATHETERIZATION N/A 2022    Procedure: Left Heart Cath;  Surgeon: Saturnino Ross DO;  Location:  [facility] CATH INVASIVE LOCATION;  Service: Cardiology;  Laterality: N/A;   •  SECTION     • TONSILLECTOMY     • TUBAL ABDOMINAL LIGATION       Social History     Socioeconomic History   • Marital status:    Tobacco Use   • Smoking status: Every Day     Packs/day: 1.00     Years: 15.00     Pack years: 15.00     Types: Cigarettes   • Smokeless tobacco: Never   Vaping Use   • Vaping Use: Never used   Substance and Sexual Activity   • Alcohol use: Not Currently   • Drug use: Not Currently   • Sexual activity: Yes     Partners: Male     Comment:      Family History   Problem Relation Age of Onset   • Heart disease Father    • Asthma Father    • Anxiety disorder Mother    • Depression Mother    • Breast cancer Maternal Grandmother    • Ovarian cancer Neg Hx    • Uterine cancer Neg Hx    • Colon cancer Neg Hx    • Melanoma Neg Hx        Labs:  WBC WBC   Date Value Ref Range Status   2022 10.44 3.40 - 10.80 10*3/mm3 Final   2022 11.07 (H) 3.40 - 10.80 10*3/mm3 Final      HGB Hemoglobin   Date Value Ref Range Status   2022 8.9 (L) 12.0 - 15.9 g/dL Final   2022 8.9 (L) 12.0 - 15.9 g/dL Final      HCT Hematocrit   Date Value Ref Range Status   2022 33.2 (L) 34.0 - 46.6 % Final   2022 32.1 (L) 34.0 - 46.6 % Final      Platlets Platelets   Date Value Ref Range Status   2022 320 140 - 450 10*3/mm3 Final   2022 312 140 - 450 10*3/mm3 Final      MCV MCV   Date Value Ref Range Status   2022 76.3 (L) 79.0 - 97.0 fL Final   2022 75.4 (L) 79.0 - 97.0 fL Final        Results from last 7 days   Lab Units 22  0341   SODIUM mmol/L 139   POTASSIUM mmol/L 3.8   CHLORIDE mmol/L 107   CO2 mmol/L 24.0   BUN mg/dL 4*   CREATININE mg/dL 0.63   CALCIUM mg/dL 8.6   GLUCOSE  mg/dL 113*     Lab Results   Component Value Date    CKTOTAL 53 10/23/2021    TROPONINT <0.010 11/29/2021     PT/INR:    Protime   Date Value Ref Range Status   11/02/2022 15.1 (H) 11.9 - 14.6 Seconds Final   /  INR   Date Value Ref Range Status   11/02/2022 1.24 (H) 0.91 - 1.09 Final       Imaging Results (Last 72 Hours)     ** No results found for the last 72 hours. **          Objective      Allergies   Allergen Reactions   • Penicillins Hives       Medication Review: Performed  Current Facility-Administered Medications   Medication Dose Route Frequency Provider Last Rate Last Admin   • acetaminophen (TYLENOL) tablet 650 mg  650 mg Oral Q4H PRN Saturnino Ross DO   650 mg at 11/03/22 2214   • albuterol sulfate HFA (PROVENTIL HFA;VENTOLIN HFA;PROAIR HFA) inhaler 2 puff  2 puff Inhalation Q4H PRN Saturnino Ross DO       • apixaban (ELIQUIS) tablet 5 mg  5 mg Oral Q12H Saturnino Ross DO   5 mg at 11/03/22 2208   • aspirin EC tablet 81 mg  81 mg Oral Daily Saturnino Ross, DO   81 mg at 11/03/22 0827   • baclofen (LIORESAL) tablet 10 mg  10 mg Oral TID Saturnino Ross, DO   10 mg at 11/03/22 2208   • buPROPion XL (WELLBUTRIN XL) 24 hr tablet 300 mg  300 mg Oral Daily Saturnino Ross DO   300 mg at 11/03/22 0827   • busPIRone (BUSPAR) tablet 5 mg  5 mg Oral TID Saturnino Ross DO   5 mg at 11/03/22 2208   • Chlorhexidine Gluconate Cloth 2 % pads 1 application  1 application Topical Q24H Saturnino Ross DO       • clopidogrel (PLAVIX) tablet 75 mg  75 mg Oral Daily Saturnino Ross DO   75 mg at 11/03/22 0827   • desvenlafaxine (PRISTIQ) 24 hr tablet 50 mg  50 mg Oral Daily Saturnino Ross DO   50 mg at 11/03/22 0826   • hydrOXYzine (ATARAX) tablet 10 mg  10 mg Oral TID PRN Saturnino Ross DO       • lisinopril (PRINIVIL,ZESTRIL) tablet 5 mg  5 mg Oral Q24H Saturnino Ross DO   5 mg at  "11/03/22 1153   • metoprolol succinate XL (TOPROL-XL) 24 hr tablet 25 mg  25 mg Oral Q24H Saturnino Ross DO   25 mg at 11/03/22 1154   • mupirocin (BACTROBAN) 2 % nasal ointment 1 application  1 application Each Nare BID Saturnino Ross DO   1 application at 11/03/22 2214   • nitroglycerin (TRIDIL) 200 mcg/ml infusion  10-50 mcg/min Intravenous Titrated Saturnino Ross DO   Stopped at 11/02/22 2030   • ondansetron (ZOFRAN) tablet 4 mg  4 mg Oral Q6H PRN Saturnino Ross DO        Or   • ondansetron (ZOFRAN) injection 4 mg  4 mg Intravenous Q6H PRN Saturnino Ross DO       • rosuvastatin (CRESTOR) tablet 20 mg  20 mg Oral Daily Saturnino Ross DO   20 mg at 11/03/22 0827   • sodium chloride 0.9 % infusion  100 mL/hr Intravenous Continuous Saturnino Ross  mL/hr at 11/02/22 2334 100 mL/hr at 11/02/22 2334       Vital Sign Min/Max for last 24 hours  Temp  Min: 98 °F (36.7 °C)  Max: 98.7 °F (37.1 °C)   BP  Min: 99/79  Max: 156/100   Pulse  Min: 54  Max: 87   Resp  Min: 20  Max: 20   SpO2  Min: 92 %  Max: 99 %   No data recorded   Weight  Min: 98.3 kg (216 lb 12.8 oz)  Max: 98.3 kg (216 lb 12.8 oz)     Flowsheet Rows    Flowsheet Row First Filed Value   Admission Height 177.8 cm (70\") Documented at 11/02/2022 1000   Admission Weight 84 kg (185 lb 3 oz)  [Simultaneous filing. User may be unaware of other data.] Documented at 11/02/2022 0900          Results for orders placed during the hospital encounter of 11/02/22    Adult Transthoracic Echo Complete W/ Cont if Necessary Per Protocol    Interpretation Summary  •  Left ventricular systolic function is normal. Left ventricular ejection fraction appears to be 61 - 65%.  •  Normal right ventricular cavity size and systolic function noted.  •  No evidence of pulmonary hypertension is present.  •  The following segments are akinetic: mid anterior, apical anterior, apical septal and apex.  •  No " hemodynamically significant valvular disease.        Consults:   Consults     No orders found from 10/4/2022 to 11/3/2022.          Procedures Performed  Procedure(s):  Left Heart Cath       Physical Exam:  General Appearance: Awake, alert, in no acute distress  Eyes: Pupils equal and reactive    Ears: Appear intact with no abnormalities noted  Nose: Nares normal, no drainage  Neck: supple, trachea midline, no carotid bruit and no JVD  Back: no kyphosis present,    Lungs: respirations regular, respirations even and respirations unlabored  Heart: normal S1, S2,  2/6 pansystolic murmur in the left sternal border,  no rub and no click  Abdomen: normal bowel sounds, no masses, no hepatomegaly, no splenomegaly, guarding and no rebound tenderness   Skin: no bleeding, bruising or rash  Extremities: no cyanosis  Psychiatric/Behavioral: Negative for agitation, behavioral problems, confusion, the patient does not appear to be nervous/anxious.     Arteriotomy site healthy,  No bleeding, swelling or excessive bruising. Preserved distal perfusion.      Results Review:   I reviewed the patient's new clinical results.  I reviewed the patient's new imaging results and agree with the interpretation.  I reviewed the patient's other test results and agree with the interpretation  I personally viewed and interpreted the patient's EKG/Telemetry data  Discussed with patient,        Discharge diagnosis with prior    STEMI (ST elevation myocardial infarction) (HCC)    Peripheral vascular disease (HCC)    ST elevation myocardial infarction involving right coronary artery (HCC)    Nicotine abuse  History of vascular thrombosis in her shoulder for which anticoagulate therapy had been prescribed    Hospital Course  Patient is a 42 y.o. female presented for indication as above subsequently underwent evaluation and treatment as below  Patient presented with acute coronary syndrome  Taken emergently for coronary angiography  Findings and  "recommendations as below  Saturnino Ross DO (Primary)       Pre Procedure Diagnosis    STEMI       Procedure Narrative    Date of procedure: 11/2/2022     Procedures performed:     1.  Access to the right femoral artery via modified Seldinger technique  2.  Left heart catheterization with retrograde crosscut aortic valve to the left ventricle where pressures were recorded  3.  Selective bilateral coronary angiography  4.  LV ventriculography  5.  Successful PTCA to the distal LAD with a 2 oh by 8 mm balloon  6.  Conscious sedation with continuous hemodynamic monitoring for 1 hour  7.  Selective right iliofemoral angiography for possible device closure  8.  Patent hemostasis achieved in the right femoral artery access site using a 6 Prydeinig Angio-Seal closure device        Risk, Benefits, and Alternatives discussed with the patient and/or family.  Plan is for moderate sedation and the patient agrees to proceed with the procedure.  An immediate assessment was done prior to the administration of moderate sedation     Indication: STEMI  Premedication: Versed, fentanyl  Contrast: Isovue 361 cc  Radiation: Flouro time= 10 minutes Air Kerma= 682 mGy  Catheters: 6Fr JL4, 6Fr JR4, 6Fr angled pigtail  Guiding catheter: JR4 guide, XB 3.5  PCI Hardware: .014\" BMW wire, 01 4 inch whisper extra-support wire, emerge MR 2 x 8 mm balloon        Procedural details:     The patient was brought to the cath lab and prepped and draped in the usual fashion.  Access was obtained in the right femoral artery via modified Seldinger technique.  A 6 Prydeinig sheath was placed into the artery and flushed.  Next, a JR4 guide catheter was inserted and used to engage the right and selective right coronary angiography was performed in multiple views.  Next, a JL 4 catheter was inserted and used to engage the left and selective left coronary angiography was performed in multiple views.  Next, pigtail catheter was inserted and used to cross " the aortic valve to the left ventricle where pressures were recorded.  LV ventriculography was then performed.  This cath was then withdrawn back cross aortic valve and again pressures were recorded.  Next, a XB 3.5 catheter was inserted and used to engage the left main.  A BMW wire was inserted and advanced into the distal LAD.  A 2 x 8 mm balloon was inserted and advanced into the distal LAD where it was inflated multiple times.  Post intervention angiography was performed.  The wire was exchanged for a whisper extra-support wire which was advanced into the distal LAD.  The 2 x 8 mm balloon was reinserted and reinflated.  Post intervention angiography was performed.  Dr. Carrion and Dr. Quijano reviewed the films.  Everything was then withdrawn to the sheath and the sheath was flushed.  Selective right iliofemoral angiography was performed for possible device closure.  Patent hemostasis was achieved in the right femoral artery access site using a 6 Occitan Angio-Seal closure device.  Patient tolerated the procedure well without any complications.  She left the Cath Lab in stable condition.        I supervised the administration of conscious sedation by nursing staff throughout the case.  First dose was given at 0826 and the end of my face-to-face encounter was at 0926, accounting for a total of 60 minutes of supervision.  During the case, continuous pulse oximetry, heart rate, blood pressure, and patient status were monitored.      Findings:     Hemodynamics:    Aorta: 127/69 mmHg  LV: 145/1 mmHg  LVEDP: 11 mmHg     Left ventriculography:  1. The contractility of the left ventricle is mildly reduced with.  Hypokinesis of the basal segments and hypo-/akinesis of the apical segments.  Estimated ejection fraction 50%.  2. The left ventricle is normal in wall thickness and chamber size.  3. There is a mild gradient across the aortic valve     Selective coronary angiography:      Left main: Left main is a large-caliber  vessel that bifurcates into the LAD and left circumflex coronary arteries, no angiographic evidence of stenosis, MICHELLE-3 flow     LAD: The LAD is a moderate caliber vessel with 30 to 40% stenosis in the proximal segment, minor disease in the mid vessel, the distal vessel is small caliber and has 100% stenosis prior to the vessel wrapping around the apex, MICHELLE-3 flow proximal to the 100% stenosis with MICHELLE 0 flow distal to the 100% stenosis distally. Status post PTCA, we still had 100% stenosis and MICHELLE 0 flow.       Diagonals: The first diagonal is small to moderate caliber with no significant disease, the second diagonal is small caliber with no significant disease     Left circumflex: Left circumflex is a very large, dominant vessel with no angiographic evidence of stenosis, MICHELLE-3 flow     Obtuse marginals: The first obtuse marginal is a moderate caliber vessel with no significant disease, the second obtuse marginal is moderate caliber with no significant disease     PDA/CECY: Moderate caliber with no significant disease     RCA: The right coronary artery is a moderate caliber, nondominant vessel with      Estimated Blood Loss: Minimal     Specimens: None     Complications: None        Impression:  1.  Coronary artery disease as described above with the culprit lesion identified as the distal LAD status post PTCA with 100% stenosis remaining and MICHELLE 0 flow.  This is a small vessel and will therefore be managed medically  2.  Nicotine abuse  3.  History of possible clotting disorder with venoocclusive disease in the remote past followed by Dr. Mares     Plan:   1.  Admit to the ICU and monitor closely  2.  Transthoracic echocardiogram later today to further assess LV function  3.  Recommend full anticoagulation as well as Plavix 75 daily  4.  High intensity statin with Crestor 20  5.  Close follow-up with [facility] cardiology as an outpatient     Saturnino Ross,   Interventional cardiology  [facility]  November 2, 2022           Condition on Discharge: Stable and Improved  ______________________________________________________________________________________________________________________________________________________________________________________________________________________________       Plan on discharge  ______________________________________________________________________________________________________________________________________________________________________________________________________________________________       OK to discharge  Low salt cardiac diet.   Discharge to home and or self care with improvement or resolution of presenting symptoms or complaints    Follow up with primary provider and primary cardiologist as scheduled   Overall improved and deemed fit for discharge  Will be provided with written discharge instructions including diet and medications including prescriptions as required and labs as applicable    Questions were encouraged, asked and answered to the patient's  understanding and satisfaction.  No heavy lifting for 5-7 days greater than 10 pounds.  No heavy or strenuous pushing or pulling.    No tub baths, hot tubs, swimming pools, or submerging groin underwater for 1 week.  Wash groin site daily with antibacterial soap and water. Rinse and keep clean and dry.  Counseled regarding tobacco cessation    No lotions, powders, or topical ointments to site. Keep open to air and dry.  Call our office with any concerns or if you notice redness, swelling, drainage, warmth, or pain at the site.     Continue on current antiplatelet and anticoagulant therapy and  This will be further modified after close follow-up with primary cardiology team within 2 weeks of discharge    Go to nearest emergency room if recurrence of primary complaints or any suspected disabling or life threatening symptoms or complaints such as chest pain, increasing shortness of breath, profound  dizziness, weakness, significant palpitations, passing out  35 minutes spent in the discharge process.  Discussed with patient current admission diagnosis, prognosis, tests performed and their meaning, monitoring for any signs of complications  What to watch for in the short medium and long-term post discharge course  Counseled regarding diet, fluid, sodium restriction, caffeine restriction, stimulant intake and avoidance of nonsteroidal anti-inflammatory drugs  Counseled regarding strict compliance with diet, medications, regular testing and follow-up  _______________________________________________________________________________________________________________________________________________________________________________________________________________________________________    Discharge Disposition: To home and self care  Home or Self Care    Discharge Medications     Discharge Medications      New Medications      Instructions Start Date   clopidogrel 75 MG tablet  Commonly known as: PLAVIX   75 mg, Oral, Daily      lisinopril 5 MG tablet  Commonly known as: PRINIVIL,ZESTRIL   5 mg, Oral, Every 24 Hours Scheduled      metoprolol succinate XL 25 MG 24 hr tablet  Commonly known as: TOPROL-XL   25 mg, Oral, Every 24 Hours Scheduled         Continue These Medications      Instructions Start Date   albuterol sulfate  (90 Base) MCG/ACT inhaler  Commonly known as: PROVENTIL HFA;VENTOLIN HFA;PROAIR HFA   2 puffs, Inhalation, Every 4 Hours PRN      aspirin 81 MG EC tablet   81 mg, Oral, Daily      baclofen 10 MG tablet  Commonly known as: LIORESAL   10 mg, Oral, 3 Times Daily      buPROPion  MG 24 hr tablet  Commonly known as: Wellbutrin XL   300 mg, Oral, Daily      busPIRone 5 MG tablet  Commonly known as: BUSPAR   5 mg, Oral, 3 Times Daily      cyanocobalamin 1000 MCG/ML injection   1,000 mcg, Intramuscular, Every 28 Days      desvenlafaxine 50 MG 24 hr tablet  Commonly known as: Pristiq   50 mg,  Oral, Daily      Eliquis 5 MG tablet tablet  Generic drug: apixaban   5 mg, Oral, Every 12 Hours      hydrOXYzine 10 MG tablet  Commonly known as: ATARAX   10 mg, Oral, 3 Times Daily PRN      levocetirizine 5 MG tablet  Commonly known as: XYZAL   5 mg, Oral, Every Evening      rosuvastatin 5 MG tablet  Commonly known as: Crestor   5 mg, Oral, Daily             Discharge Diet:  Low salt heart healthy cardiac diet    Activity at Discharge:  As tolerated    Follow-up Appointments  Future Appointments   Date Time Provider Department Center   12/2/2022 11:30 AM Lacho Tejada DPM [facility]   6/14/2023  9:30 AM [facility] CT 1 BH [facility] CT [facility]     Additional Instructions for the Follow-ups that You Need to Schedule     Ambulatory Referral to Cardiac Rehab   As directed            Test Results Pending at Discharge: None       Hannah Lance MD  11/04/22  07:44 CDT        Electronically signed by Hannah Lance MD at 11/04/22 0744       Discharge Order (From admission, onward)     Start     Ordered    11/04/22 0740  Discharge patient  Once        Expected Discharge Date: 11/04/22    Expected Discharge Time: Morning    Discharge Disposition: Home or Self Care    Physician of Record for Attribution - Please select from Treatment Team: HANNAH LANCE [[id number]]    Review needed by CMO to determine Physician of Record: No       Question Answer Comment   Physician of Record for Attribution - Please select from Treatment Team HANNAH LANCE    Review needed by CMO to determine Physician of Record No        11/04/22 0731

## 2022-11-04 NOTE — DISCHARGE SUMMARY
LOS: 2 days   Patient Care Team:  Louisa Cook APRN as PCP - General (Nurse Practitioner)    Chief Complaint:   STEMI (ST elevation myocardial infarction) (HCC)    Peripheral vascular disease (HCC)    ST elevation myocardial infarction involving right coronary artery (HCC)    Nicotine abuse    Shortness of breath    Subjective  Overnight feels well  No chest pain  No palpitation  No presyncope  No syncope  No orthopnea  No paroxysmal nocturnal dyspnea  Hemodynamically stable  Labs reviewed  No new issues or events  Tolerating current medications well  Satisfactory bowel and bladder activity  Satisfactory oral intake  Resting well   Arteriotomy site is healing well  Wants to be discharged home    Interval History: Improved overall    Denies chest pain currently. Denies excessive shortness of breath. Denies abdominal pain, nausea vomiting or diarrhoea.    Telemetry: no malignant arrhythmia. No significant pauses.    Review of Systems   Constitutional: No chills   No fatigue   No fever.   HENT: Negative.    Eyes: Negative.    Respiratory: Negative for cough,   No chest wall soreness,   Mild shortness of breath,   no wheezing, no stridor.    Cardiovascular: Negative for chest pain,   No palpitations   No significant  leg swelling.   Gastrointestinal: Negative for abdominal distention,  No abdominal pain,   No blood in stool, constipation, diarrhea, nausea or vomiting.   Endocrine: Negative.    Genitourinary: Negative for difficulty urinating, dysuria, flank pain and hematuria.   Musculoskeletal: Negative.    Skin: Negative for rash and wound.   Allergic/Immunologic: Negative.    Neurological: Negative for dizziness, syncope, weakness,   No light-headedness or headaches.   Hematological: Does not bruise/bleed easily.   Psychiatric/Behavioral: Negative for agitation, behavioral problems, confusion, the patient does not appear to be nervous/anxious.       History:   Past Medical History:   Diagnosis Date    • Anemia    • Anxiety    • Asthma    • Clotting disorder (HCC)    • Deep vein thrombosis (DVT) of left upper extremity (HCC)    • Depression    • PTSD (post-traumatic stress disorder)    • Stroke (HCC)    • Urinary tract infection      Past Surgical History:   Procedure Laterality Date   • CARDIAC CATHETERIZATION N/A 2022    Procedure: Left Heart Cath;  Surgeon: Saturnino Ross DO;  Location: [facility] CATH INVASIVE LOCATION;  Service: Cardiology;  Laterality: N/A;   •  SECTION     • TONSILLECTOMY     • TUBAL ABDOMINAL LIGATION       Social History     Socioeconomic History   • Marital status:    Tobacco Use   • Smoking status: Every Day     Packs/day: 1.00     Years: 15.00     Pack years: 15.00     Types: Cigarettes   • Smokeless tobacco: Never   Vaping Use   • Vaping Use: Never used   Substance and Sexual Activity   • Alcohol use: Not Currently   • Drug use: Not Currently   • Sexual activity: Yes     Partners: Male     Comment:      Family History   Problem Relation Age of Onset   • Heart disease Father    • Asthma Father    • Anxiety disorder Mother    • Depression Mother    • Breast cancer Maternal Grandmother    • Ovarian cancer Neg Hx    • Uterine cancer Neg Hx    • Colon cancer Neg Hx    • Melanoma Neg Hx        Labs:  WBC WBC   Date Value Ref Range Status   2022 10.44 3.40 - 10.80 10*3/mm3 Final   2022 11.07 (H) 3.40 - 10.80 10*3/mm3 Final      HGB Hemoglobin   Date Value Ref Range Status   2022 8.9 (L) 12.0 - 15.9 g/dL Final   2022 8.9 (L) 12.0 - 15.9 g/dL Final      HCT Hematocrit   Date Value Ref Range Status   2022 33.2 (L) 34.0 - 46.6 % Final   2022 32.1 (L) 34.0 - 46.6 % Final      Platlets Platelets   Date Value Ref Range Status   2022 320 140 - 450 10*3/mm3 Final   2022 312 140 - 450 10*3/mm3 Final      MCV MCV   Date Value Ref Range Status   2022 76.3 (L) 79.0 - 97.0 fL Final   2022 75.4  (L) 79.0 - 97.0 fL Final        Results from last 7 days   Lab Units 11/03/22  0341   SODIUM mmol/L 139   POTASSIUM mmol/L 3.8   CHLORIDE mmol/L 107   CO2 mmol/L 24.0   BUN mg/dL 4*   CREATININE mg/dL 0.63   CALCIUM mg/dL 8.6   GLUCOSE mg/dL 113*     Lab Results   Component Value Date    CKTOTAL 53 10/23/2021    TROPONINT <0.010 11/29/2021     PT/INR:    Protime   Date Value Ref Range Status   11/02/2022 15.1 (H) 11.9 - 14.6 Seconds Final   /  INR   Date Value Ref Range Status   11/02/2022 1.24 (H) 0.91 - 1.09 Final       Imaging Results (Last 72 Hours)     ** No results found for the last 72 hours. **          Objective     Allergies   Allergen Reactions   • Penicillins Hives       Medication Review: Performed  Current Facility-Administered Medications   Medication Dose Route Frequency Provider Last Rate Last Admin   • acetaminophen (TYLENOL) tablet 650 mg  650 mg Oral Q4H PRN Saturnino Ross DO   650 mg at 11/03/22 2214   • albuterol sulfate HFA (PROVENTIL HFA;VENTOLIN HFA;PROAIR HFA) inhaler 2 puff  2 puff Inhalation Q4H PRN Saturnino Ross DO       • apixaban (ELIQUIS) tablet 5 mg  5 mg Oral Q12H Saturnino Ross DO   5 mg at 11/03/22 2208   • aspirin EC tablet 81 mg  81 mg Oral Daily Saturnino Ross DO   81 mg at 11/03/22 0827   • baclofen (LIORESAL) tablet 10 mg  10 mg Oral TID Saturnino Ross DO   10 mg at 11/03/22 2208   • buPROPion XL (WELLBUTRIN XL) 24 hr tablet 300 mg  300 mg Oral Daily Saturnino Ross DO   300 mg at 11/03/22 0827   • busPIRone (BUSPAR) tablet 5 mg  5 mg Oral TID Saturnino Ross DO   5 mg at 11/03/22 2208   • Chlorhexidine Gluconate Cloth 2 % pads 1 application  1 application Topical Q24H Saturnino Ross DO       • clopidogrel (PLAVIX) tablet 75 mg  75 mg Oral Daily Saturnino Ross DO   75 mg at 11/03/22 0827   • desvenlafaxine (PRISTIQ) 24 hr tablet 50 mg  50 mg Oral Daily Saturnino Ross  "DO Owen   50 mg at 11/03/22 0826   • hydrOXYzine (ATARAX) tablet 10 mg  10 mg Oral TID PRN Saturnino Ross DO       • lisinopril (PRINIVIL,ZESTRIL) tablet 5 mg  5 mg Oral Q24H Saturnino Ross DO   5 mg at 11/03/22 1153   • metoprolol succinate XL (TOPROL-XL) 24 hr tablet 25 mg  25 mg Oral Q24H Saturnino Ross DO   25 mg at 11/03/22 1154   • mupirocin (BACTROBAN) 2 % nasal ointment 1 application  1 application Each Nare BID Saturnino Ross DO   1 application at 11/03/22 2214   • nitroglycerin (TRIDIL) 200 mcg/ml infusion  10-50 mcg/min Intravenous Titrated Saturnino Ross DO   Stopped at 11/02/22 2030   • ondansetron (ZOFRAN) tablet 4 mg  4 mg Oral Q6H PRN Saturnino Ross DO        Or   • ondansetron (ZOFRAN) injection 4 mg  4 mg Intravenous Q6H PRN Saturnino Ross DO       • rosuvastatin (CRESTOR) tablet 20 mg  20 mg Oral Daily Saturnino Ross DO   20 mg at 11/03/22 0827   • sodium chloride 0.9 % infusion  100 mL/hr Intravenous Continuous Saturnino Ross  mL/hr at 11/02/22 2334 100 mL/hr at 11/02/22 2334       Vital Sign Min/Max for last 24 hours  Temp  Min: 98 °F (36.7 °C)  Max: 98.7 °F (37.1 °C)   BP  Min: 99/79  Max: 156/100   Pulse  Min: 54  Max: 87   Resp  Min: 20  Max: 20   SpO2  Min: 92 %  Max: 99 %   No data recorded   Weight  Min: 98.3 kg (216 lb 12.8 oz)  Max: 98.3 kg (216 lb 12.8 oz)     Flowsheet Rows    Flowsheet Row First Filed Value   Admission Height 177.8 cm (70\") Documented at 11/02/2022 1000   Admission Weight 84 kg (185 lb 3 oz)  [Simultaneous filing. User may be unaware of other data.] Documented at 11/02/2022 0900          Results for orders placed during the hospital encounter of 11/02/22    Adult Transthoracic Echo Complete W/ Cont if Necessary Per Protocol    Interpretation Summary  •  Left ventricular systolic function is normal. Left ventricular ejection fraction appears to be 61 - " 65%.  •  Normal right ventricular cavity size and systolic function noted.  •  No evidence of pulmonary hypertension is present.  •  The following segments are akinetic: mid anterior, apical anterior, apical septal and apex.  •  No hemodynamically significant valvular disease.        Consults:   Consults     No orders found from 10/4/2022 to 11/3/2022.          Procedures Performed  Procedure(s):  Left Heart Cath       Physical Exam:  General Appearance: Awake, alert, in no acute distress  Eyes: Pupils equal and reactive    Ears: Appear intact with no abnormalities noted  Nose: Nares normal, no drainage  Neck: supple, trachea midline, no carotid bruit and no JVD  Back: no kyphosis present,    Lungs: respirations regular, respirations even and respirations unlabored  Heart: normal S1, S2,  2/6 pansystolic murmur in the left sternal border,  no rub and no click  Abdomen: normal bowel sounds, no masses, no hepatomegaly, no splenomegaly, guarding and no rebound tenderness   Skin: no bleeding, bruising or rash  Extremities: no cyanosis  Psychiatric/Behavioral: Negative for agitation, behavioral problems, confusion, the patient does not appear to be nervous/anxious.     Arteriotomy site healthy,  No bleeding, swelling or excessive bruising. Preserved distal perfusion.      Results Review:   I reviewed the patient's new clinical results.  I reviewed the patient's new imaging results and agree with the interpretation.  I reviewed the patient's other test results and agree with the interpretation  I personally viewed and interpreted the patient's EKG/Telemetry data  Discussed with patient,        Discharge diagnosis with prior    STEMI (ST elevation myocardial infarction) (HCC)    Peripheral vascular disease (HCC)    ST elevation myocardial infarction involving right coronary artery (HCC)    Nicotine abuse  History of vascular thrombosis in her shoulder for which anticoagulate therapy had been prescribed    Hospital  "Course  Patient is a 42 y.o. female presented for indication as above subsequently underwent evaluation and treatment as below  Patient presented with acute coronary syndrome  Taken emergently for coronary angiography  Findings and recommendations as below  Saturnino Ross DO (Primary)       Pre Procedure Diagnosis    STEMI       Procedure Narrative    Date of procedure: 11/2/2022     Procedures performed:     1.  Access to the right femoral artery via modified Seldinger technique  2.  Left heart catheterization with retrograde crosscut aortic valve to the left ventricle where pressures were recorded  3.  Selective bilateral coronary angiography  4.  LV ventriculography  5.  Successful PTCA to the distal LAD with a 2 oh by 8 mm balloon  6.  Conscious sedation with continuous hemodynamic monitoring for 1 hour  7.  Selective right iliofemoral angiography for possible device closure  8.  Patent hemostasis achieved in the right femoral artery access site using a 6 [place] Angio-Seal closure device        Risk, Benefits, and Alternatives discussed with the patient and/or family.  Plan is for moderate sedation and the patient agrees to proceed with the procedure.  An immediate assessment was done prior to the administration of moderate sedation     Indication: STEMI  Premedication: Versed, fentanyl  Contrast: Isovue 361 cc  Radiation: Flouro time= 10 minutes Air Kerma= 682 mGy  Catheters: 6Fr JL4, 6Fr JR4, 6Fr angled pigtail  Guiding catheter: JR4 guide, XB 3.5  PCI Hardware: .014\" BMW wire, 01 4 inch whisper extra-support wire, emerge MR 2 x 8 mm balloon        Procedural details:     The patient was brought to the cath lab and prepped and draped in the usual fashion.  Access was obtained in the right femoral artery via modified Seldinger technique.  A 6 Cam[place]onian sheath was placed into the artery and flushed.  Next, a JR4 guide catheter was inserted and used to engage the right and selective right coronary " angiography was performed in multiple views.  Next, a JL 4 catheter was inserted and used to engage the left and selective left coronary angiography was performed in multiple views.  Next, pigtail catheter was inserted and used to cross the aortic valve to the left ventricle where pressures were recorded.  LV ventriculography was then performed.  This cath was then withdrawn back cross aortic valve and again pressures were recorded.  Next, a XB 3.5 catheter was inserted and used to engage the left main.  A BMW wire was inserted and advanced into the distal LAD.  A 2 x 8 mm balloon was inserted and advanced into the distal LAD where it was inflated multiple times.  Post intervention angiography was performed.  The wire was exchanged for a whisper extra-support wire which was advanced into the distal LAD.  The 2 x 8 mm balloon was reinserted and reinflated.  Post intervention angiography was performed.  Dr. Carrion and Dr. Quijano reviewed the films.  Everything was then withdrawn to the sheath and the sheath was flushed.  Selective right iliofemoral angiography was performed for possible device closure.  Patent hemostasis was achieved in the right femoral artery access site using a 6 Albanian Angio-Seal closure device.  Patient tolerated the procedure well without any complications.  She left the Cath Lab in stable condition.        I supervised the administration of conscious sedation by nursing staff throughout the case.  First dose was given at 0826 and the end of my face-to-face encounter was at 0926, accounting for a total of 60 minutes of supervision.  During the case, continuous pulse oximetry, heart rate, blood pressure, and patient status were monitored.      Findings:     Hemodynamics:    Aorta: 127/69 mmHg  LV: 145/1 mmHg  LVEDP: 11 mmHg     Left ventriculography:  1. The contractility of the left ventricle is mildly reduced with.  Hypokinesis of the basal segments and hypo-/akinesis of the apical segments.   Estimated ejection fraction 50%.  2. The left ventricle is normal in wall thickness and chamber size.  3. There is a mild gradient across the aortic valve     Selective coronary angiography:      Left main: Left main is a large-caliber vessel that bifurcates into the LAD and left circumflex coronary arteries, no angiographic evidence of stenosis, MICHELLE-3 flow     LAD: The LAD is a moderate caliber vessel with 30 to 40% stenosis in the proximal segment, minor disease in the mid vessel, the distal vessel is small caliber and has 100% stenosis prior to the vessel wrapping around the apex, MICHELLE-3 flow proximal to the 100% stenosis with MICHELLE 0 flow distal to the 100% stenosis distally. Status post PTCA, we still had 100% stenosis and MICHELLE 0 flow.       Diagonals: The first diagonal is small to moderate caliber with no significant disease, the second diagonal is small caliber with no significant disease     Left circumflex: Left circumflex is a very large, dominant vessel with no angiographic evidence of stenosis, MICHELLE-3 flow     Obtuse marginals: The first obtuse marginal is a moderate caliber vessel with no significant disease, the second obtuse marginal is moderate caliber with no significant disease     PDA/CECY: Moderate caliber with no significant disease     RCA: The right coronary artery is a moderate caliber, nondominant vessel with      Estimated Blood Loss: Minimal     Specimens: None     Complications: None        Impression:  1.  Coronary artery disease as described above with the culprit lesion identified as the distal LAD status post PTCA with 100% stenosis remaining and MICHELLE 0 flow.  This is a small vessel and will therefore be managed medically  2.  Nicotine abuse  3.  History of possible clotting disorder with venoocclusive disease in the remote past followed by Dr. Mares     Plan:   1.  Admit to the ICU and monitor closely  2.  Transthoracic echocardiogram later today to further assess LV function  3.   Recommend full anticoagulation as well as Plavix 75 daily  4.  High intensity statin with Crestor 20  5.  Close follow-up with [facility] as an outpatient     Saturnino M Frossard, DO  Interventional cardiology  [facility]  November 2, 2022           Condition on Discharge: Stable and Improved  ______________________________________________________________________________________________________________________________________________________________________________________________________________________________       Plan on discharge  ______________________________________________________________________________________________________________________________________________________________________________________________________________________________       OK to discharge  Low salt cardiac diet.   Discharge to home and or self care with improvement or resolution of presenting symptoms or complaints    Follow up with primary provider and primary cardiologist as scheduled   Overall improved and deemed fit for discharge  Will be provided with written discharge instructions including diet and medications including prescriptions as required and labs as applicable    Questions were encouraged, asked and answered to the patient's  understanding and satisfaction.  No heavy lifting for 5-7 days greater than 10 pounds.  No heavy or strenuous pushing or pulling.    No tub baths, hot tubs, swimming pools, or submerging groin underwater for 1 week.  Wash groin site daily with antibacterial soap and water. Rinse and keep clean and dry.  Counseled regarding tobacco cessation    No lotions, powders, or topical ointments to site. Keep open to air and dry.  Call our office with any concerns or if you notice redness, swelling, drainage, warmth, or pain at the site.     Continue on current antiplatelet and anticoagulant therapy and  This will be further modified after close follow-up with primary  cardiology team within 2 weeks of discharge    Go to nearest emergency room if recurrence of primary complaints or any suspected disabling or life threatening symptoms or complaints such as chest pain, increasing shortness of breath, profound dizziness, weakness, significant palpitations, passing out  35 minutes spent in the discharge process.  Discussed with patient current admission diagnosis, prognosis, tests performed and their meaning, monitoring for any signs of complications  What to watch for in the short medium and long-term post discharge course  Counseled regarding diet, fluid, sodium restriction, caffeine restriction, stimulant intake and avoidance of nonsteroidal anti-inflammatory drugs  Counseled regarding strict compliance with diet, medications, regular testing and follow-up  _______________________________________________________________________________________________________________________________________________________________________________________________________________________________________    Discharge Disposition: To home and self care  Home or Self Care    Discharge Medications     Discharge Medications      New Medications      Instructions Start Date   clopidogrel 75 MG tablet  Commonly known as: PLAVIX   75 mg, Oral, Daily      lisinopril 5 MG tablet  Commonly known as: PRINIVIL,ZESTRIL   5 mg, Oral, Every 24 Hours Scheduled      metoprolol succinate XL 25 MG 24 hr tablet  Commonly known as: TOPROL-XL   25 mg, Oral, Every 24 Hours Scheduled         Continue These Medications      Instructions Start Date   albuterol sulfate  (90 Base) MCG/ACT inhaler  Commonly known as: PROVENTIL HFA;VENTOLIN HFA;PROAIR HFA   2 puffs, Inhalation, Every 4 Hours PRN      aspirin 81 MG EC tablet   81 mg, Oral, Daily      baclofen 10 MG tablet  Commonly known as: LIORESAL   10 mg, Oral, 3 Times Daily      buPROPion  MG 24 hr tablet  Commonly known as: Wellbutrin XL   300 mg, Oral, Daily       busPIRone 5 MG tablet  Commonly known as: BUSPAR   5 mg, Oral, 3 Times Daily      cyanocobalamin 1000 MCG/ML injection   1,000 mcg, Intramuscular, Every 28 Days      desvenlafaxine 50 MG 24 hr tablet  Commonly known as: Pristiq   50 mg, Oral, Daily      Eliquis 5 MG tablet tablet  Generic drug: apixaban   5 mg, Oral, Every 12 Hours      hydrOXYzine 10 MG tablet  Commonly known as: ATARAX   10 mg, Oral, 3 Times Daily PRN      levocetirizine 5 MG tablet  Commonly known as: XYZAL   5 mg, Oral, Every Evening      rosuvastatin 5 MG tablet  Commonly known as: Crestor   5 mg, Oral, Daily             Discharge Diet:  Low salt heart healthy cardiac diet    Activity at Discharge:  As tolerated    Follow-up Appointments  Future Appointments   Date Time Provider Department Center   12/2/2022 11:30 AM Lacho Tejada DPM [facility] POD [facility]   6/14/2023  9:30 AM PAD [facility] CT 1 BH PAD CT BI PAD     Additional Instructions for the Follow-ups that You Need to Schedule     Ambulatory Referral to Cardiac Rehab   As directed            Test Results Pending at Discharge: None       Rocky Tim MD  11/04/22  07:44 CDT

## 2022-11-04 NOTE — PLAN OF CARE
Problem: Adult Inpatient Plan of Care  Goal: Plan of Care Review  Outcome: Ongoing, Progressing  Flowsheets (Taken 11/4/2022 0403)  Progress: improving  Plan of Care Reviewed With: patient  Outcome Evaluation: Pt c/o R groin soreness, PRN PO Tylenol given with good relief. Pt resting throughout the shift. SB/S HR: 56-66 on tele. VSS. R groin dressing removed. Site EDEL, dry and intact. Soreness noted. Possible discharge home soon. Safety maintained.

## 2022-11-05 NOTE — OUTREACH NOTE
Prep Survey    Flowsheet Row Responses   [facility] patient discharged from? [place]   Is LACE score < 7 ? No   Emergency Room discharge w/ pulse ox? No   Eligibility [facility]   Date of Admission 11/02/22   Date of Discharge 11/04/22   Discharge diagnosis STEMI (ST elevation myocardial infarction)   Does the patient have one of the following disease processes/diagnoses(primary or secondary)? Acute MI (STEMI,NSTEMI)   Does the patient have Home health ordered? No   Is there a DME ordered? No   Prep survey completed? Yes          BARB CARNES - Registered Nurse

## 2022-11-06 LAB
QT INTERVAL: 398 MS
QT INTERVAL: 420 MS
QTC INTERVAL: 453 MS
QTC INTERVAL: 462 MS

## 2022-11-07 ENCOUNTER — TRANSITIONAL CARE MANAGEMENT TELEPHONE ENCOUNTER (OUTPATIENT)
Dept: CALL CENTER | Facility: HOSPITAL | Age: 42
End: 2022-11-07

## 2022-11-07 NOTE — OUTREACH NOTE
Call Center TCM Note    Flowsheet Row Responses   [facility], [place] patient discharged from? [facility]   Does the patient have one of the following disease processes/diagnoses(primary or secondary)? Acute MI (STEMI,NSTEMI)   TCM attempt successful? Yes  [Verbal release on file. Spouse or Sister]   Call start time 1236   Call end time 1239   Discharge diagnosis STEMI (ST elevation myocardial infarction)   Meds reviewed with patient/caregiver? Yes   Is the patient having any side effects they believe may be caused by any medication additions or changes? No   Does the patient have all prescriptions related to this admission filled (includes statins,anticoagulants,HTN meds,anti-arrhythmia meds) Yes   Is the patient taking all medications as directed (includes completed medication regime)? Yes   Comments Hospital d/c follow up 11/18/22@1115   Does the patient have an appointment with their PCP within 7 days of discharge? Yes  [Cardiology follow up 11/14/22]   Has home health visited the patient within 72 hours of discharge? N/A   Psychosocial issues? No   Did the patient receive a copy of their discharge instructions? Yes   Nursing interventions Reviewed instructions with patient   What is the patient's perception of their health status since discharge? Improving   Nursing interventions Nurse provided patient education   Is the patient/caregiver able to teach back signs and symptoms of when to call for help immediately: Sudden chest discomfort, Shortness of breath at any time, Nausea or vomiting, Irregular or rapid heart rate, Sudden discomfort in arms, back, neck or jaw, Sudden sweating or clammy skin, Dizziness or lightheadedness   Nursing interventions Nurse provided patient education   Is the pateint /caregiver able to teach back the importance of cardiac rehab? Yes   Nursing interventions Provided education on importance of cardiac rehab   Is the patient/caregiver able to teach back lifestyle changes to help prevent  MIs Quit smoking, Heart healthy diet, Maintaining a healthy weight, Regular exercise as approved by provider   Is the patient/caregiver able to teach back ways to prevent a second heart attack: Take medications, Follow up with MD, Manage risk factors   If the patient is a current smoker, are they able to teach back resources for cessation? Smoking cessation medications   Is the patient/caregiver able to teach back the hierarchy of who to call/visit for symptoms/problems? PCP, Specialist, Home health nurse, Urgent Care, ED, 911 Yes   TCM call completed? Yes   Call end time 1239   Would this patient benefit from a Referral to Amb Social Work? No   Is the patient interested in additional calls from an ambulatory ?  NOTE:  applies to high risk patients requiring additional follow-up. No          Roselyn Partida RN    11/7/2022, 12:40 CST

## 2022-11-08 ENCOUNTER — HOSPITAL ENCOUNTER (EMERGENCY)
Facility: HOSPITAL | Age: 42
Discharge: LEFT BEFORE DISCHARGE | End: 2022-11-08
Attending: FAMILY MEDICINE | Admitting: FAMILY MEDICINE

## 2022-11-08 ENCOUNTER — APPOINTMENT (OUTPATIENT)
Dept: ULTRASOUND IMAGING | Facility: HOSPITAL | Age: 42
End: 2022-11-08

## 2022-11-08 VITALS
OXYGEN SATURATION: 100 % | DIASTOLIC BLOOD PRESSURE: 99 MMHG | RESPIRATION RATE: 16 BRPM | HEIGHT: 69 IN | SYSTOLIC BLOOD PRESSURE: 135 MMHG | WEIGHT: 180 LBS | BODY MASS INDEX: 26.66 KG/M2 | TEMPERATURE: 97.8 F | HEART RATE: 68 BPM

## 2022-11-08 DIAGNOSIS — D64.9 ANEMIA, UNSPECIFIED TYPE: ICD-10-CM

## 2022-11-08 DIAGNOSIS — N92.1 MENORRHAGIA WITH IRREGULAR CYCLE: Primary | ICD-10-CM

## 2022-11-08 LAB
ABO GROUP BLD: NORMAL
ALBUMIN SERPL-MCNC: 3.9 G/DL (ref 3.5–5.2)
ALBUMIN/GLOB SERPL: 1.7 G/DL
ALP SERPL-CCNC: 61 U/L (ref 39–117)
ALT SERPL W P-5'-P-CCNC: 6 U/L (ref 1–33)
ANION GAP SERPL CALCULATED.3IONS-SCNC: 6 MMOL/L (ref 5–15)
APTT PPP: 30.4 SECONDS (ref 24.1–35)
AST SERPL-CCNC: 13 U/L (ref 1–32)
BASOPHILS # BLD AUTO: 0.13 10*3/MM3 (ref 0–0.2)
BASOPHILS NFR BLD AUTO: 1.4 % (ref 0–1.5)
BILIRUB SERPL-MCNC: 0.2 MG/DL (ref 0–1.2)
BLD GP AB SCN SERPL QL: NEGATIVE
BUN SERPL-MCNC: 11 MG/DL (ref 6–20)
BUN/CREAT SERPL: 13.4 (ref 7–25)
CALCIUM SPEC-SCNC: 9.1 MG/DL (ref 8.6–10.5)
CHLORIDE SERPL-SCNC: 107 MMOL/L (ref 98–107)
CO2 SERPL-SCNC: 26 MMOL/L (ref 22–29)
CREAT SERPL-MCNC: 0.82 MG/DL (ref 0.57–1)
DEPRECATED RDW RBC AUTO: 51.1 FL (ref 37–54)
EGFRCR SERPLBLD CKD-EPI 2021: 91.7 ML/MIN/1.73
EOSINOPHIL # BLD AUTO: 0.4 10*3/MM3 (ref 0–0.4)
EOSINOPHIL NFR BLD AUTO: 4.2 % (ref 0.3–6.2)
ERYTHROCYTE [DISTWIDTH] IN BLOOD BY AUTOMATED COUNT: 18.2 % (ref 12.3–15.4)
GLOBULIN UR ELPH-MCNC: 2.3 GM/DL
GLUCOSE SERPL-MCNC: 135 MG/DL (ref 65–99)
HCT VFR BLD AUTO: 29 % (ref 34–46.6)
HGB BLD-MCNC: 7.7 G/DL (ref 12–15.9)
HOLD SPECIMEN: NORMAL
IMM GRANULOCYTES # BLD AUTO: 0.03 10*3/MM3 (ref 0–0.05)
IMM GRANULOCYTES NFR BLD AUTO: 0.3 % (ref 0–0.5)
INR PPP: 1.28 (ref 0.91–1.09)
LYMPHOCYTES # BLD AUTO: 1.99 10*3/MM3 (ref 0.7–3.1)
LYMPHOCYTES NFR BLD AUTO: 21 % (ref 19.6–45.3)
MCH RBC QN AUTO: 20.5 PG (ref 26.6–33)
MCHC RBC AUTO-ENTMCNC: 26.6 G/DL (ref 31.5–35.7)
MCV RBC AUTO: 77.3 FL (ref 79–97)
MONOCYTES # BLD AUTO: 0.65 10*3/MM3 (ref 0.1–0.9)
MONOCYTES NFR BLD AUTO: 6.8 % (ref 5–12)
NEUTROPHILS NFR BLD AUTO: 6.29 10*3/MM3 (ref 1.7–7)
NEUTROPHILS NFR BLD AUTO: 66.3 % (ref 42.7–76)
NRBC BLD AUTO-RTO: 0 /100 WBC (ref 0–0.2)
PLATELET # BLD AUTO: 437 10*3/MM3 (ref 140–450)
PMV BLD AUTO: 10.5 FL (ref 6–12)
POTASSIUM SERPL-SCNC: 3.9 MMOL/L (ref 3.5–5.2)
PROT SERPL-MCNC: 6.2 G/DL (ref 6–8.5)
PROTHROMBIN TIME: 15.5 SECONDS (ref 11.9–14.6)
RBC # BLD AUTO: 3.75 10*6/MM3 (ref 3.77–5.28)
RH BLD: NEGATIVE
SODIUM SERPL-SCNC: 139 MMOL/L (ref 136–145)
T&S EXPIRATION DATE: NORMAL
WBC NRBC COR # BLD: 9.49 10*3/MM3 (ref 3.4–10.8)
WHOLE BLOOD HOLD COAG: NORMAL
WHOLE BLOOD HOLD SPECIMEN: NORMAL

## 2022-11-08 PROCEDURE — 96374 THER/PROPH/DIAG INJ IV PUSH: CPT

## 2022-11-08 PROCEDURE — 85025 COMPLETE CBC W/AUTO DIFF WBC: CPT | Performed by: FAMILY MEDICINE

## 2022-11-08 PROCEDURE — 80053 COMPREHEN METABOLIC PANEL: CPT | Performed by: FAMILY MEDICINE

## 2022-11-08 PROCEDURE — 99283 EMERGENCY DEPT VISIT LOW MDM: CPT

## 2022-11-08 PROCEDURE — 25010000002 ESTROGENS CONJUGATED PER 25 MG: Performed by: FAMILY MEDICINE

## 2022-11-08 PROCEDURE — 86901 BLOOD TYPING SEROLOGIC RH(D): CPT | Performed by: FAMILY MEDICINE

## 2022-11-08 PROCEDURE — 85610 PROTHROMBIN TIME: CPT | Performed by: FAMILY MEDICINE

## 2022-11-08 PROCEDURE — 86850 RBC ANTIBODY SCREEN: CPT | Performed by: FAMILY MEDICINE

## 2022-11-08 PROCEDURE — 86900 BLOOD TYPING SEROLOGIC ABO: CPT | Performed by: FAMILY MEDICINE

## 2022-11-08 PROCEDURE — 85730 THROMBOPLASTIN TIME PARTIAL: CPT | Performed by: FAMILY MEDICINE

## 2022-11-08 PROCEDURE — 76856 US EXAM PELVIC COMPLETE: CPT

## 2022-11-08 RX ADMIN — CONJUGATED ESTROGENS 25 MG: 25 INJECTION, POWDER, LYOPHILIZED, FOR SOLUTION INTRAMUSCULAR; INTRAVENOUS at 10:38

## 2022-11-08 NOTE — ED PROVIDER NOTES
Subjective   History of Present Illness  This patient is a 42-year-old female with a history of menorrhagia.  Approximately a week ago she had a STEMI and got balloon angioplasty.  As part of the post angioplasty treatment she was started on Plavix and aspirin.  She had been taking Eliquis intermittently for a pulmonary embolus she developed last year.  Since the STEMI she has been taking it every day.  She presents today because of a weeks worth history of very heavy periods.  She states states that she is changing a pad every hour and that it soaked.  She has had some mild fatigue but no chest pain or dustin shortness of breath.        Review of Systems   Genitourinary: Positive for vaginal bleeding.   All other systems reviewed and are negative.      Past Medical History:   Diagnosis Date   • Anemia    • Anxiety    • Asthma    • Clotting disorder (HCC)    • Deep vein thrombosis (DVT) of left upper extremity ([facility])    • Depression    • PTSD (post-traumatic stress disorder)    • Stroke ([facility])    • Urinary tract infection 2018       Allergies   Allergen Reactions   • Penicillins Hives       Past Surgical History:   Procedure Laterality Date   • CARDIAC CATHETERIZATION N/A 2022    Procedure: Left Heart Cath;  Surgeon: Saturnino Ross DO;  Location:  [facility] CATH INVASIVE LOCATION;  Service: Cardiology;  Laterality: N/A;   •  SECTION     • TONSILLECTOMY     • TUBAL ABDOMINAL LIGATION         Family History   Problem Relation Age of Onset   • Heart disease Father    • Asthma Father    • Anxiety disorder Mother    • Depression Mother    • Breast cancer Maternal Grandmother    • Ovarian cancer Neg Hx    • Uterine cancer Neg Hx    • Colon cancer Neg Hx    • Melanoma Neg Hx        Social History     Socioeconomic History   • Marital status:    Tobacco Use   • Smoking status: Every Day     Packs/day: 1.00     Years: 15.00     Pack years: 15.00     Types: Cigarettes   • Smokeless  tobacco: Never   Vaping Use   • Vaping Use: Never used   Substance and Sexual Activity   • Alcohol use: Not Currently   • Drug use: Not Currently   • Sexual activity: Yes     Partners: Male     Comment:            Objective   Physical Exam  Vitals and nursing note reviewed.   Constitutional:       Appearance: Normal appearance. She is well-developed. She is obese.   HENT:      Head: Normocephalic and atraumatic.      Right Ear: External ear normal.      Left Ear: External ear normal.      Nose: Nose normal.      Mouth/Throat:      Mouth: Mucous membranes are moist.      Pharynx: Oropharynx is clear.   Eyes:      Extraocular Movements: Extraocular movements intact.      Conjunctiva/sclera: Conjunctivae normal.   Cardiovascular:      Rate and Rhythm: Normal rate and regular rhythm.      Pulses: Normal pulses.      Heart sounds: Normal heart sounds.   Pulmonary:      Effort: Pulmonary effort is normal.      Breath sounds: Normal breath sounds.   Abdominal:      General: Bowel sounds are normal.      Palpations: Abdomen is soft.   Musculoskeletal:         General: Normal range of motion.      Cervical back: Normal range of motion and neck supple.   Skin:     General: Skin is warm and dry.      Capillary Refill: Capillary refill takes less than 2 seconds.   Neurological:      General: No focal deficit present.      Mental Status: She is alert and oriented to person, place, and time.   Psychiatric:         Behavior: Behavior normal.         Thought Content: Thought content normal.         Judgment: Judgment normal.         Procedures           ED Course                                           MDM  Number of Diagnoses or Management Options     Amount and/or Complexity of Data Reviewed  Clinical lab tests: reviewed and ordered  Tests in the radiology section of CPT®: reviewed and ordered  Decide to obtain previous medical records or to obtain history from someone other than the patient: yes    Patient  Progress  Patient progress: stable      Final diagnoses:   Menorrhagia with irregular cycle   Anemia, unspecified type       ED Disposition  ED Disposition     ED Disposition   Eloped    Condition   --    Comment   --             Ruth Medel DO  [address]  [address]  [place] [id number] [phone]               Medication List      No changes were made to your prescriptions during this visit.       The patient's work-up showed a hemoglobin of 7.7.  I discussed the case with cardiology and they are okay with her stopping the Eliquis as she is actively bleeding.  She does need to continue taking the dual antiplatelets that she is on.  I discussed the case with gynecology and they advised IV Premarin in the ED and follow-up with Dr. Medel as an outpatient.    The patient actually eloped.  She still had her IV.  The nurse called her and the patient stated that she pulled the IV herself and that is bleeding everywhere.  The nurse informed her that she needs to come back for that to be checked.     Romario Fischer MD  11/08/22 0345

## 2022-11-14 ENCOUNTER — READMISSION MANAGEMENT (OUTPATIENT)
Dept: CALL CENTER | Facility: HOSPITAL | Age: 42
End: 2022-11-14

## 2022-11-14 NOTE — OUTREACH NOTE
AMI Week 2 Survey    Flowsheet Row Responses   [facility] patient discharged from? [place]   Does the patient have one of the following disease processes/diagnoses(primary or secondary)? Acute MI (STEMI,NSTEMI)   Week 2 attempt successful? No   Unsuccessful attempts Attempt 1          LE ACOSTA - Licensed Nurse

## 2022-11-17 ENCOUNTER — READMISSION MANAGEMENT (OUTPATIENT)
Dept: CALL CENTER | Facility: HOSPITAL | Age: 42
End: 2022-11-17

## 2022-11-17 NOTE — OUTREACH NOTE
AMI Week 2 Survey    Flowsheet Row Responses   [facility] patient discharged from? [place]   Does the patient have one of the following disease processes/diagnoses(primary or secondary)? Acute MI (STEMI,NSTEMI)   Week 2 attempt successful? No   Unsuccessful attempts Attempt 2          NATE PENA - Registered Nurse

## 2023-01-09 ENCOUNTER — OFFICE VISIT (OUTPATIENT)
Dept: INTERNAL MEDICINE | Facility: CLINIC | Age: 43
End: 2023-01-09
Payer: MEDICAID

## 2023-01-09 VITALS
HEIGHT: 69 IN | TEMPERATURE: 97.5 F | DIASTOLIC BLOOD PRESSURE: 81 MMHG | HEART RATE: 60 BPM | BODY MASS INDEX: 30.96 KG/M2 | RESPIRATION RATE: 16 BRPM | WEIGHT: 209 LBS | SYSTOLIC BLOOD PRESSURE: 123 MMHG | OXYGEN SATURATION: 98 %

## 2023-01-09 DIAGNOSIS — F41.9 ANXIETY: ICD-10-CM

## 2023-01-09 DIAGNOSIS — F33.1 MODERATE EPISODE OF RECURRENT MAJOR DEPRESSIVE DISORDER: Primary | ICD-10-CM

## 2023-01-09 DIAGNOSIS — I25.10 CORONARY ARTERY DISEASE INVOLVING NATIVE CORONARY ARTERY OF NATIVE HEART WITHOUT ANGINA PECTORIS: ICD-10-CM

## 2023-01-09 PROCEDURE — 99214 OFFICE O/P EST MOD 30 MIN: CPT | Performed by: NURSE PRACTITIONER

## 2023-01-09 RX ORDER — ONDANSETRON 8 MG/1
TABLET, ORALLY DISINTEGRATING ORAL
COMMUNITY

## 2023-01-09 RX ORDER — LEVOCETIRIZINE DIHYDROCHLORIDE 5 MG/1
5 TABLET, FILM COATED ORAL EVERY EVENING
Qty: 30 TABLET | Refills: 2 | Status: SHIPPED | OUTPATIENT
Start: 2023-01-09 | End: 2023-03-12 | Stop reason: SDUPTHER

## 2023-01-09 RX ORDER — TICAGRELOR 90 MG/1
90 TABLET ORAL 2 TIMES DAILY
COMMUNITY
Start: 2023-01-02

## 2023-01-09 RX ORDER — ROSUVASTATIN CALCIUM 20 MG/1
TABLET, COATED ORAL
COMMUNITY

## 2023-01-09 RX ORDER — NALOXONE HYDROCHLORIDE 4 MG/.1ML
SPRAY NASAL
COMMUNITY
Start: 2022-11-16

## 2023-01-09 RX ORDER — NITROGLYCERIN 0.4 MG/1
TABLET SUBLINGUAL
COMMUNITY
End: 2023-01-09 | Stop reason: SDUPTHER

## 2023-01-09 RX ORDER — DOCUSATE SODIUM 100 MG/1
CAPSULE, LIQUID FILLED ORAL
COMMUNITY
Start: 2022-11-16

## 2023-01-09 RX ORDER — LISINOPRIL 5 MG/1
TABLET ORAL
COMMUNITY
End: 2023-03-12 | Stop reason: SDUPTHER

## 2023-01-09 RX ORDER — NITROGLYCERIN 0.4 MG/1
TABLET SUBLINGUAL
Qty: 25 TABLET | Refills: 0 | Status: SHIPPED | OUTPATIENT
Start: 2023-01-09

## 2023-01-09 RX ORDER — OXYCODONE HYDROCHLORIDE AND ACETAMINOPHEN 5; 325 MG/1; MG/1
TABLET ORAL SEE ADMIN INSTRUCTIONS
COMMUNITY
Start: 2022-11-16

## 2023-01-09 NOTE — PROGRESS NOTES
Subjective      Chief Complaint   Patient presents with   • Hospital Follow Up Visit       History of Present Illness  The patient presents today for a follow-up.     The patient was last seen in 09/2022 for a foot problem. She had a hysterectomy performed by Dr. Humphrey at the end of 11/2022. She was admitted to [facility] on 11/02/2022 for a heart problem. She states that she had a heart catheterization performed, but she does not think that the physician did anything. She reports that her sister was told that he ballooned out the artery. She states that on 12/31/2022, she went back to [facility] ER for chest pain. She reports that they told her that her troponin was elevated to 1000. She states that it went from 70. She was transformed  to [facility], and had a stent placed. She states that she stayed in the hospital for 3 days after the stent was placed. She reports that she needs some of her medications changed. She states that she is going to cardiac rehab. She reports that she has an appointment next week at [facility]. She states that she is currently taking Brilinta and aspirin. She reports that she is also taking Plavix. She states that she bruises easily. She reports that she was given the clopidogrel bottles in the hospital recently. She reports that she was only suppose to take the aspirin and Brilinta. She reports that when she goes to bed, she feels like her body is crushing itself. She states that she was unsure if it was from COVID-19. She reports that she was off the blood thinners for a while and it went away. She states that now since she is back on the blood thinners, she is in severe pain. She reports that she has an appointment scheduled with Dr. Chi on 01/12/2023. She reports that she has been on Eliquis in the past. She states that her Crestor dosage was increased from 5 mg to 20 mg. She reports that she was told to quit smoking.  She states that she has cut down. She reports that she is smoking a pack every 3 days.    The patient reports that she does not think the Wellbutrin and her anxiety medications are working. She reports that she has been hateful to everyone. She states that she is taking Pristiq 50 mg, Wellbutrin 300 mg, and BuSpar 1 tablet twice daily. She states that she feels like she can not focus on one thing. She reports that she will be in her kitchen and go to her bedroom for something and forget what she went in there for. She reports that it took her 2 days to get this many pills because she has not been able to concentrate or think clearly. She reports that she feels like she is getting dementia. She states that her anxiety has worsen. She states that one day she went to [facility] when she sat in the parking lot for 45 minutes, and she decided to go back home. She reports that she takes hydroxyzine 10 mg 3 times daily as needed. She states that she takes lisinopril, rosuvastatin, metoprolol, and Pristiq. She reports that she has had a Dittit test performed.      The patient is currently taking lisinopril for hypertension.    Patient's PMR from outside medical facility reviewed and noted.    Review of Systems   Otherwise complete ROS reviewed and negative except as mentioned in the HPI.    Past Medical History:   Past Medical History:   Diagnosis Date   • Anemia 2021   • Anxiety    • Asthma    • Clotting disorder (HCC) 2021   • Deep vein thrombosis (DVT) of left upper extremity (HCC)    • Depression    • Hypertension 11/23   • Myocardial infarction (HCC) 11/22   • PTSD (post-traumatic stress disorder)    • Stroke (HCC) 2021   • Urinary tract infection 2018     Past Surgical History:  Past Surgical History:   Procedure Laterality Date   • CARDIAC CATHETERIZATION N/A 11/02/2022    Procedure: Left Heart Cath;  Surgeon: Saturnino Ross DO;  Location:  [facility] CATH INVASIVE LOCATION;  Service: Cardiology;  Laterality:  N/A;   •  SECTION     • CORONARY STENT PLACEMENT  22   • HYSTERECTOMY     • TONSILLECTOMY     • TUBAL ABDOMINAL LIGATION       Social History:  reports that she has been smoking cigarettes. She has a 15.00 pack-year smoking history. She has never used smokeless tobacco. She reports that she does not currently use alcohol. She reports that she does not currently use drugs.    Family History: family history includes Anxiety disorder in her mother; Asthma in her father; Breast cancer in her maternal grandmother; Depression in her mother; Heart disease in her father.      Allergies:  Allergies   Allergen Reactions   • Penicillins Hives     Medications:  Prior to Admission medications    Medication Sig Start Date End Date Taking? Authorizing Provider   albuterol sulfate  (90 Base) MCG/ACT inhaler Inhale 2 puffs Every 4 (Four) Hours As Needed for Wheezing or Shortness of Air. 22  Yes Louisa Cook APRN   aspirin 81 MG EC tablet Take 1 tablet by mouth Daily. 22  Yes Rocky Tim MD   baclofen (LIORESAL) 10 MG tablet Take 1 tablet by mouth 3 (Three) Times a Day. 22  Yes Louisa Cook APRN   Brilinta 90 MG tablet tablet Take 90 mg by mouth 2 (Two) Times a Day. 23  Yes Provider, MD Luis   buPROPion XL (Wellbutrin XL) 300 MG 24 hr tablet Take 1 tablet by mouth Daily. 9/15/22  Yes Louisa Cook APRN   busPIRone (BUSPAR) 5 MG tablet Take 1 tablet by mouth 3 (Three) Times a Day. 22  Yes Louisa Cook APRN   clopidogrel (PLAVIX) 75 MG tablet Take 1 tablet by mouth Daily. 22  Yes Rocky Tim MD   cyanocobalamin 1000 MCG/ML injection Inject 1 mL into the appropriate muscle as directed by prescriber Every 28 (Twenty-Eight) Days. 22  Yes Louisa Cook APRN   desvenlafaxine (Pristiq) 50 MG 24 hr tablet Take 1 tablet by mouth Daily. 22  Yes Louisa Cook APRN   levocetirizine (XYZAL) 5 MG tablet  Take 1 tablet by mouth Every Evening.   Yes Luis Dorsey MD   lisinopril (PRINIVIL,ZESTRIL) 5 MG tablet Take 1 tablet by mouth Daily. 11/4/22  Yes Rocky Tim MD   metoprolol succinate XL (TOPROL-XL) 25 MG 24 hr tablet Take 1 tablet by mouth Daily. 11/4/22  Yes Rocky Tim MD   naloxone (NARCAN) 4 MG/0.1ML nasal spray ADMINISTER A SINGLE SPRAY IN ONE NOSTRIL UPON SIGNS OF OPIOID OVERDOSE. CALL 911. REPEAT AFTER 3 MINUTES IF NO RESPONSE. 11/16/22  Yes Luis Dosrey MD   nitroglycerin (NITROSTAT) 0.4 MG SL tablet nitroglycerin 0.4 mg sublingual tablet   Yes Luis Dorsey MD   rosuvastatin (CRESTOR) 20 MG tablet rosuvastatin 20 mg tablet   Yes Luis Dorsey MD   docusate sodium (COLACE) 100 MG capsule TAKE 1 CAPSULE BY MOUTH TWICE DAILY FOR CONSTIPATION 11/16/22   Luis Dorsey MD   Eliquis 5 MG tablet tablet Take 5 mg by mouth Every 12 (Twelve) Hours. 2/21/22   Luis Dorsey MD   hydrOXYzine (ATARAX) 10 MG tablet Take 1 tablet by mouth 3 (Three) Times a Day As Needed for Anxiety. 5/4/22   Yuki Iniguez APRN   lisinopril (PRINIVIL,ZESTRIL) 5 MG tablet lisinopril 5 mg tablet   Take 1 tablet every day by oral route.    Luis Dorsey MD   ondansetron ODT (ZOFRAN-ODT) 8 MG disintegrating tablet ondansetron 8 mg disintegrating tablet    Luis Dorsey MD   oxyCODONE-acetaminophen (PERCOCET) 5-325 MG per tablet Take  by mouth See Admin Instructions. Take 1 tablet by mouth every 4-6 hours as needed for pain 11/16/22   Luis Dorsey MD   rosuvastatin (Crestor) 5 MG tablet Take 1 tablet by mouth Daily. 10/26/21   Den Agarwal MD       Objective     Vital Signs: /81   Pulse 60   Temp 97.5 °F (36.4 °C)   Resp 16   Ht 175.3 cm (69\")   Wt 94.8 kg (209 lb)   SpO2 98%   BMI 30.86 kg/m²   Physical Exam  Vitals reviewed.   Constitutional:       Appearance: She is well-developed.   HENT:      Head: Normocephalic and atraumatic.   Eyes:       Pupils: Pupils are equal, round, and reactive to light.   Neck:      Vascular: No JVD.   Cardiovascular:      Rate and Rhythm: Normal rate and regular rhythm.   Pulmonary:      Effort: Pulmonary effort is normal.   Abdominal:      General: Bowel sounds are normal.      Palpations: Abdomen is soft.   Musculoskeletal:         General: No deformity.      Cervical back: Normal range of motion and neck supple.   Lymphadenopathy:      Cervical: No cervical adenopathy.   Skin:     General: Skin is warm and dry.   Neurological:      Mental Status: She is alert and oriented to person, place, and time.   Psychiatric:         Behavior: Behavior normal.         Thought Content: Thought content normal.         Judgment: Judgment normal.      Comments: Patient very tearful and anxious during visit.          BMI is >= 30 and <35. (Class 1 Obesity). The following options were offered after discussion;: nutrition counseling/recommendations      Results Reviewed:  Glucose   Date Value Ref Range Status   11/08/2022 135 (H) 65 - 99 mg/dL Final     BUN   Date Value Ref Range Status   11/08/2022 11 6 - 20 mg/dL Final     Creatinine   Date Value Ref Range Status   11/08/2022 0.82 0.57 - 1.00 mg/dL Final     Sodium   Date Value Ref Range Status   11/08/2022 139 136 - 145 mmol/L Final     Potassium   Date Value Ref Range Status   11/08/2022 3.9 3.5 - 5.2 mmol/L Final     Chloride   Date Value Ref Range Status   11/08/2022 107 98 - 107 mmol/L Final     CO2   Date Value Ref Range Status   11/08/2022 26.0 22.0 - 29.0 mmol/L Final     Calcium   Date Value Ref Range Status   11/08/2022 9.1 8.6 - 10.5 mg/dL Final     ALT (SGPT)   Date Value Ref Range Status   11/08/2022 6 1 - 33 U/L Final     AST (SGOT)   Date Value Ref Range Status   11/08/2022 13 1 - 32 U/L Final     WBC   Date Value Ref Range Status   11/08/2022 9.49 3.40 - 10.80 10*3/mm3 Final   06/02/2022 6.8 3.4 - 10.8 x10E3/uL Final     Hematocrit   Date Value Ref Range Status   11/08/2022  29.0 (L) 34.0 - 46.6 % Final     Platelets   Date Value Ref Range Status   11/08/2022 437 140 - 450 10*3/mm3 Final     Total Cholesterol   Date Value Ref Range Status   11/03/2022 148 0 - 200 mg/dL Final     Triglycerides   Date Value Ref Range Status   11/03/2022 47 0 - 150 mg/dL Final     HDL Cholesterol   Date Value Ref Range Status   11/03/2022 44 40 - 60 mg/dL Final     LDL Cholesterol    Date Value Ref Range Status   11/03/2022 94 0 - 100 mg/dL Final     LDL Chol Calc (NIH)   Date Value Ref Range Status   05/19/2022 74 0 - 99 mg/dL Final     LDL/HDL Ratio   Date Value Ref Range Status   11/03/2022 2.15  Final     Hemoglobin A1C   Date Value Ref Range Status   11/03/2022 5.20 4.80 - 5.60 % Final         Assessment / Plan     Assessment/Plan:  Diagnoses and all orders for this visit:    1. Moderate episode of recurrent major depressive disorder (HCC) (Primary)   We will discontinue the BuSpar-I do have some concerns that the BuSpar may be causing some of her side effects  - We will increase Pristiq from 50 mg to 100 mg daily.  - We advised the patient that if she does not notice a difference with Pristiq, then another option would be viibyrd.    2. Anxiety   We will discontinue the BuSpar.  - We will increase Pristiq from 50 mg to 100 mg daily.  - We advised the patient that if she does not notice a difference with Pristiq, then another option would be viibyrd.      3. Coronary artery disease involving native coronary artery of native heart without angina pectoris  -     nitroglycerin (NITROSTAT) 0.4 MG SL tablet; Take no more than 3 doses in 15 minutes.  Dispense: 25 tablet; Refill: 0        - She was advised to discontinue Plavix.   - She was advised for the next 2 weeks to only smoke 1 pack every 4 days, and chew on a sugar free spearmint instead if she feels the need to use tobacco.  - She will follow-up with Dr. Chi on 01/12/2023.        -     levocetirizine (XYZAL) 5 MG tablet; Take 1 tablet by mouth  Every Evening.  Dispense: 30 tablet; Refill: 2       -The patient will follow-up in 1 month.    I spent 30 minutes caring for Mark on this date of service. This time includes time spent by me in the following activities:reviewing tests and counseling and educating the patient/family/caregiver Transcribed from ambient dictation for LILLY Mock by Francis Hughes.  01/09/23   16:34 CST    Patient agreed to ambulate dictation for visit      Return in about 1 month (around 2/9/2023). unless patient needs to be seen sooner or acute issues arise.    I have discussed the patient results/orders and and plan/recommendation with them at today's visit.      LILLY Mock   01/09/2023

## 2023-02-02 ENCOUNTER — OFFICE VISIT (OUTPATIENT)
Dept: INTERNAL MEDICINE | Facility: CLINIC | Age: 43
End: 2023-02-02
Payer: MEDICAID

## 2023-02-02 VITALS
TEMPERATURE: 97.5 F | SYSTOLIC BLOOD PRESSURE: 118 MMHG | OXYGEN SATURATION: 97 % | BODY MASS INDEX: 30.51 KG/M2 | HEART RATE: 74 BPM | WEIGHT: 206 LBS | HEIGHT: 69 IN | DIASTOLIC BLOOD PRESSURE: 74 MMHG | RESPIRATION RATE: 17 BRPM

## 2023-02-02 DIAGNOSIS — F33.1 MODERATE EPISODE OF RECURRENT MAJOR DEPRESSIVE DISORDER: Primary | ICD-10-CM

## 2023-02-02 DIAGNOSIS — F41.9 ANXIETY: ICD-10-CM

## 2023-02-02 DIAGNOSIS — Z72.0 TOBACCO ABUSE: ICD-10-CM

## 2023-02-02 PROCEDURE — 99214 OFFICE O/P EST MOD 30 MIN: CPT | Performed by: NURSE PRACTITIONER

## 2023-02-02 RX ORDER — IBUPROFEN 800 MG/1
1 TABLET ORAL 3 TIMES DAILY
COMMUNITY
Start: 2022-11-16

## 2023-02-02 NOTE — PROGRESS NOTES
Subjective     Chief Complaint   Patient presents with   • Depression       History of Present Illness     The patient was seen a few weeks ago for chest pain. She had stents placed. Her Crestor was increased at that time. She was taking Pristiq, Wellbutrin, and BuSpar. She reports that the BuSpar was not helping. Her son called the police on her this weekend because he thought she was going to kill herself. She states that she never would put her family through that because her  put her through it. She discontinued the BuSpar. Her Pristiq was increased to 100 mg because she thought that was causing some side effects. She is already on 300 mg of Wellbutrin. She states that she is still sad and hateful. She sees a counselor at [facility]. She stated her  wants a divorce, but she has not talked to him all week because of her mood. She has not tried Viibryd. She reports that the BuSpar was causing memory loss. She took her last Pristiq this morning. She states that her son moved out of the house, and it kills her. Her counselor told her that she has PTSD. She states that some s** does not go away. She has an appointment with a psychiatrist at [facility].    She states that she is trying to quit smoking.    Patient's PMR from outside medical facility reviewed and noted.    Review of Systems     Otherwise complete ROS reviewed and negative except as mentioned in the HPI.    Past Medical History:   Past Medical History:   Diagnosis Date   • Anemia 2021   • Anxiety    • Asthma    • Clotting disorder ([facility]) 2021   • Deep vein thrombosis (DVT) of left upper extremity ([facility]ly [facility])    • Depression    • Hypertension 11/23   • Myocardial infarction (Formerly [facility]) 11/22   • PTSD (post-traumatic stress disorder)    • Stroke ([facility]ly [facility]) 2021   • Urinary tract infection 2018     Past Surgical History:  Past Surgical History:   Procedure Laterality Date   • CARDIAC CATHETERIZATION N/A 11/02/2022    Procedure: Left Heart Cath;  Surgeon:  Saturnino Ross, DO;  Location:  [facility];  Service: Cardiology;  Laterality: N/A;   •  SECTION     • CORONARY STENT PLACEMENT  22   • HYSTERECTOMY     • TONSILLECTOMY     • TUBAL ABDOMINAL LIGATION       Social History:  reports that she has been smoking cigarettes. She has a 15.00 pack-year smoking history. She has never used smokeless tobacco. She reports that she does not currently use alcohol. She reports that she does not currently use drugs.    Family History: family history includes Anxiety disorder in her mother; Asthma in her father; Breast cancer in her maternal grandmother; Depression in her mother; Heart disease in her father.      Allergies:  Allergies   Allergen Reactions   • Penicillins Hives     Medications:  Prior to Admission medications    Medication Sig Start Date End Date Taking? Authorizing Provider   albuterol sulfate  (90 Base) MCG/ACT inhaler Inhale 2 puffs Every 4 (Four) Hours As Needed for Wheezing or Shortness of Air. 22   Louisa oCok APRN   aspirin 81 MG EC tablet Take 1 tablet by mouth Daily. 22   Rocky Tim MD   baclofen (LIORESAL) 10 MG tablet Take 1 tablet by mouth 3 (Three) Times a Day. 22   Louisa Cook APRN   Brilinta 90 MG tablet tablet Take 90 mg by mouth 2 (Two) Times a Day. 23   ProviderLuis MD   buPROPion XL (Wellbutrin XL) 300 MG 24 hr tablet Take 1 tablet by mouth Daily. 9/15/22   Louisa Cook APRN   busPIRone (BUSPAR) 5 MG tablet Take 1 tablet by mouth 3 (Three) Times a Day. 22   Louisa Cook APRN   clopidogrel (PLAVIX) 75 MG tablet Take 1 tablet by mouth Daily. 22   Rocky Tim MD   cyanocobalamin 1000 MCG/ML injection Inject 1 mL into the appropriate muscle as directed by prescriber Every 28 (Twenty-Eight) Days. 22   Louisa Cook APRN   desvenlafaxine (Pristiq) 50 MG 24 hr tablet Take 1 tablet by mouth  Daily. 9/27/22   Louisa Cook APRN   docusate sodium (COLACE) 100 MG capsule TAKE 1 CAPSULE BY MOUTH TWICE DAILY FOR CONSTIPATION 11/16/22   Luis Dorsey MD   Eliquis 5 MG tablet tablet Take 5 mg by mouth Every 12 (Twelve) Hours. 2/21/22   Luis Dorsey MD   hydrOXYzine (ATARAX) 10 MG tablet Take 1 tablet by mouth 3 (Three) Times a Day As Needed for Anxiety. 5/4/22   Yuki Iniguez APRN   ibuprofen (ADVIL,MOTRIN) 800 MG tablet Take 1 tablet by mouth 3 (Three) Times a Day. 11/16/22   Luis Dorsey MD   levocetirizine (XYZAL) 5 MG tablet Take 1 tablet by mouth Every Evening. 1/9/23   Louisa Cook APRN   lisinopril (PRINIVIL,ZESTRIL) 5 MG tablet Take 1 tablet by mouth Daily. 11/4/22   Rocky Tim MD   lisinopril (PRINIVIL,ZESTRIL) 5 MG tablet lisinopril 5 mg tablet   Take 1 tablet every day by oral route.    Luis Dorsey MD   metoprolol succinate XL (TOPROL-XL) 25 MG 24 hr tablet Take 1 tablet by mouth Daily. 11/4/22   Rocky Tim MD   naloxone (NARCAN) 4 MG/0.1ML nasal spray ADMINISTER A SINGLE SPRAY IN ONE NOSTRIL UPON SIGNS OF OPIOID OVERDOSE. CALL 911. REPEAT AFTER 3 MINUTES IF NO RESPONSE. 11/16/22   Luis Dorsey MD   nitroglycerin (NITROSTAT) 0.4 MG SL tablet Take no more than 3 doses in 15 minutes. 1/9/23   Louisa Cook APRN   ondansetron ODT (ZOFRAN-ODT) 8 MG disintegrating tablet ondansetron 8 mg disintegrating tablet    Luis Dorsey MD   oxyCODONE-acetaminophen (PERCOCET) 5-325 MG per tablet Take  by mouth See Admin Instructions. Take 1 tablet by mouth every 4-6 hours as needed for pain 11/16/22   Luis Dorsey MD   rosuvastatin (CRESTOR) 20 MG tablet rosuvastatin 20 mg tablet    Luis Dorsey MD       PHQ-9 Depression Screening  Little interest or pleasure in doing things?     Feeling down, depressed, or hopeless?     Trouble falling or staying asleep, or sleeping too much?     Feeling tired or  "having little energy?     Poor appetite or overeating?     Feeling bad about yourself - or that you are a failure or have let yourself or your family down?     Trouble concentrating on things, such as reading the newspaper or watching television?     Moving or speaking so slowly that other people could have noticed? Or the opposite - being so fidgety or restless that you have been moving around a lot more than usual?     Thoughts that you would be better off dead, or of hurting yourself in some way?     PHQ-9 Total Score     If you checked off any problems, how difficult have these problems made it for you to do your work, take care of things at home, or get along with other people?                Objective     Vital Signs: /74 (BP Location: Right arm, Patient Position: Sitting, Cuff Size: Large Adult)   Pulse 74   Temp 97.5 °F (36.4 °C) (Infrared)   Resp 17   Ht 175.3 cm (69\")   Wt 93.4 kg (206 lb)   SpO2 97%   BMI 30.42 kg/m²   Physical Exam  Constitutional:       Appearance: She is well-developed.   HENT:      Head: Normocephalic and atraumatic.   Eyes:      General: No scleral icterus.     Conjunctiva/sclera: Conjunctivae normal.      Pupils: Pupils are equal, round, and reactive to light.   Neck:      Vascular: No JVD.   Cardiovascular:      Rate and Rhythm: Normal rate and regular rhythm.      Heart sounds: Normal heart sounds. No murmur heard.    No friction rub. No gallop.   Pulmonary:      Effort: Pulmonary effort is normal.      Breath sounds: Normal breath sounds. No wheezing or rales.   Abdominal:      General: Bowel sounds are normal. There is no distension.      Palpations: Abdomen is soft. There is no mass.      Tenderness: There is no abdominal tenderness. There is no guarding or rebound.   Musculoskeletal:         General: Normal range of motion.      Cervical back: Neck supple.      Comments: No cyanosis or clubbing   Lymphadenopathy:      Cervical: No cervical adenopathy.   Skin:     " General: Skin is warm and dry.   Neurological:      Mental Status: She is alert and oriented to person, place, and time.      Cranial Nerves: No cranial nerve deficit.   Psychiatric:         Behavior: Behavior normal.      Comments: Pt is very anxious and tearful.          BMI is >= 30 and <35. (Class 1 Obesity). The following options were offered after discussion;: exercise counseling/recommendations and nutrition counseling/recommendations      Results Reviewed:  Glucose   Date Value Ref Range Status   11/08/2022 135 (H) 65 - 99 mg/dL Final     BUN   Date Value Ref Range Status   11/08/2022 11 6 - 20 mg/dL Final     Creatinine   Date Value Ref Range Status   11/08/2022 0.82 0.57 - 1.00 mg/dL Final     Sodium   Date Value Ref Range Status   11/08/2022 139 136 - 145 mmol/L Final     Potassium   Date Value Ref Range Status   11/08/2022 3.9 3.5 - 5.2 mmol/L Final     Chloride   Date Value Ref Range Status   11/08/2022 107 98 - 107 mmol/L Final     CO2   Date Value Ref Range Status   11/08/2022 26.0 22.0 - 29.0 mmol/L Final     Calcium   Date Value Ref Range Status   11/08/2022 9.1 8.6 - 10.5 mg/dL Final     ALT (SGPT)   Date Value Ref Range Status   11/08/2022 6 1 - 33 U/L Final     AST (SGOT)   Date Value Ref Range Status   11/08/2022 13 1 - 32 U/L Final     WBC   Date Value Ref Range Status   11/08/2022 9.49 3.40 - 10.80 10*3/mm3 Final   06/02/2022 6.8 3.4 - 10.8 x10E3/uL Final     Hematocrit   Date Value Ref Range Status   11/08/2022 29.0 (L) 34.0 - 46.6 % Final     Platelets   Date Value Ref Range Status   11/08/2022 437 140 - 450 10*3/mm3 Final     Total Cholesterol   Date Value Ref Range Status   11/03/2022 148 0 - 200 mg/dL Final     Triglycerides   Date Value Ref Range Status   11/03/2022 47 0 - 150 mg/dL Final     HDL Cholesterol   Date Value Ref Range Status   11/03/2022 44 40 - 60 mg/dL Final     LDL Cholesterol    Date Value Ref Range Status   11/03/2022 94 0 - 100 mg/dL Final     LDL Chol Calc (NIH)    Date Value Ref Range Status   05/19/2022 74 0 - 99 mg/dL Final     LDL/HDL Ratio   Date Value Ref Range Status   11/03/2022 2.15  Final     Hemoglobin A1C   Date Value Ref Range Status   11/03/2022 5.20 4.80 - 5.60 % Final         Assessment / Plan     Assessment/Plan:  1. Moderate episode of recurrent major depressive disorder (HCC)  - The patient will start Viibryd.  - She will continue taking Wellbutrin.  - She will discontinue Pristiq.    2. Anxiety  - Viibryd and Wellbutrin    3. Tobacco abuse  She will continue to work on smoking cessation      No follow-ups on file. unless patient needs to be seen sooner or acute issues arise.      I have discussed the patient results/orders and and plan/recommendation with them at today's visit.      Transcribed from ambient dictation for LILLY Mock by Agnieszka Hallman.  02/02/23   13:13 CST    Patient or patient representative verbalized consent to the visit recording.  I have personally performed the services described in this document as transcribed by the above individual, and it is both accurate and complete.  LILLY Mock  2/2/2023  13:44 CST        Answers for HPI/ROS submitted by the patient on 2/1/2023  Please describe your symptoms.: Check up for meds  Have you had these symptoms before?: Yes  How long have you been having these symptoms?: 1-4 days  What is the primary reason for your visit?: Other

## 2023-02-27 ENCOUNTER — TELEPHONE (OUTPATIENT)
Dept: INTERNAL MEDICINE | Facility: CLINIC | Age: 43
End: 2023-02-27
Payer: MEDICAID

## 2023-02-27 RX ORDER — VILAZODONE HYDROCHLORIDE 20 MG/1
20 TABLET ORAL DAILY
Qty: 30 TABLET | Refills: 1 | Status: SHIPPED | OUTPATIENT
Start: 2023-02-27

## 2023-02-27 NOTE — TELEPHONE ENCOUNTER
Patient wants refill on Viibryd. I didn't see it in her med list. Did she have a sample and if so what dose would you like her on?

## 2023-02-27 NOTE — TELEPHONE ENCOUNTER
----- Message from Mark Huddleston sent at 2/27/2023  9:14 AM CST -----  Regarding: [place]  Contact: [phone]  Can I get a refill on my viibryd? It’s been working amazingly!

## 2023-03-12 DIAGNOSIS — F33.1 MODERATE EPISODE OF RECURRENT MAJOR DEPRESSIVE DISORDER: ICD-10-CM

## 2023-03-12 DIAGNOSIS — Z72.0 TOBACCO ABUSE: ICD-10-CM

## 2023-03-13 RX ORDER — METOPROLOL SUCCINATE 25 MG/1
25 TABLET, EXTENDED RELEASE ORAL
Qty: 30 TABLET | Refills: 0 | Status: SHIPPED | OUTPATIENT
Start: 2023-03-13

## 2023-03-13 RX ORDER — ASPIRIN 81 MG/1
81 TABLET ORAL DAILY
Qty: 30 TABLET | Refills: 0 | Status: SHIPPED | OUTPATIENT
Start: 2023-03-13

## 2023-03-13 RX ORDER — BUPROPION HYDROCHLORIDE 300 MG/1
300 TABLET ORAL DAILY
Qty: 90 TABLET | Refills: 1 | Status: SHIPPED | OUTPATIENT
Start: 2023-03-13

## 2023-03-13 RX ORDER — LEVOCETIRIZINE DIHYDROCHLORIDE 5 MG/1
5 TABLET, FILM COATED ORAL EVERY EVENING
Qty: 30 TABLET | Refills: 2 | Status: SHIPPED | OUTPATIENT
Start: 2023-03-13

## 2023-03-13 RX ORDER — LISINOPRIL 5 MG/1
5 TABLET ORAL DAILY
Qty: 90 TABLET | Refills: 2 | Status: SHIPPED | OUTPATIENT
Start: 2023-03-13

## 2023-03-13 RX ORDER — ALBUTEROL SULFATE 90 UG/1
2 AEROSOL, METERED RESPIRATORY (INHALATION) EVERY 4 HOURS PRN
Qty: 6.7 G | Refills: 6 | Status: SHIPPED | OUTPATIENT
Start: 2023-03-13

## 2023-03-13 NOTE — TELEPHONE ENCOUNTER
Rx Refill Note  Requested Prescriptions     Pending Prescriptions Disp Refills   • albuterol sulfate  (90 Base) MCG/ACT inhaler 6.7 g 6     Sig: Inhale 2 puffs Every 4 (Four) Hours As Needed for Wheezing or Shortness of Air.   • buPROPion XL (Wellbutrin XL) 300 MG 24 hr tablet 90 tablet 1     Sig: Take 1 tablet by mouth Daily.   • lisinopril (PRINIVIL,ZESTRIL) 5 MG tablet     • levocetirizine (XYZAL) 5 MG tablet 30 tablet 2     Sig: Take 1 tablet by mouth Every Evening.      Last office visit with prescribing clinician: 2/2/2023   Next office visit with prescribing clinician: Visit date not found                         Would you like a call back once the refill request has been completed: [] Yes [] No    If the office needs to give you a call back, can they leave a voicemail: [] Yes [] No    Taylor Gabriel RN  03/13/23, 07:11 CDT

## 2023-04-14 ENCOUNTER — HOSPITAL ENCOUNTER (INPATIENT)
Age: 43
LOS: 2 days | Discharge: HOME | DRG: 881 | End: 2023-04-17
Attending: EMERGENCY MEDICINE | Admitting: PSYCHIATRY & NEUROLOGY
Payer: COMMERCIAL

## 2023-04-14 DIAGNOSIS — T50.902A INTENTIONAL OVERDOSE, INITIAL ENCOUNTER (HCC): Primary | ICD-10-CM

## 2023-04-14 DIAGNOSIS — R45.851 DEPRESSION WITH SUICIDAL IDEATION: ICD-10-CM

## 2023-04-14 DIAGNOSIS — F32.A DEPRESSION WITH SUICIDAL IDEATION: ICD-10-CM

## 2023-04-14 LAB
ALBUMIN SERPL-MCNC: 3.9 G/DL (ref 3.5–5.2)
ALP SERPL-CCNC: 69 U/L (ref 35–104)
ALT SERPL-CCNC: 7 U/L (ref 5–33)
ANION GAP SERPL CALCULATED.3IONS-SCNC: 9 MMOL/L (ref 7–19)
APAP SERPL-MCNC: 9 UG/ML (ref 10–30)
APAP SERPL-MCNC: <5 UG/ML (ref 10–30)
AST SERPL-CCNC: 15 U/L (ref 5–32)
BILIRUB SERPL-MCNC: <0.2 MG/DL (ref 0.2–1.2)
BUN SERPL-MCNC: 7 MG/DL (ref 6–20)
CALCIUM SERPL-MCNC: 8.7 MG/DL (ref 8.6–10)
CHLORIDE SERPL-SCNC: 110 MMOL/L (ref 98–111)
CO2 SERPL-SCNC: 23 MMOL/L (ref 22–29)
CREAT SERPL-MCNC: 0.9 MG/DL (ref 0.5–0.9)
ERYTHROCYTE [DISTWIDTH] IN BLOOD BY AUTOMATED COUNT: 17.4 % (ref 11.5–14.5)
ETHANOLAMINE SERPL-MCNC: <10 MG/DL (ref 0–0.08)
GLUCOSE SERPL-MCNC: 113 MG/DL (ref 74–109)
HCG SERPL QL: NEGATIVE
HCT VFR BLD AUTO: 38.1 % (ref 37–47)
HGB BLD-MCNC: 11.7 G/DL (ref 12–16)
MCH RBC QN AUTO: 26.2 PG (ref 27–31)
MCHC RBC AUTO-ENTMCNC: 30.7 G/DL (ref 33–37)
MCV RBC AUTO: 85.2 FL (ref 81–99)
PLATELET # BLD AUTO: 270 K/UL (ref 130–400)
PMV BLD AUTO: 11.8 FL (ref 9.4–12.3)
POTASSIUM SERPL-SCNC: 3.5 MMOL/L (ref 3.5–5)
PROT SERPL-MCNC: 6.3 G/DL (ref 6.6–8.7)
RBC # BLD AUTO: 4.47 M/UL (ref 4.2–5.4)
SALICYLATES SERPL-MCNC: <0.3 MG/DL (ref 3–10)
SARS-COV-2 RDRP RESP QL NAA+PROBE: NOT DETECTED
SODIUM SERPL-SCNC: 142 MMOL/L (ref 136–145)
TROPONIN T SERPL-MCNC: <0.01 NG/ML (ref 0–0.03)
WBC # BLD AUTO: 9.8 K/UL (ref 4.8–10.8)

## 2023-04-14 PROCEDURE — 87635 SARS-COV-2 COVID-19 AMP PRB: CPT

## 2023-04-14 PROCEDURE — 93005 ELECTROCARDIOGRAM TRACING: CPT | Performed by: EMERGENCY MEDICINE

## 2023-04-14 PROCEDURE — 80143 DRUG ASSAY ACETAMINOPHEN: CPT

## 2023-04-14 PROCEDURE — 82077 ASSAY SPEC XCP UR&BREATH IA: CPT

## 2023-04-14 PROCEDURE — 99285 EMERGENCY DEPT VISIT HI MDM: CPT

## 2023-04-14 PROCEDURE — 84484 ASSAY OF TROPONIN QUANT: CPT

## 2023-04-14 PROCEDURE — 80179 DRUG ASSAY SALICYLATE: CPT

## 2023-04-14 PROCEDURE — 36415 COLL VENOUS BLD VENIPUNCTURE: CPT

## 2023-04-14 PROCEDURE — 85027 COMPLETE CBC AUTOMATED: CPT

## 2023-04-14 PROCEDURE — 80053 COMPREHEN METABOLIC PANEL: CPT

## 2023-04-14 PROCEDURE — 84703 CHORIONIC GONADOTROPIN ASSAY: CPT

## 2023-04-14 RX ORDER — BUPROPION HYDROCHLORIDE 300 MG/1
300 TABLET ORAL DAILY
COMMUNITY
Start: 2023-03-13

## 2023-04-14 RX ORDER — ROSUVASTATIN CALCIUM 20 MG/1
TABLET, COATED ORAL
COMMUNITY

## 2023-04-14 RX ORDER — LEVOCETIRIZINE DIHYDROCHLORIDE 5 MG/1
5 TABLET, FILM COATED ORAL EVERY EVENING
COMMUNITY
Start: 2023-03-13

## 2023-04-14 RX ORDER — METOPROLOL SUCCINATE 25 MG/1
1 TABLET, EXTENDED RELEASE ORAL DAILY
COMMUNITY
Start: 2023-03-13

## 2023-04-14 RX ORDER — ASPIRIN 81 MG/1
81 TABLET ORAL DAILY
COMMUNITY
Start: 2023-03-13

## 2023-04-14 RX ORDER — VILAZODONE HYDROCHLORIDE 20 MG/1
20 TABLET ORAL DAILY
COMMUNITY
Start: 2023-02-27

## 2023-04-14 RX ORDER — LISINOPRIL 5 MG/1
5 TABLET ORAL DAILY
COMMUNITY
Start: 2022-11-04

## 2023-04-14 ASSESSMENT — ENCOUNTER SYMPTOMS
DIARRHEA: 0
NAUSEA: 1
VOMITING: 1
EYE PAIN: 0
ABDOMINAL PAIN: 0
SHORTNESS OF BREATH: 0

## 2023-04-15 PROBLEM — F17.200 TOBACCO USE DISORDER: Status: ACTIVE | Noted: 2023-04-15

## 2023-04-15 PROBLEM — T14.91XA SUICIDE ATTEMPT (HCC): Status: ACTIVE | Noted: 2023-04-15

## 2023-04-15 PROBLEM — F32.A DEPRESSION WITH SUICIDAL IDEATION: Status: ACTIVE | Noted: 2023-04-15

## 2023-04-15 PROBLEM — F15.20 METHAMPHETAMINE USE DISORDER, SEVERE (HCC): Status: ACTIVE | Noted: 2023-04-15

## 2023-04-15 PROBLEM — F12.90 CANNABIS USE DISORDER: Status: ACTIVE | Noted: 2023-04-15

## 2023-04-15 PROBLEM — T50.902A INTENTIONAL OVERDOSE (HCC): Status: ACTIVE | Noted: 2023-04-15

## 2023-04-15 PROBLEM — F41.9 ANXIETY DISORDER: Status: ACTIVE | Noted: 2023-04-15

## 2023-04-15 PROBLEM — R45.851 DEPRESSION WITH SUICIDAL IDEATION: Status: ACTIVE | Noted: 2023-04-15

## 2023-04-15 PROBLEM — F32.A DEPRESSION, UNSPECIFIED: Status: ACTIVE | Noted: 2023-04-15

## 2023-04-15 LAB
AMPHET UR QL SCN: POSITIVE
BARBITURATES UR QL SCN: NEGATIVE
BENZODIAZ UR QL SCN: NEGATIVE
BUPRENORPHINE URINE: NEGATIVE
CANNABINOIDS UR QL SCN: POSITIVE
COCAINE UR QL SCN: NEGATIVE
DRUG SCREEN COMMENT UR-IMP: ABNORMAL
METHADONE UR QL SCN: NEGATIVE
METHAMPHETAMINE, URINE: POSITIVE
OPIATES UR QL SCN: NEGATIVE
OXYCODONE UR QL SCN: NEGATIVE
PCP UR QL SCN: NEGATIVE
PROPOXYPH UR QL SCN: NEGATIVE
TRICYCLIC, URINE: NEGATIVE

## 2023-04-15 PROCEDURE — 36415 COLL VENOUS BLD VENIPUNCTURE: CPT

## 2023-04-15 PROCEDURE — 6370000000 HC RX 637 (ALT 250 FOR IP): Performed by: PSYCHIATRY & NEUROLOGY

## 2023-04-15 PROCEDURE — 94640 AIRWAY INHALATION TREATMENT: CPT

## 2023-04-15 PROCEDURE — 80306 DRUG TEST PRSMV INSTRMNT: CPT

## 2023-04-15 PROCEDURE — 80143 DRUG ASSAY ACETAMINOPHEN: CPT

## 2023-04-15 PROCEDURE — 1240000000 HC EMOTIONAL WELLNESS R&B

## 2023-04-15 RX ORDER — VILAZODONE HYDROCHLORIDE 10 MG/1
20 TABLET ORAL DAILY
Status: DISCONTINUED | OUTPATIENT
Start: 2023-04-15 | End: 2023-04-17 | Stop reason: HOSPADM

## 2023-04-15 RX ORDER — HYDROXYZINE HYDROCHLORIDE 25 MG/1
25 TABLET, FILM COATED ORAL 3 TIMES DAILY PRN
Status: DISCONTINUED | OUTPATIENT
Start: 2023-04-15 | End: 2023-04-17 | Stop reason: HOSPADM

## 2023-04-15 RX ORDER — METOPROLOL SUCCINATE 25 MG/1
25 TABLET, EXTENDED RELEASE ORAL DAILY
Status: DISCONTINUED | OUTPATIENT
Start: 2023-04-15 | End: 2023-04-17 | Stop reason: HOSPADM

## 2023-04-15 RX ORDER — LEVOCETIRIZINE DIHYDROCHLORIDE 5 MG/1
5 TABLET, FILM COATED ORAL EVERY EVENING
Status: DISCONTINUED | OUTPATIENT
Start: 2023-04-15 | End: 2023-04-15 | Stop reason: CLARIF

## 2023-04-15 RX ORDER — ACETAMINOPHEN 325 MG/1
650 TABLET ORAL EVERY 4 HOURS PRN
Status: DISCONTINUED | OUTPATIENT
Start: 2023-04-15 | End: 2023-04-17 | Stop reason: HOSPADM

## 2023-04-15 RX ORDER — BUPROPION HYDROCHLORIDE 150 MG/1
300 TABLET ORAL DAILY
Status: DISCONTINUED | OUTPATIENT
Start: 2023-04-15 | End: 2023-04-17 | Stop reason: HOSPADM

## 2023-04-15 RX ORDER — MECOBALAMIN 5000 MCG
5 TABLET,DISINTEGRATING ORAL NIGHTLY
Status: DISCONTINUED | OUTPATIENT
Start: 2023-04-15 | End: 2023-04-17 | Stop reason: HOSPADM

## 2023-04-15 RX ORDER — ROSUVASTATIN CALCIUM 10 MG/1
20 TABLET, COATED ORAL NIGHTLY
Status: DISCONTINUED | OUTPATIENT
Start: 2023-04-15 | End: 2023-04-17 | Stop reason: HOSPADM

## 2023-04-15 RX ORDER — RISPERIDONE 1 MG/1
2 TABLET, ORALLY DISINTEGRATING ORAL EVERY 6 HOURS PRN
Status: DISCONTINUED | OUTPATIENT
Start: 2023-04-15 | End: 2023-04-17 | Stop reason: HOSPADM

## 2023-04-15 RX ORDER — POLYETHYLENE GLYCOL 3350 17 G/17G
17 POWDER, FOR SOLUTION ORAL DAILY PRN
Status: DISCONTINUED | OUTPATIENT
Start: 2023-04-15 | End: 2023-04-17 | Stop reason: HOSPADM

## 2023-04-15 RX ORDER — ALBUTEROL SULFATE 90 UG/1
2 AEROSOL, METERED RESPIRATORY (INHALATION) EVERY 6 HOURS PRN
Status: DISCONTINUED | OUTPATIENT
Start: 2023-04-15 | End: 2023-04-17 | Stop reason: HOSPADM

## 2023-04-15 RX ORDER — ALBUTEROL SULFATE 90 UG/1
2 AEROSOL, METERED RESPIRATORY (INHALATION) EVERY 6 HOURS PRN
Status: DISCONTINUED | OUTPATIENT
Start: 2023-04-15 | End: 2023-04-15

## 2023-04-15 RX ORDER — CETIRIZINE HYDROCHLORIDE 10 MG/1
10 TABLET ORAL EVERY EVENING
Status: DISCONTINUED | OUTPATIENT
Start: 2023-04-15 | End: 2023-04-17 | Stop reason: HOSPADM

## 2023-04-15 RX ORDER — NITROGLYCERIN 0.4 MG/1
0.4 TABLET SUBLINGUAL EVERY 5 MIN PRN
COMMUNITY

## 2023-04-15 RX ORDER — RISPERIDONE 1 MG/1
2 TABLET, ORALLY DISINTEGRATING ORAL 2 TIMES DAILY PRN
Status: DISCONTINUED | OUTPATIENT
Start: 2023-04-15 | End: 2023-04-15

## 2023-04-15 RX ORDER — ASPIRIN 81 MG/1
81 TABLET ORAL DAILY
Status: DISCONTINUED | OUTPATIENT
Start: 2023-04-15 | End: 2023-04-17 | Stop reason: HOSPADM

## 2023-04-15 RX ORDER — TRAZODONE HYDROCHLORIDE 50 MG/1
50 TABLET ORAL NIGHTLY
Status: DISCONTINUED | OUTPATIENT
Start: 2023-04-15 | End: 2023-04-17 | Stop reason: HOSPADM

## 2023-04-15 RX ORDER — RISPERIDONE 1 MG/1
2 TABLET, ORALLY DISINTEGRATING ORAL ONCE
Status: DISCONTINUED | OUTPATIENT
Start: 2023-04-15 | End: 2023-04-15

## 2023-04-15 RX ORDER — LISINOPRIL 5 MG/1
5 TABLET ORAL DAILY
Status: DISCONTINUED | OUTPATIENT
Start: 2023-04-15 | End: 2023-04-17 | Stop reason: HOSPADM

## 2023-04-15 RX ORDER — NICOTINE 21 MG/24HR
1 PATCH, TRANSDERMAL 24 HOURS TRANSDERMAL DAILY
Status: DISCONTINUED | OUTPATIENT
Start: 2023-04-15 | End: 2023-04-17 | Stop reason: HOSPADM

## 2023-04-15 RX ADMIN — Medication 5 MG: at 04:18

## 2023-04-15 RX ADMIN — ALBUTEROL SULFATE 2 PUFF: 90 AEROSOL, METERED RESPIRATORY (INHALATION) at 21:35

## 2023-04-15 RX ADMIN — TRAZODONE HYDROCHLORIDE 50 MG: 50 TABLET ORAL at 04:18

## 2023-04-15 RX ADMIN — TRAZODONE HYDROCHLORIDE 50 MG: 50 TABLET ORAL at 21:09

## 2023-04-15 RX ADMIN — TICAGRELOR 90 MG: 90 TABLET ORAL at 21:08

## 2023-04-15 RX ADMIN — Medication 5 MG: at 21:09

## 2023-04-15 RX ADMIN — METOPROLOL SUCCINATE 25 MG: 25 TABLET, EXTENDED RELEASE ORAL at 16:25

## 2023-04-15 RX ADMIN — VILAZODONE HYDROCHLORIDE 20 MG: 10 TABLET ORAL at 16:24

## 2023-04-15 RX ADMIN — RISPERIDONE 2 MG: 1 TABLET, ORALLY DISINTEGRATING ORAL at 12:55

## 2023-04-15 RX ADMIN — BUPROPION HYDROCHLORIDE 300 MG: 150 TABLET, EXTENDED RELEASE ORAL at 16:24

## 2023-04-15 RX ADMIN — ASPIRIN 81 MG: 81 TABLET, COATED ORAL at 16:25

## 2023-04-15 RX ADMIN — ROSUVASTATIN CALCIUM 20 MG: 10 TABLET, FILM COATED ORAL at 21:08

## 2023-04-15 RX ADMIN — ALBUTEROL SULFATE 2 PUFF: 90 AEROSOL, METERED RESPIRATORY (INHALATION) at 08:51

## 2023-04-15 RX ADMIN — LISINOPRIL 5 MG: 5 TABLET ORAL at 16:25

## 2023-04-15 RX ADMIN — CETIRIZINE HYDROCHLORIDE 10 MG: 10 TABLET, FILM COATED ORAL at 16:53

## 2023-04-15 ASSESSMENT — SLEEP AND FATIGUE QUESTIONNAIRES
AVERAGE NUMBER OF SLEEP HOURS: 7
DO YOU HAVE DIFFICULTY SLEEPING: NO
DO YOU USE A SLEEP AID: NO

## 2023-04-15 ASSESSMENT — LIFESTYLE VARIABLES
HOW OFTEN DO YOU HAVE A DRINK CONTAINING ALCOHOL: PATIENT DECLINED
HOW MANY STANDARD DRINKS CONTAINING ALCOHOL DO YOU HAVE ON A TYPICAL DAY: PATIENT DECLINED

## 2023-04-15 ASSESSMENT — ENCOUNTER SYMPTOMS
RESPIRATORY NEGATIVE: 1
GASTROINTESTINAL NEGATIVE: 1

## 2023-04-16 LAB
25(OH)D3 SERPL-MCNC: 18.1 NG/ML
CHOLEST SERPL-MCNC: 112 MG/DL (ref 160–199)
HBA1C MFR BLD: 5.3 % (ref 4–6)
HDLC SERPL-MCNC: 52 MG/DL (ref 65–121)
LDLC SERPL CALC-MCNC: 49 MG/DL
TRIGL SERPL-MCNC: 56 MG/DL (ref 0–149)
TSH SERPL DL<=0.005 MIU/L-ACNC: 1.14 UIU/ML (ref 0.27–4.2)
VIT B12 SERPL-MCNC: 534 PG/ML (ref 211–946)

## 2023-04-16 PROCEDURE — 94640 AIRWAY INHALATION TREATMENT: CPT

## 2023-04-16 PROCEDURE — 84443 ASSAY THYROID STIM HORMONE: CPT

## 2023-04-16 PROCEDURE — 83036 HEMOGLOBIN GLYCOSYLATED A1C: CPT

## 2023-04-16 PROCEDURE — 6370000000 HC RX 637 (ALT 250 FOR IP): Performed by: PSYCHIATRY & NEUROLOGY

## 2023-04-16 PROCEDURE — 82607 VITAMIN B-12: CPT

## 2023-04-16 PROCEDURE — 1240000000 HC EMOTIONAL WELLNESS R&B

## 2023-04-16 PROCEDURE — 36415 COLL VENOUS BLD VENIPUNCTURE: CPT

## 2023-04-16 PROCEDURE — 82306 VITAMIN D 25 HYDROXY: CPT

## 2023-04-16 PROCEDURE — 80061 LIPID PANEL: CPT

## 2023-04-16 RX ADMIN — ALBUTEROL SULFATE 2 PUFF: 90 AEROSOL, METERED RESPIRATORY (INHALATION) at 09:15

## 2023-04-16 RX ADMIN — CETIRIZINE HYDROCHLORIDE 10 MG: 10 TABLET, FILM COATED ORAL at 21:13

## 2023-04-16 RX ADMIN — VILAZODONE HYDROCHLORIDE 20 MG: 10 TABLET ORAL at 08:34

## 2023-04-16 RX ADMIN — TICAGRELOR 90 MG: 90 TABLET ORAL at 21:13

## 2023-04-16 RX ADMIN — ALBUTEROL SULFATE 2 PUFF: 90 AEROSOL, METERED RESPIRATORY (INHALATION) at 18:00

## 2023-04-16 RX ADMIN — ROSUVASTATIN CALCIUM 20 MG: 10 TABLET, FILM COATED ORAL at 21:13

## 2023-04-16 RX ADMIN — TICAGRELOR 90 MG: 90 TABLET ORAL at 08:34

## 2023-04-16 RX ADMIN — TRAZODONE HYDROCHLORIDE 50 MG: 50 TABLET ORAL at 21:13

## 2023-04-16 RX ADMIN — BUPROPION HYDROCHLORIDE 300 MG: 150 TABLET, EXTENDED RELEASE ORAL at 08:33

## 2023-04-16 RX ADMIN — ASPIRIN 81 MG: 81 TABLET, COATED ORAL at 08:34

## 2023-04-16 RX ADMIN — RISPERIDONE 2 MG: 1 TABLET, ORALLY DISINTEGRATING ORAL at 11:56

## 2023-04-16 RX ADMIN — Medication 5 MG: at 21:12

## 2023-04-17 ENCOUNTER — READMISSION MANAGEMENT (OUTPATIENT)
Dept: CALL CENTER | Facility: HOSPITAL | Age: 43
End: 2023-04-17
Payer: MEDICAID

## 2023-04-17 VITALS
DIASTOLIC BLOOD PRESSURE: 79 MMHG | TEMPERATURE: 96.8 F | WEIGHT: 170 LBS | HEART RATE: 94 BPM | SYSTOLIC BLOOD PRESSURE: 124 MMHG | OXYGEN SATURATION: 99 % | HEIGHT: 70 IN | RESPIRATION RATE: 18 BRPM | BODY MASS INDEX: 24.34 KG/M2

## 2023-04-17 PROBLEM — T14.91XA SUICIDE ATTEMPT (HCC): Status: RESOLVED | Noted: 2023-04-15 | Resolved: 2023-04-17

## 2023-04-17 PROBLEM — T50.902A INTENTIONAL OVERDOSE (HCC): Status: RESOLVED | Noted: 2023-04-15 | Resolved: 2023-04-17

## 2023-04-17 PROBLEM — F32.A DEPRESSION: Status: RESOLVED | Noted: 2023-04-15 | Resolved: 2023-04-17

## 2023-04-17 LAB
EKG P AXIS: 51 DEGREES
EKG P-R INTERVAL: 146 MS
EKG Q-T INTERVAL: 372 MS
EKG QRS DURATION: 80 MS
EKG QTC CALCULATION (BAZETT): 401 MS
EKG T AXIS: 38 DEGREES

## 2023-04-17 PROCEDURE — 93010 ELECTROCARDIOGRAM REPORT: CPT | Performed by: INTERNAL MEDICINE

## 2023-04-17 PROCEDURE — 6370000000 HC RX 637 (ALT 250 FOR IP): Performed by: PSYCHIATRY & NEUROLOGY

## 2023-04-17 PROCEDURE — 5130000000 HC BRIDGE APPOINTMENT

## 2023-04-17 PROCEDURE — 94640 AIRWAY INHALATION TREATMENT: CPT

## 2023-04-17 RX ORDER — ERGOCALCIFEROL 1.25 MG/1
50000 CAPSULE ORAL WEEKLY
Qty: 12 CAPSULE | Refills: 1 | Status: SHIPPED | OUTPATIENT
Start: 2023-04-17

## 2023-04-17 RX ADMIN — METOPROLOL SUCCINATE 25 MG: 25 TABLET, EXTENDED RELEASE ORAL at 08:17

## 2023-04-17 RX ADMIN — TICAGRELOR 90 MG: 90 TABLET ORAL at 08:11

## 2023-04-17 RX ADMIN — ASPIRIN 81 MG: 81 TABLET, COATED ORAL at 08:11

## 2023-04-17 RX ADMIN — LISINOPRIL 5 MG: 5 TABLET ORAL at 08:17

## 2023-04-17 RX ADMIN — BUPROPION HYDROCHLORIDE 300 MG: 150 TABLET, EXTENDED RELEASE ORAL at 08:11

## 2023-04-17 RX ADMIN — HYDROXYZINE HYDROCHLORIDE 25 MG: 25 TABLET, FILM COATED ORAL at 08:13

## 2023-04-17 RX ADMIN — ALBUTEROL SULFATE 2 PUFF: 90 AEROSOL, METERED RESPIRATORY (INHALATION) at 11:43

## 2023-04-17 RX ADMIN — VILAZODONE HYDROCHLORIDE 20 MG: 10 TABLET ORAL at 08:11

## 2023-04-17 NOTE — PLAN OF CARE
Problem: Coping  Goal: Pt/Family able to verbalize concerns and demonstrate effective coping strategies  Description: INTERVENTIONS:  1. Assist patient/family to identify coping skills, available support systems and cultural and spiritual values  2. Provide emotional support, including active listening and acknowledgement of concerns of patient and caregivers  3. Reduce environmental stimuli, as able  4. Instruct patient/family in relaxation techniques, as appropriate  5. Assess for spiritual pain/suffering and initiate Spiritual Care, Psychosocial Clinical Specialist consults as needed  4/17/2023 1116 by Caleb Issa  Outcome: Progressing  Note:                                                                     Group Therapy Note    Date: 4/17/2023  Start Time: 1000  End Time:  1030  Number of Participants: 12    Type of Group: Psychoeducation    Wellness Binder Information  Module Name:  Relapse Prevention  Session Number:  5    Group Goal for Pt: To improve knowledge of relapse prevention strategies     Notes:  Pt demonstrated improved knowledge of relapse prevention strategies by actively participating in group discussion. Status After Intervention:  Unchanged    Participation Level:  Active Listener and Interactive    Participation Quality: Appropriate and Attentive      Speech:  normal      Thought Process/Content: Logical      Affective Functioning: Congruent      Mood: anxious and depressed      Level of consciousness:  Alert and Oriented x4      Response to Learning: Able to verbalize current knowledge/experience, Able to verbalize/acknowledge new learning, and Progressing to goal      Endings: None Reported    Modes of Intervention: Education      Discipline Responsible: Psychoeducational Specialist      Signature:  Caleb Issa

## 2023-04-17 NOTE — PLAN OF CARE
Problem: Anxiety  Goal: Will report anxiety at manageable levels  4/17/2023 1134 by Janay Silvestre  Outcome: Progressing                                                                       Group Therapy Note    Date: 4/17/2023  Start Time: 1100  End Time:  7356  Number of Participants: 6    Type of Group: Psychotherapy    Wellness Binder Information  Module Name:  emotional wellness  Session Number:  1    Patient's Goal:  validation of feelings    Notes:  pt acknowledged to have feelings validated it may be necessary to share feelings with others.     Status After Intervention:  Improved    Participation Level: Interactive    Participation Quality: Appropriate, Attentive, and Sharing      Speech:  normal      Thought Process/Content: Logical      Affective Functioning: Congruent      Mood: congruent      Level of consciousness:  Alert, Oriented x4, and Attentive      Response to Learning: Able to verbalize current knowledge/experience      Endings: None Reported    Modes of Intervention: Education      Discipline Responsible: Psychoeducational Specialist      Signature:  Janay Silvestre

## 2023-04-17 NOTE — PLAN OF CARE
Problem: Risk for Elopement  Goal: Patient will not exit the unit/facility without proper excort  4/17/2023 1023 by Lisa Aragon RN  Outcome: Progressing  4/17/2023 0155 by Erik Damon RN  Outcome: Progressing     Problem: Self Harm/Suicidality  Goal: Will have no self-injury during hospital stay  Description: INTERVENTIONS:  1. Ensure constant observer at bedside with Q15M safety checks  2. Maintain a safe environment  3. Secure patient belongings  4. Ensure family/visitors adhere to safety recommendations  5. Ensure safety tray has been added to patient's diet order  6. Every shift and PRN: Re-assess suicidal risk via Frequent Screener    4/17/2023 1023 by Lisa Aragon RN  Outcome: Progressing  4/17/2023 0155 by Erik Damon RN  Outcome: Progressing     Problem: Anxiety  Goal: Will report anxiety at manageable levels  Description: INTERVENTIONS:  1. Administer medication as ordered  2. Teach and rehearse alternative coping skills  3. Provide emotional support with 1:1 interaction with staff  4/17/2023 1023 by Lisa Aragon RN  Outcome: Progressing  4/17/2023 0155 by Erik Damon RN  Outcome: Progressing     Problem: Coping  Goal: Pt/Family able to verbalize concerns and demonstrate effective coping strategies  Description: INTERVENTIONS:  1. Assist patient/family to identify coping skills, available support systems and cultural and spiritual values  2. Provide emotional support, including active listening and acknowledgement of concerns of patient and caregivers  3. Reduce environmental stimuli, as able  4. Instruct patient/family in relaxation techniques, as appropriate  5.  Assess for spiritual pain/suffering and initiate Spiritual Care, Psychosocial Clinical Specialist consults as needed  4/17/2023 1023 by Lisa Aragon RN  Outcome: Progressing  4/17/2023 0155 by Erik Damon RN  Outcome: Progressing     Problem: Behavior  Goal: Pt/Family maintain appropriate behavior and adhere to

## 2023-04-17 NOTE — DISCHARGE INSTRUCTIONS
again. Many people have several relapses before they are able to quit for good. Follow-up care is a key part of your treatment and safety. Be sure to make and go to all appointments, and call your doctor if you are having problems. It's also a good idea to know your test results and keep a list of the medicines you take. When should you call for help? Call 911  anytime you think you may need emergency care. For example, call if:    You feel you cannot stop from hurting yourself or someone else. Call your doctor now or seek immediate medical care if:    You have serious withdrawal symptoms, such as confusion, hallucinations, severe trembling, or seizures. Watch closely for changes in your health, and be sure to contact your doctor if:    You have a relapse. You need more help or support to stop. Where can you learn more? Go to http://Memobox.miller.com/ and enter H573 to learn more about \"Substance Use Disorder: Care Instructions. \"  Current as of: August 2, 2022               Content Version: 13.6  © 2006-2023 [employer]. Care instructions adapted under license by [facility] ([facility]). If you have questions about a medical condition or this instruction, always ask your healthcare professional. [place] [address] any warranty or liability for your use of this information. Anxiety Disorder: Care Instructions  Your Care Instructions     Anxiety is a normal reaction to stress. Difficult situations can cause you to have symptoms such as sweaty palms and a nervous feeling. In an anxiety disorder, the symptoms are far more severe. Constant worry, muscle tension, trouble sleeping, nausea and diarrhea, and other symptoms can make normal daily activities difficult or impossible. These symptoms may occur for no reason, and they can affect your work, school, or social life. Medicines, counseling, and self-care can all help.   Follow-up care is a key part of your treatment

## 2023-04-17 NOTE — DISCHARGE SUMMARY
medications      naproxen 500 MG tablet  Commonly known as: Naprosyn               Where to Get Your Medications        These medications were sent to [id number] [place] [address], [place] [address]  [address], [address] [address]      Phone: [phone]   vitamin D 1.25 MG (10576 UT) Caps capsule             Activity: as tolerated  Diet: regular diet  Wound Care: none needed    Follow-up:  19 Go to [address] [place] [address] [place]  Wednesday Apr 19, 2023  Intake/therapy appt with Ronnie Corrales at 9:00am, please provide a photo id, soc sec card, insurance card and list of current medications GABRIELLE [address], [address] [facility]   [phone] option 6   [phone]([facility])       Time worked: 34 min    Participation: good    Electronically signed by Mookie Edwards MD on 4/17/2023 at 12:01 PM

## 2023-04-17 NOTE — OUTREACH NOTE
Prep Survey    Flowsheet Row Responses   [facility] facility patient discharged from? Non-[facility]   Is LACE score < 7 ? Non- Discharge   Eligibility [facility]    Date of Discharge 04/17/23   Discharge diagnosis depression   Does the patient have one of the following disease processes/diagnoses(primary or secondary)? Other   Prep survey completed? Yes          Yaneth ALCAZAR - Registered Nurse

## 2023-04-17 NOTE — PLAN OF CARE
Problem: Risk for Elopement  Goal: Patient will not exit the unit/facility without proper excort  4/17/2023 1200 by Smitha Alfonso RN  Outcome: Adequate for Discharge  4/17/2023 1023 by Smitha Alfonso RN  Outcome: Progressing  4/17/2023 0155 by Bipin Wylie RN  Outcome: Progressing     Problem: Self Harm/Suicidality  Goal: Will have no self-injury during hospital stay  Description: INTERVENTIONS:  1. Ensure constant observer at bedside with Q15M safety checks  2. Maintain a safe environment  3. Secure patient belongings  4. Ensure family/visitors adhere to safety recommendations  5. Ensure safety tray has been added to patient's diet order  6. Every shift and PRN: Re-assess suicidal risk via Frequent Screener    4/17/2023 1200 by Smitha Alfonso RN  Outcome: Adequate for Discharge  4/17/2023 1023 by Smitha Alfonso RN  Outcome: Progressing  4/17/2023 0155 by Bipin Wylie RN  Outcome: Progressing     Problem: Anxiety  Goal: Will report anxiety at manageable levels  Description: INTERVENTIONS:  1. Administer medication as ordered  2. Teach and rehearse alternative coping skills  3. Provide emotional support with 1:1 interaction with staff  4/17/2023 1200 by Smitha Alfonso RN  Outcome: Adequate for Discharge  4/17/2023 1134 by Mickie Newton  Outcome: Progressing  4/17/2023 1023 by Smitha Alfonso RN  Outcome: Progressing  4/17/2023 0155 by Bipin Wylie RN  Outcome: Progressing     Problem: Coping  Goal: Pt/Family able to verbalize concerns and demonstrate effective coping strategies  Description: INTERVENTIONS:  1. Assist patient/family to identify coping skills, available support systems and cultural and spiritual values  2. Provide emotional support, including active listening and acknowledgement of concerns of patient and caregivers  3. Reduce environmental stimuli, as able  4. Instruct patient/family in relaxation techniques, as appropriate  5.  Assess for spiritual pain/suffering and initiate

## 2023-04-17 NOTE — GROUP NOTE
Group Therapy Note    Date: 4/16/2023    Group Start Time: 1300  Group End Time: 1330  Group Topic: Education Group - Inpatient    L 6 ADULT YAMILET Mcgregor RN        Group Therapy Note    Attendees: 8/10         Notes:  medication education    Status After Intervention:  Improved    Participation Level:  Active Listener and Interactive    Participation Quality: Appropriate, Attentive, Sharing, and Supportive      Speech:  normal      Thought Process/Content: Logical  Linear      Affective Functioning: Congruent      Mood: anxious      Level of consciousness:  Alert, Oriented x4, and Attentive      Response to Learning: Able to verbalize current knowledge/experience, Able to verbalize/acknowledge new learning, Able to retain information, and Capable of insight      Modes of Intervention: Education      Discipline Responsible: Registered Nurse      Signature:  Dea Ortega RN

## 2023-04-18 ENCOUNTER — TRANSITIONAL CARE MANAGEMENT TELEPHONE ENCOUNTER (OUTPATIENT)
Dept: CALL CENTER | Facility: HOSPITAL | Age: 43
End: 2023-04-18
Payer: MEDICAID

## 2023-04-18 NOTE — OUTREACH NOTE
Call Center TCM Note    Flowsheet Row Responses   [facility] patient discharged from? Non-BH   Does the patient have one of the following disease processes/diagnoses(primary or secondary)? Other   TCM attempt successful? No   Unsuccessful attempts Attempt 1          Bernice Escamilla RN    4/18/2023, 14:30 CDT

## 2023-04-18 NOTE — OUTREACH NOTE
Call Center TCM Note    Flowsheet Row Responses   [facility] patient discharged from? Non-  [Pam]   Does the patient have one of the following disease processes/diagnoses(primary or secondary)? Other   TCM attempt successful? No   Unsuccessful attempts Attempt 2          Bernice Escamilla RN    4/18/2023, 15:14 CDT
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
## 2023-04-19 ENCOUNTER — TRANSITIONAL CARE MANAGEMENT TELEPHONE ENCOUNTER (OUTPATIENT)
Dept: CALL CENTER | Facility: HOSPITAL | Age: 43
End: 2023-04-19
Payer: MEDICAID

## 2023-04-19 NOTE — OUTREACH NOTE
Call Center TCM Note    Flowsheet Row Responses   [facility] patient discharged from? Non-BH   Does the patient have one of the following disease processes/diagnoses(primary or secondary)? Other   TCM attempt successful? Yes  [No verbal release]   Call start time 0826   Call end time 0828   Discharge diagnosis depression   Meds reviewed with patient/caregiver? Yes   Does the patient have all medications ordered at discharge? Yes   Is the patient taking all medications as directed (includes completed medication regime)? Yes   Comments Pt will call the office to schedule appt. She was on her way to an appt at time of call.    Does the patient have an appointment with their PCP within 7 days of discharge? No   Psychosocial issues? No   Did the patient receive a copy of their discharge instructions? Yes   Nursing interventions Reviewed instructions with patient   What is the patient's perception of their health status since discharge? Improving   Is the patient/caregiver able to teach back signs and symptoms related to disease process for when to call PCP? Yes   Is the patient/caregiver able to teach back signs and symptoms related to disease process for when to call 911? Yes   Is the patient/caregiver able to teach back the hierarchy of who to call/visit for symptoms/problems? PCP, Specialist, Home health nurse, Urgent Care, ED, 911 Yes   TCM call completed? Yes   Call end time 0828   Would this patient benefit from a Referral to Amb Social Work? No   Is the patient interested in additional calls from an ambulatory ?  NOTE:  applies to high risk patients requiring additional follow-up. No          Karis Cruz RN    4/19/2023, 08:28 CDT

## 2023-04-24 RX ORDER — VILAZODONE HYDROCHLORIDE 20 MG/1
TABLET ORAL
Qty: 60 TABLET | Refills: 0 | Status: SHIPPED | OUTPATIENT
Start: 2023-04-24

## 2023-04-24 NOTE — TELEPHONE ENCOUNTER
Rx Refill Note  Requested Prescriptions     Pending Prescriptions Disp Refills   • vilazodone (VIIBRYD) 20 MG tablet tablet [Pharmacy Med Name: Vilazodone HCl 20 MG Oral Tablet] 60 tablet 0     Sig: Take 1 tablet by mouth once daily      Last office visit with prescribing clinician: 2/2/2023   Next office visit with prescribing clinician: Visit date not found                         Would you like a call back once the refill request has been completed: [] Yes [] No    If the office needs to give you a call back, can they leave a voicemail: [] Yes [] No    Taylor Gabriel RN  04/24/23, 07:51 CDT

## 2023-05-15 ENCOUNTER — OFFICE VISIT (OUTPATIENT)
Dept: INTERNAL MEDICINE | Facility: CLINIC | Age: 43
End: 2023-05-15
Payer: MEDICAID

## 2023-05-15 VITALS
BODY MASS INDEX: 28.29 KG/M2 | TEMPERATURE: 97.6 F | OXYGEN SATURATION: 97 % | DIASTOLIC BLOOD PRESSURE: 69 MMHG | SYSTOLIC BLOOD PRESSURE: 102 MMHG | RESPIRATION RATE: 17 BRPM | HEIGHT: 69 IN | WEIGHT: 191 LBS | HEART RATE: 77 BPM

## 2023-05-15 DIAGNOSIS — R61 NIGHT SWEATS: ICD-10-CM

## 2023-05-15 DIAGNOSIS — N91.2 AMENORRHEA: ICD-10-CM

## 2023-05-15 DIAGNOSIS — F41.9 ANXIETY: ICD-10-CM

## 2023-05-15 DIAGNOSIS — F33.1 MODERATE EPISODE OF RECURRENT MAJOR DEPRESSIVE DISORDER: Primary | ICD-10-CM

## 2023-05-15 DIAGNOSIS — E55.9 VITAMIN D DEFICIENCY: ICD-10-CM

## 2023-05-15 DIAGNOSIS — I25.10 CORONARY ARTERY DISEASE INVOLVING NATIVE CORONARY ARTERY OF NATIVE HEART WITHOUT ANGINA PECTORIS: ICD-10-CM

## 2023-05-15 PROCEDURE — 99214 OFFICE O/P EST MOD 30 MIN: CPT | Performed by: NURSE PRACTITIONER

## 2023-05-15 RX ORDER — VILAZODONE HYDROCHLORIDE 40 MG/1
40 TABLET ORAL DAILY
Qty: 30 TABLET | Refills: 0 | Status: SHIPPED | OUTPATIENT
Start: 2023-05-15

## 2023-05-15 RX ORDER — METOPROLOL SUCCINATE 25 MG/1
25 TABLET, EXTENDED RELEASE ORAL
Qty: 30 TABLET | Refills: 0 | Status: SHIPPED | OUTPATIENT
Start: 2023-05-15

## 2023-05-15 RX ORDER — LISINOPRIL 5 MG/1
TABLET ORAL EVERY 24 HOURS
COMMUNITY

## 2023-05-15 RX ORDER — TICAGRELOR 90 MG/1
90 TABLET ORAL 2 TIMES DAILY
Qty: 60 TABLET | Refills: 1 | Status: SHIPPED | OUTPATIENT
Start: 2023-05-15

## 2023-05-15 RX ORDER — BUPROPION HYDROCHLORIDE 150 MG/1
150 TABLET ORAL DAILY
Qty: 30 TABLET | Refills: 3 | Status: SHIPPED | OUTPATIENT
Start: 2023-05-15

## 2023-05-15 RX ORDER — ERGOCALCIFEROL 1.25 MG/1
50000 CAPSULE ORAL WEEKLY
Qty: 5 CAPSULE | Refills: 1 | Status: SHIPPED | OUTPATIENT
Start: 2023-05-15

## 2023-05-15 RX ORDER — ERGOCALCIFEROL 1.25 MG/1
1 CAPSULE ORAL WEEKLY
COMMUNITY
Start: 2023-04-17 | End: 2023-05-15 | Stop reason: SDUPTHER
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
## 2023-05-19 LAB
ALBUMIN SERPL-MCNC: 4.2 G/DL (ref 3.8–4.8)
ALBUMIN/GLOB SERPL: 2.3 {RATIO} (ref 1.2–2.2)
ALP SERPL-CCNC: 69 IU/L (ref 44–121)
ALT SERPL-CCNC: 6 IU/L (ref 0–32)
AST SERPL-CCNC: 11 IU/L (ref 0–40)
BILIRUB SERPL-MCNC: 0.3 MG/DL (ref 0–1.2)
BUN SERPL-MCNC: 8 MG/DL (ref 6–24)
BUN/CREAT SERPL: 9 (ref 9–23)
CALCIUM SERPL-MCNC: 9.3 MG/DL (ref 8.7–10.2)
CHLORIDE SERPL-SCNC: 106 MMOL/L (ref 96–106)
CO2 SERPL-SCNC: 20 MMOL/L (ref 20–29)
CREAT SERPL-MCNC: 0.9 MG/DL (ref 0.57–1)
DHEA-S SERPL-MCNC: 227 UG/DL (ref 57.3–279.2)
EGFRCR SERPLBLD CKD-EPI 2021: 81 ML/MIN/1.73
ESTROGEN SERPL-MCNC: 403 PG/ML
GLOBULIN SER CALC-MCNC: 1.8 G/DL (ref 1.5–4.5)
GLUCOSE SERPL-MCNC: 92 MG/DL (ref 70–99)
INSULIN SERPL-ACNC: NORMAL UIU/ML
POTASSIUM SERPL-SCNC: 4.3 MMOL/L (ref 3.5–5.2)
PROGEST SERPL-MCNC: 0.2 NG/ML
PROT SERPL-MCNC: 6 G/DL (ref 6–8.5)
SODIUM SERPL-SCNC: 142 MMOL/L (ref 134–144)
SPECIMEN STATUS: NORMAL
TESTOST SERPL-MCNC: 22 NG/DL (ref 4–50)

## 2023-06-14 ENCOUNTER — HOSPITAL ENCOUNTER (OUTPATIENT)
Dept: CT IMAGING | Facility: HOSPITAL | Age: 43
Discharge: HOME | End: 2023-06-14
Admitting: NURSE PRACTITIONER
Payer: MEDICAID

## 2023-06-14 DIAGNOSIS — IMO0001 PULMONARY NODULE LESS THAN 6 CM DETERMINED BY COMPUTED TOMOGRAPHY OF LUNG: ICD-10-CM

## 2023-06-14 DIAGNOSIS — I25.10 CORONARY ARTERY DISEASE INVOLVING NATIVE CORONARY ARTERY OF NATIVE HEART WITHOUT ANGINA PECTORIS: ICD-10-CM

## 2023-06-14 PROCEDURE — 71250 CT THORAX DX C-: CPT

## 2023-06-14 RX ORDER — LEVOCETIRIZINE DIHYDROCHLORIDE 5 MG/1
TABLET, FILM COATED ORAL
Qty: 90 TABLET | Refills: 0 | Status: SHIPPED | OUTPATIENT
Start: 2023-06-14

## 2023-06-14 RX ORDER — TICAGRELOR 90 MG/1
TABLET ORAL
Qty: 120 TABLET | Refills: 0 | Status: SHIPPED | OUTPATIENT
Start: 2023-06-14

## 2023-06-14 NOTE — TELEPHONE ENCOUNTER
Rx Refill Note  Requested Prescriptions     Pending Prescriptions Disp Refills    levocetirizine (XYZAL) 5 MG tablet [Pharmacy Med Name: Levocetirizine Dihydrochloride 5 MG Oral Tablet] 90 tablet 0     Sig: TAKE 1 TABLET BY MOUTH ONCE DAILY IN THE EVENING    Brilinta 90 MG tablet tablet [Pharmacy Med Name: Brilinta 90 MG Oral Tablet] 120 tablet 0     Sig: Take 1 tablet by mouth twice daily      Last office visit with prescribing clinician: 5/15/2023   Last telemedicine visit with prescribing clinician: Visit date not found   Next office visit with prescribing clinician: Visit date not found                         Would you like a call back once the refill request has been completed: [] Yes [] No    If the office needs to give you a call back, can they leave a voicemail: [] Yes [] No    Barb Martin MA  06/14/23, 11:55 CDT

## 2023-06-16 DIAGNOSIS — F41.9 ANXIETY: ICD-10-CM

## 2023-06-16 DIAGNOSIS — F33.1 MODERATE EPISODE OF RECURRENT MAJOR DEPRESSIVE DISORDER: ICD-10-CM

## 2023-06-16 DIAGNOSIS — I77.810 ASCENDING AORTA DILATATION: Primary | ICD-10-CM

## 2023-06-16 RX ORDER — VILAZODONE HYDROCHLORIDE 40 MG/1
TABLET ORAL
Qty: 30 TABLET | Refills: 0 | Status: SHIPPED | OUTPATIENT
Start: 2023-06-16

## 2023-06-16 NOTE — TELEPHONE ENCOUNTER
Rx Refill Note  Requested Prescriptions     Pending Prescriptions Disp Refills    vilazodone (VIIBRYD) 40 MG tablet tablet [Pharmacy Med Name: Vilazodone HCl 40 MG Oral Tablet] 30 tablet 0     Sig: Take 1 tablet by mouth once daily      Last office visit with prescribing clinician: 5/15/2023   Last telemedicine visit with prescribing clinician: Visit date not found   Next office visit with prescribing clinician: Visit date not found                         Would you like a call back once the refill request has been completed: [] Yes [] No    If the office needs to give you a call back, can they leave a voicemail: [] Yes [] No    Taylor Gabriel RN  06/16/23, 09:26 CDT

## 2023-07-17 NOTE — TELEPHONE ENCOUNTER
Rx Refill Note  Requested Prescriptions     Pending Prescriptions Disp Refills   • albuterol sulfate  (90 Base) MCG/ACT inhaler 6.7 g 0     Sig: Inhale 2 puffs Every 4 (Four) Hours As Needed for Wheezing or Shortness of Air.      Last office visit with prescribing clinician: 5/4/2022      Next office visit with prescribing clinician: Visit date not found            Barb Martin MA  05/12/22, 15:24 CDT   show

## 2023-08-22 ENCOUNTER — OFFICE VISIT (OUTPATIENT)
Dept: FAMILY MEDICINE CLINIC | Facility: CLINIC | Age: 43
End: 2023-08-22
Payer: MEDICAID

## 2023-08-22 VITALS
DIASTOLIC BLOOD PRESSURE: 57 MMHG | TEMPERATURE: 97.9 F | SYSTOLIC BLOOD PRESSURE: 95 MMHG | WEIGHT: 195 LBS | HEART RATE: 74 BPM | HEIGHT: 70 IN | OXYGEN SATURATION: 98 % | BODY MASS INDEX: 27.92 KG/M2 | RESPIRATION RATE: 18 BRPM

## 2023-08-22 DIAGNOSIS — M54.2 ACUTE NECK PAIN: Primary | ICD-10-CM

## 2023-08-22 DIAGNOSIS — R42 DIZZINESS: ICD-10-CM

## 2023-08-22 DIAGNOSIS — E66.3 OVERWEIGHT (BMI 25.0-29.9): Chronic | ICD-10-CM

## 2023-08-22 PROBLEM — Z86.718 HISTORY OF DEEP VEIN THROMBOSIS: Status: ACTIVE | Noted: 2022-11-22

## 2023-08-22 PROBLEM — N92.1 EXCESSIVE MENSTRUATION WITH IRREGULAR CYCLE: Status: ACTIVE | Noted: 2022-11-14

## 2023-08-22 PROBLEM — D64.9 ANEMIA: Status: ACTIVE | Noted: 2022-12-30

## 2023-08-22 PROBLEM — J45.909 ASTHMA: Status: ACTIVE | Noted: 2023-08-22

## 2023-08-22 PROBLEM — I25.10 CORONARY ARTERIOSCLEROSIS: Status: ACTIVE | Noted: 2022-11-22

## 2023-08-22 PROCEDURE — 1160F RVW MEDS BY RX/DR IN RCRD: CPT | Performed by: NURSE PRACTITIONER

## 2023-08-22 PROCEDURE — 1159F MED LIST DOCD IN RCRD: CPT | Performed by: NURSE PRACTITIONER

## 2023-08-22 PROCEDURE — 99213 OFFICE O/P EST LOW 20 MIN: CPT | Performed by: NURSE PRACTITIONER

## 2023-08-22 RX ORDER — LIDOCAINE 50 MG/G
1 PATCH TOPICAL EVERY 24 HOURS
Qty: 15 PATCH | Refills: 0 | Status: SHIPPED | OUTPATIENT
Start: 2023-08-22

## 2023-08-22 NOTE — PROGRESS NOTES
Subjective     Chief Complaint   Patient presents with    Dizziness     Started today at work     Med Refill     Flexeril         History of Present Illness    Patient presents with dizziness that started at work today.  The dizziness is constant.  Denies shortness of breath, headache, chest pain, swelling in legs, arrhythmia.      She states that she started taking 800 mg ibuprofen daily a week and a half ago for neck pain caused by repetitive movement at her job.  She has noticed bruising on her bilateral thighs and arms since taking ibuprofen daily.  Patient also takes Brilinta 90 mg and aspirin 81 mg daily.  Patient is requesting flexeril for the neck pain.  She states she strained her neck and that flexeril has worked to relieve the pain in the past.  She is using biofreeze but it does not help.      She states that both of her ears hurt.  She wears hearing plugs at work and it hurts when she puts them in.  Requesting letter be sent to her job stating that she can wear external noise canceling headphones/muffs.      Patient's PMR from outside medical facility reviewed and noted.    Review of Systems   Musculoskeletal:  Positive for myalgias and neck pain.   Neurological:  Positive for dizziness.      Otherwise complete ROS reviewed and negative except as mentioned in the HPI.    Past Medical History:   Past Medical History:   Diagnosis Date    Anemia     Anxiety     Asthma     Clotting disorder     Deep vein thrombosis (DVT) of left upper extremity     Depression     Hypertension     Myocardial infarction     PTSD (post-traumatic stress disorder)     Stroke     Urinary tract infection      Past Surgical History:  Past Surgical History:   Procedure Laterality Date    CARDIAC CATHETERIZATION N/A 2022    Procedure: Left Heart Cath;  Surgeon: Saturnino Ross DO;  Location: [facility] CATH INVASIVE LOCATION;  Service: Cardiology;  Laterality: N/A;     SECTION       CORONARY STENT PLACEMENT  12/31/22    HYSTERECTOMY  11/22    TONSILLECTOMY      TUBAL ABDOMINAL LIGATION       Social History:  reports that she has been smoking cigarettes. She has a 15.00 pack-year smoking history. She has never used smokeless tobacco. She reports that she does not currently use alcohol. She reports that she does not currently use drugs.    Family History: family history includes Anxiety disorder in her mother; Asthma in her father; Breast cancer in her maternal grandmother; Depression in her mother; Heart disease in her father.      Allergies:  Allergies   Allergen Reactions    Penicillins Hives     Medications:  Prior to Admission medications    Medication Sig Start Date End Date Taking? Authorizing Provider   albuterol sulfate  (90 Base) MCG/ACT inhaler Inhale 2 puffs Every 4 (Four) Hours As Needed for Wheezing or Shortness of Air. 3/13/23  Yes Louisa Cook APRN   aspirin 81 MG EC tablet Take 1 tablet by mouth Daily. 3/13/23  Yes Saturnino Ross DO   baclofen (LIORESAL) 10 MG tablet Take 1 tablet by mouth 3 (Three) Times a Day. 5/19/22  Yes Louisa Cook APRN   Brilinta 90 MG tablet tablet Take 1 tablet by mouth twice daily 6/14/23  Yes Louisa Cook APRN   buPROPion XL (Wellbutrin XL) 150 MG 24 hr tablet Take 1 tablet by mouth Daily. 5/15/23  Yes Louisa Cook APRN   cyanocobalamin 1000 MCG/ML injection Inject 1 mL into the appropriate muscle as directed by prescriber Every 28 (Twenty-Eight) Days. 11/4/22  Yes Louisa Cook APRN   levocetirizine (XYZAL) 5 MG tablet TAKE 1 TABLET BY MOUTH ONCE DAILY IN THE EVENING 6/14/23  Yes Louisa Cook APRN   lisinopril (PRINIVIL,ZESTRIL) 5 MG tablet Daily.   Yes Provider, MD Luis   metoprolol succinate XL (TOPROL-XL) 25 MG 24 hr tablet Take 1 tablet by mouth Daily. 5/15/23  Yes Louisa Cook APRN   naloxone (NARCAN) 4 MG/0.1ML nasal spray ADMINISTER A  "SINGLE SPRAY IN ONE NOSTRIL UPON SIGNS OF OPIOID OVERDOSE. CALL 911. REPEAT AFTER 3 MINUTES IF NO RESPONSE. 11/16/22  Yes Provider, MD Luis   nitroglycerin (NITROSTAT) 0.4 MG SL tablet Take no more than 3 doses in 15 minutes. 1/9/23  Yes Louisa Cook APRN   ondansetron ODT (ZOFRAN-ODT) 8 MG disintegrating tablet ondansetron 8 mg disintegrating tablet   Yes Provider, MD Luis   rosuvastatin (CRESTOR) 20 MG tablet rosuvastatin 20 mg tablet   Yes Provider, MD Luis   vilazodone (VIIBRYD) 40 MG tablet tablet Take 1 tablet by mouth once daily 6/16/23  Yes Louisa Cook APRN   vitamin D (ERGOCALCIFEROL) 1.25 MG (50395 UT) capsule capsule Take 1 capsule by mouth 1 (One) Time Per Week. 5/15/23  Yes Louisa Cook APRN       Objective     Vital Signs: BP 95/57 (BP Location: Right arm, Patient Position: Sitting, Cuff Size: Adult)   Pulse 74   Temp 97.9 øF (36.6 øC) (Infrared)   Resp 18   Ht 177.8 cm (70\")   Wt 88.5 kg (195 lb)   SpO2 98%   BMI 27.98 kg/mý   Physical Exam  Vitals and nursing note reviewed.   Constitutional:       Appearance: She is overweight.   HENT:      Head: Normocephalic.      Right Ear: There is impacted cerumen.      Left Ear: Tympanic membrane normal.      Nose: Nose normal.      Mouth/Throat:      Mouth: Mucous membranes are moist.      Pharynx: No posterior oropharyngeal erythema.   Eyes:      Conjunctiva/sclera: Conjunctivae normal.   Cardiovascular:      Rate and Rhythm: Normal rate and regular rhythm.      Pulses: Normal pulses.      Heart sounds: Normal heart sounds.   Pulmonary:      Effort: Pulmonary effort is normal.      Breath sounds: Normal breath sounds.   Musculoskeletal:         General: Normal range of motion.      Cervical back: Normal range of motion.   Skin:     General: Skin is warm and dry.      Findings: Ecchymosis (bilateral thighs and arms) present.   Neurological:      General: No focal deficit present.      Mental " Status: She is alert and oriented to person, place, and time.   Psychiatric:         Mood and Affect: Mood normal.         Behavior: Behavior normal.         Results Reviewed:  Glucose   Date Value Ref Range Status   05/15/2023 92 70 - 99 mg/dL Final   11/08/2022 135 (H) 65 - 99 mg/dL Final     BUN   Date Value Ref Range Status   05/15/2023 8 6 - 24 mg/dL Final   11/08/2022 11 6 - 20 mg/dL Final     Creatinine   Date Value Ref Range Status   05/15/2023 0.90 0.57 - 1.00 mg/dL Final   11/08/2022 0.82 0.57 - 1.00 mg/dL Final     Sodium   Date Value Ref Range Status   05/15/2023 142 134 - 144 mmol/L Final   11/08/2022 139 136 - 145 mmol/L Final     Potassium   Date Value Ref Range Status   05/15/2023 4.3 3.5 - 5.2 mmol/L Final   11/08/2022 3.9 3.5 - 5.2 mmol/L Final     Chloride   Date Value Ref Range Status   05/15/2023 106 96 - 106 mmol/L Final   11/08/2022 107 98 - 107 mmol/L Final     CO2   Date Value Ref Range Status   11/08/2022 26.0 22.0 - 29.0 mmol/L Final     Total CO2   Date Value Ref Range Status   05/15/2023 20 20 - 29 mmol/L Final     Calcium   Date Value Ref Range Status   05/15/2023 9.3 8.7 - 10.2 mg/dL Final   11/08/2022 9.1 8.6 - 10.5 mg/dL Final     ALT (SGPT)   Date Value Ref Range Status   05/15/2023 6 0 - 32 IU/L Final   11/08/2022 6 1 - 33 U/L Final     AST (SGOT)   Date Value Ref Range Status   05/15/2023 11 0 - 40 IU/L Final   11/08/2022 13 1 - 32 U/L Final     WBC   Date Value Ref Range Status   11/08/2022 9.49 3.40 - 10.80 10*3/mm3 Final   06/02/2022 6.8 3.4 - 10.8 x10E3/uL Final     Hematocrit   Date Value Ref Range Status   11/08/2022 29.0 (L) 34.0 - 46.6 % Final     Platelets   Date Value Ref Range Status   11/08/2022 437 140 - 450 10*3/mm3 Final     Total Cholesterol   Date Value Ref Range Status   11/03/2022 148 0 - 200 mg/dL Final     Triglycerides   Date Value Ref Range Status   11/03/2022 47 0 - 150 mg/dL Final     HDL Cholesterol   Date Value Ref Range Status   11/03/2022 44 40 - 60  mg/dL Final     LDL Cholesterol    Date Value Ref Range Status   11/03/2022 94 0 - 100 mg/dL Final     LDL Chol Calc (NIH)   Date Value Ref Range Status   05/19/2022 74 0 - 99 mg/dL Final     LDL/HDL Ratio   Date Value Ref Range Status   11/03/2022 2.15  Final     Hemoglobin A1C   Date Value Ref Range Status   11/03/2022 5.20 4.80 - 5.60 % Final         Assessment / Plan     Assessment/Plan     Diagnoses and all orders for this visit:    1. Acute neck pain (Primary)  -     lidocaine (LIDODERM) 5 %; Place 1 patch on the skin as directed by provider Daily. Remove & Discard patch within 12 hours or as directed by MD  Dispense: 15 patch; Refill: 0    2. Dizziness    3. Overweight (BMI 25.0-29.9)    Assessment & Plan:  Patient instructed that if bruising and dizziness worsen and or if she experiences shortness of breath, chest pain, arrhythmia, nausea, abdominal pain, and/or blood in stool she should proceed to nearest ER.      Patient also advised to stop taking ibuprofen and NSAIDs because of increased risk of bleeding with Brilinta and aspirin.        An After Visit Summary was printed and given to the patient at discharge.  Return if symptoms worsen or fail to improve.    I have discussed the patient results/orders and plan/recommendation with them at today's visit.      Krystal Vasquez, LILLY   08/22/2023

## 2023-08-25 ENCOUNTER — TELEPHONE (OUTPATIENT)
Dept: CARDIAC SURGERY | Facility: CLINIC | Age: 43
End: 2023-08-25
Payer: MEDICAID

## 2023-09-05 DIAGNOSIS — F41.9 ANXIETY: ICD-10-CM

## 2023-09-05 DIAGNOSIS — F33.1 MODERATE EPISODE OF RECURRENT MAJOR DEPRESSIVE DISORDER: ICD-10-CM

## 2023-09-05 RX ORDER — VILAZODONE HYDROCHLORIDE 40 MG/1
TABLET ORAL
Qty: 30 TABLET | Refills: 3 | Status: SHIPPED | OUTPATIENT
Start: 2023-09-05

## 2023-09-05 NOTE — TELEPHONE ENCOUNTER
Rx Refill Note  Requested Prescriptions     Pending Prescriptions Disp Refills    vilazodone (VIIBRYD) 40 MG tablet tablet [Pharmacy Med Name: Vilazodone HCl 40 MG Oral Tablet] 30 tablet 0     Sig: Take 1 tablet by mouth once daily      Last office visit with prescribing clinician: 5/15/2023   Next office visit with prescribing clinician: Visit date not found                         Would you like a call back once the refill request has been completed: [] Yes [] No    If the office needs to give you a call back, can they leave a voicemail: [] Yes [] No    Taylor Gabriel RN  09/05/23, 10:12 CDT

## 2023-11-06 ENCOUNTER — APPOINTMENT (OUTPATIENT)
Dept: CT IMAGING | Facility: HOSPITAL | Age: 43
End: 2023-11-06

## 2023-11-06 ENCOUNTER — APPOINTMENT (OUTPATIENT)
Dept: GENERAL RADIOLOGY | Facility: HOSPITAL | Age: 43
End: 2023-11-06

## 2023-11-06 ENCOUNTER — HOSPITAL ENCOUNTER (EMERGENCY)
Facility: HOSPITAL | Age: 43
Discharge: HOME | End: 2023-11-06
Attending: EMERGENCY MEDICINE | Admitting: EMERGENCY MEDICINE

## 2023-11-06 VITALS
WEIGHT: 179.6 LBS | SYSTOLIC BLOOD PRESSURE: 118 MMHG | HEIGHT: 70 IN | TEMPERATURE: 98.1 F | DIASTOLIC BLOOD PRESSURE: 76 MMHG | RESPIRATION RATE: 18 BRPM | HEART RATE: 72 BPM | BODY MASS INDEX: 25.71 KG/M2 | OXYGEN SATURATION: 99 %

## 2023-11-06 DIAGNOSIS — R07.9 CHEST PAIN, UNSPECIFIED TYPE: Primary | ICD-10-CM

## 2023-11-06 DIAGNOSIS — R20.2 ARM PARESTHESIA, LEFT: ICD-10-CM

## 2023-11-06 DIAGNOSIS — F19.90 RECREATIONAL DRUG USE: ICD-10-CM

## 2023-11-06 LAB
ALBUMIN SERPL-MCNC: 4.1 G/DL (ref 3.5–5.2)
ALBUMIN/GLOB SERPL: 1.9 G/DL
ALP SERPL-CCNC: 62 U/L (ref 39–117)
ALT SERPL W P-5'-P-CCNC: <5 U/L (ref 1–33)
AMPHET+METHAMPHET UR QL: POSITIVE
AMPHETAMINES UR QL: POSITIVE
ANION GAP SERPL CALCULATED.3IONS-SCNC: 9 MMOL/L (ref 5–15)
AST SERPL-CCNC: 13 U/L (ref 1–32)
BARBITURATES UR QL SCN: NEGATIVE
BASOPHILS # BLD AUTO: 0.08 10*3/MM3 (ref 0–0.2)
BASOPHILS NFR BLD AUTO: 1.1 % (ref 0–1.5)
BENZODIAZ UR QL SCN: NEGATIVE
BILIRUB SERPL-MCNC: 0.7 MG/DL (ref 0–1.2)
BUN SERPL-MCNC: 8 MG/DL (ref 6–20)
BUN/CREAT SERPL: 10.3 (ref 7–25)
BUPRENORPHINE SERPL-MCNC: NEGATIVE NG/ML
CALCIUM SPEC-SCNC: 9.1 MG/DL (ref 8.6–10.5)
CANNABINOIDS SERPL QL: POSITIVE
CHLORIDE SERPL-SCNC: 107 MMOL/L (ref 98–107)
CO2 SERPL-SCNC: 24 MMOL/L (ref 22–29)
COCAINE UR QL: NEGATIVE
CREAT SERPL-MCNC: 0.78 MG/DL (ref 0.57–1)
D DIMER PPP FEU-MCNC: <0.27 MCGFEU/ML (ref 0–0.5)
DEPRECATED RDW RBC AUTO: 55.8 FL (ref 37–54)
EGFRCR SERPLBLD CKD-EPI 2021: 96.8 ML/MIN/1.73
EOSINOPHIL # BLD AUTO: 0.32 10*3/MM3 (ref 0–0.4)
EOSINOPHIL NFR BLD AUTO: 4.4 % (ref 0.3–6.2)
ERYTHROCYTE [DISTWIDTH] IN BLOOD BY AUTOMATED COUNT: 17.2 % (ref 12.3–15.4)
FENTANYL UR-MCNC: NEGATIVE NG/ML
GEN 5 2HR TROPONIN T REFLEX: <6 NG/L
GLOBULIN UR ELPH-MCNC: 2.2 GM/DL
GLUCOSE SERPL-MCNC: 128 MG/DL (ref 65–99)
HCT VFR BLD AUTO: 40.5 % (ref 34–46.6)
HGB BLD-MCNC: 12.2 G/DL (ref 12–15.9)
HOLD SPECIMEN: NORMAL
HOLD SPECIMEN: NORMAL
IMM GRANULOCYTES # BLD AUTO: 0.02 10*3/MM3 (ref 0–0.05)
IMM GRANULOCYTES NFR BLD AUTO: 0.3 % (ref 0–0.5)
LYMPHOCYTES # BLD AUTO: 1.84 10*3/MM3 (ref 0.7–3.1)
LYMPHOCYTES NFR BLD AUTO: 25.1 % (ref 19.6–45.3)
MCH RBC QN AUTO: 26.8 PG (ref 26.6–33)
MCHC RBC AUTO-ENTMCNC: 30.1 G/DL (ref 31.5–35.7)
MCV RBC AUTO: 88.8 FL (ref 79–97)
METHADONE UR QL SCN: NEGATIVE
MONOCYTES # BLD AUTO: 0.43 10*3/MM3 (ref 0.1–0.9)
MONOCYTES NFR BLD AUTO: 5.9 % (ref 5–12)
NEUTROPHILS NFR BLD AUTO: 4.63 10*3/MM3 (ref 1.7–7)
NEUTROPHILS NFR BLD AUTO: 63.2 % (ref 42.7–76)
NRBC BLD AUTO-RTO: 0 /100 WBC (ref 0–0.2)
NT-PROBNP SERPL-MCNC: 427.5 PG/ML (ref 0–450)
OPIATES UR QL: NEGATIVE
OXYCODONE UR QL SCN: NEGATIVE
PCP UR QL SCN: NEGATIVE
PLATELET # BLD AUTO: 287 10*3/MM3 (ref 140–450)
PMV BLD AUTO: 11.3 FL (ref 6–12)
POTASSIUM SERPL-SCNC: 3.8 MMOL/L (ref 3.5–5.2)
PROT SERPL-MCNC: 6.3 G/DL (ref 6–8.5)
RBC # BLD AUTO: 4.56 10*6/MM3 (ref 3.77–5.28)
SODIUM SERPL-SCNC: 140 MMOL/L (ref 136–145)
TRICYCLICS UR QL SCN: NEGATIVE
TROPONIN T DELTA: NORMAL
TROPONIN T SERPL HS-MCNC: <6 NG/L
WBC NRBC COR # BLD: 7.32 10*3/MM3 (ref 3.4–10.8)
WHOLE BLOOD HOLD COAG: NORMAL
WHOLE BLOOD HOLD SPECIMEN: NORMAL

## 2023-11-06 PROCEDURE — 80053 COMPREHEN METABOLIC PANEL: CPT | Performed by: EMERGENCY MEDICINE

## 2023-11-06 PROCEDURE — 36415 COLL VENOUS BLD VENIPUNCTURE: CPT

## 2023-11-06 PROCEDURE — 85025 COMPLETE CBC W/AUTO DIFF WBC: CPT | Performed by: EMERGENCY MEDICINE

## 2023-11-06 PROCEDURE — 25510000001 IOPAMIDOL PER 1 ML: Performed by: EMERGENCY MEDICINE

## 2023-11-06 PROCEDURE — 83880 ASSAY OF NATRIURETIC PEPTIDE: CPT | Performed by: EMERGENCY MEDICINE

## 2023-11-06 PROCEDURE — 71045 X-RAY EXAM CHEST 1 VIEW: CPT

## 2023-11-06 PROCEDURE — 85379 FIBRIN DEGRADATION QUANT: CPT | Performed by: EMERGENCY MEDICINE

## 2023-11-06 PROCEDURE — 99285 EMERGENCY DEPT VISIT HI MDM: CPT

## 2023-11-06 PROCEDURE — 70450 CT HEAD/BRAIN W/O DYE: CPT

## 2023-11-06 PROCEDURE — 84484 ASSAY OF TROPONIN QUANT: CPT | Performed by: EMERGENCY MEDICINE

## 2023-11-06 PROCEDURE — 80307 DRUG TEST PRSMV CHEM ANLYZR: CPT | Performed by: EMERGENCY MEDICINE

## 2023-11-06 PROCEDURE — 71275 CT ANGIOGRAPHY CHEST: CPT

## 2023-11-06 PROCEDURE — 93005 ELECTROCARDIOGRAM TRACING: CPT | Performed by: EMERGENCY MEDICINE

## 2023-11-06 RX ORDER — ASPIRIN 81 MG/1
324 TABLET, CHEWABLE ORAL ONCE
Status: DISCONTINUED | OUTPATIENT
Start: 2023-11-06 | End: 2023-11-06 | Stop reason: HOSPADM

## 2023-11-06 RX ORDER — SODIUM CHLORIDE 0.9 % (FLUSH) 0.9 %
10 SYRINGE (ML) INJECTION AS NEEDED
Status: DISCONTINUED | OUTPATIENT
Start: 2023-11-06 | End: 2023-11-06 | Stop reason: HOSPADM

## 2023-11-06 RX ADMIN — IOPAMIDOL 100 ML: 755 INJECTION, SOLUTION INTRAVENOUS at 13:59

## 2023-11-06 NOTE — ED PROVIDER NOTES
Subjective   History of Present Illness  Patient not a good historian 43-year-old with chest pain substernal burning.  Has got history of venoocclusive disease and cardiac disease has been cath in 2022 is on Brilinta.    Chest Pain  Pain location:  Substernal area  Pain quality: aching and burning    Pain radiates to:  Does not radiate  Pain severity:  Moderate  Timing:  Constant  Progression:  Worsening  Chronicity:  New  Context: not breathing, not drug use, not intercourse, not lifting, not raising an arm, not at rest and not stress    Relieved by:  Nothing  Worsened by:  Nothing  Ineffective treatments:  None tried  Associated symptoms: no abdominal pain, no AICD problem, no anorexia, no anxiety, no back pain, no claudication, no cough, no diaphoresis, no dizziness, no dysphagia, no fatigue, no fever, no headache, no lower extremity edema, no nausea, no numbness, no orthopnea, no palpitations, no syncope, no vomiting and no weakness    Risk factors: coronary artery disease, high cholesterol, hypertension and prior DVT/PE    Risk factors: no aortic disease, no Marie-Danlos syndrome, not male and no Marfan's syndrome        Review of Systems   Constitutional: Negative.  Negative for activity change, appetite change, chills, diaphoresis, fatigue and fever.   HENT:  Negative for congestion, drooling, ear pain, facial swelling, hearing loss, sinus pressure, sore throat and trouble swallowing.    Eyes: Negative.  Negative for discharge.   Respiratory:  Negative for cough.    Cardiovascular:  Positive for chest pain. Negative for palpitations, orthopnea, claudication and syncope.   Gastrointestinal:  Negative for abdominal distention, abdominal pain, anorexia, blood in stool, diarrhea, nausea and vomiting.   Endocrine: Negative.  Negative for cold intolerance, heat intolerance, polydipsia, polyphagia and polyuria.   Genitourinary: Negative.  Negative for dysuria, flank pain and urgency.   Musculoskeletal: Negative.   Negative for arthralgias, back pain, myalgias and neck stiffness.   Skin: Negative.  Negative for color change, pallor and rash.   Allergic/Immunologic: Negative.    Neurological: Negative.  Negative for dizziness, seizures, speech difficulty, weakness, numbness and headaches.   Hematological: Negative.  Negative for adenopathy.   All other systems reviewed and are negative.      Past Medical History:   Diagnosis Date    Anemia     Anxiety     Asthma     Clotting disorder     Deep vein thrombosis (DVT) of left upper extremity     Depression     Hypertension     Myocardial infarction     PTSD (post-traumatic stress disorder)     Stroke     Urinary tract infection        Allergies   Allergen Reactions    Penicillins Hives       Past Surgical History:   Procedure Laterality Date    CARDIAC CATHETERIZATION N/A 2022    Procedure: Left Heart Cath;  Surgeon: Saturnino Ross DO;  Location:  [facility] CATH INVASIVE LOCATION;  Service: Cardiology;  Laterality: N/A;     SECTION      CORONARY STENT PLACEMENT  22    HYSTERECTOMY      TONSILLECTOMY      TUBAL ABDOMINAL LIGATION         Family History   Problem Relation Age of Onset    Heart disease Father     Asthma Father     Anxiety disorder Mother     Depression Mother     Breast cancer Maternal Grandmother     Ovarian cancer Neg Hx     Uterine cancer Neg Hx     Colon cancer Neg Hx     Melanoma Neg Hx        Social History     Socioeconomic History    Marital status:    Tobacco Use    Smoking status: Every Day     Packs/day: 1.00     Years: 15.00     Additional pack years: 0.00     Total pack years: 15.00     Types: Cigarettes    Smokeless tobacco: Never   Vaping Use    Vaping Use: Never used   Substance and Sexual Activity    Alcohol use: Not Currently    Drug use: Not Currently    Sexual activity: Yes     Partners: Male     Birth control/protection: Tubal ligation, Hysterectomy     Comment:             Objective   Physical Exam  Vitals and nursing note reviewed. Exam conducted with a chaperone present.   Constitutional:       General: She is not in acute distress.     Appearance: Normal appearance. She is well-developed. She is not toxic-appearing or diaphoretic.   HENT:      Head: Normocephalic and atraumatic.      Right Ear: External ear normal.      Nose: Nose normal.      Mouth/Throat:      Mouth: Mucous membranes are moist.   Eyes:      Conjunctiva/sclera: Conjunctivae normal.      Pupils: Pupils are equal, round, and reactive to light.   Cardiovascular:      Rate and Rhythm: Normal rate and regular rhythm.      Chest Wall: PMI is not displaced.      Pulses: Normal pulses. No decreased pulses.      Heart sounds: Normal heart sounds. No murmur heard.  Pulmonary:      Effort: Pulmonary effort is normal. No tachypnea, accessory muscle usage or respiratory distress.      Breath sounds: Normal breath sounds. No stridor. No decreased breath sounds, wheezing, rhonchi or rales.   Chest:      Chest wall: No tenderness or crepitus.   Abdominal:      General: Bowel sounds are normal. There is no distension.      Palpations: Abdomen is soft.      Tenderness: There is no abdominal tenderness.   Musculoskeletal:         General: No swelling or tenderness. Normal range of motion.      Cervical back: Normal range of motion and neck supple. No rigidity.      Comments: Lower extremity exam bilaterally is unremarkable.  There is no right or left calf tenderness .  There is no palpable venous cord.  No obvious difference in the size of the legs.  No pitting edema.  The dorsalis pedis and posterior tibial femoral and popliteal pulses are palpable and +2 bilaterally.  Homans sign is negative   Skin:     General: Skin is warm.      Capillary Refill: Capillary refill takes less than 2 seconds.      Coloration: Skin is not jaundiced.      Findings: No erythema or rash.   Neurological:      General: No focal deficit present.       Mental Status: She is alert and oriented to person, place, and time. Mental status is at baseline.      GCS: GCS eye subscore is 4. GCS verbal subscore is 5. GCS motor subscore is 6.      Cranial Nerves: Cranial nerves 2-12 are intact. No cranial nerve deficit, dysarthria or facial asymmetry.      Motor: No weakness, tremor, atrophy or seizure activity.      Coordination: Coordination normal.      Deep Tendon Reflexes: Reflexes are normal and symmetric. Reflexes normal.      Comments: Patient is complaining of some numbness in the left arm but the motor system examination left arm within normal limits.   Psychiatric:         Mood and Affect: Mood normal.         Procedures           ED Course  ED Course as of 11/06/23 1748   Mon Nov 06, 2023   1256 : Left main is a large-caliber vessel that bifurcates into the LAD and left circumflex coronary arteries, no angiographic evidence of stenosis, MICHELLE-3 flow     LAD: The LAD is a moderate caliber vessel with 30 to 40% stenosis in the proximal segment, minor disease in the mid vessel, the distal vessel is small caliber and has 100% stenosis prior to the vessel wrapping around the apex, MICHELLE-3 flow proximal to the 100% stenosis with MICHELLE 0 flow distal to the 100% stenosis distally. Status post PTCA, we still had 100% stenosis and MICHELLE 0 flow.      Diagonals: The first diagonal is small to moderate caliber with no significant disease, the second diagonal is small caliber with no significant disease     Left circumflex: Left circumflex is a very large, dominant vessel with no angiographic evidence of stenosis, MICHELLE-3 flow     Obtuse marginals: The first obtuse marginal is a moderate caliber vessel with no significant disease, the second obtuse marginal is moderate caliber with no significant disease     PDA/CECY: Moderate caliber with no significant disease     RCA: The right coronary artery is a moderate caliber, nondominant vessel with      Estimated Blood Loss: Minimal    Cardiac cath in 2022 [TS]   1256 Left ventricular systolic function is normal. Left ventricular ejection fraction appears to be 61 - 65%.  ·  Normal right ventricular cavity size and systolic function noted.  ·  No evidence of pulmonary hypertension is present.  ·  The following segments are akinetic: mid anterior, apical anterior, apical septal and apex.  ·  No hemodynamically significant valvular disease.  Echo in 2020 [TS]   1308 nsr [TS]   1439 Sinus geovani [TS]   1736 Work-up was discussed with the patient.  I have offered her to stay and get a stress test.  The patient does not want to do that her and her friend decided that they want to go home.  The possibility of increased morbidity mortality has been explained.  We will discharge him home with outpatient stress test recommendation to the ER for any worsening symptoms.  She also want excuse from from work for 3 days patient was having some numbness in the left arm which has resolved at this time.  CT of the head is negative. [TS]   1737 Heart score 2  PERC score 0 [TS]      ED Course User Index  [TS] Anthony Edge MD                      HEART Score: 1                      Medical Decision Making  Differential Diagnosis:  I considered chest wall pain, muscle strain, costochondritis, pleurisy, rib fracture, herpes zoster, cardiovascular etiology, myocardial infarction, intermediate coronary syndrome, unstable angina, angina, aortic dissection, pericarditis, pulmonary etiology, pulmonary embolism, pneumonia, pneumothorax, lung cancer, gastroesophageal reflux disease, esophagitis, esophageal spasm and gastrointestinal etiology as a possible cause of chest pain in this patient. This is a partial list of diagnoses considered.        Problems Addressed:  Arm paresthesia, left: complicated acute illness or injury     Details: Nonspecific seizure in the arm CT of the head is negative.  Having some numbness.  Which completely resolved neurovascular lamination of  the arm is unremarkable.  There is no evidence of any DVT.  Radial ulnar pulse are palpable.  Chest pain, unspecified type: complicated acute illness or injury     Details: Nonspecific chest pain has got history of cardiac disease cardiac markers are negative.  Recreational drug use: complicated acute illness or injury    Amount and/or Complexity of Data Reviewed  Labs: ordered.     Details: Labs reviewed  Radiology: ordered.     Details: CAT scan was reviewed  ECG/medicine tests: ordered.     Details: Nonischemic    Risk  OTC drugs.  Prescription drug management.  Risk Details: CT of the chest angio was obtained to rule out aortic dissection because she states that she had a history of that the CT of the chest is negative for dissection or aortic aneurysm.    Heart score is 2    Well score is 0    Years Algorithm for Pulmonary Embolus  To be used in hemodynamically stable patient >18 years old    No signs of DVT are present, there is no hemoptysis, PE is not the most likely diagnosis and the D-dimer is not greater or equal to 1000 ng/mL. Therefore PE is excluded . The Years algorithm rules out PE (0.43 % with symptomatic VTE during 3-month follow-up)    Patient is going be discharged home    This patient presents with chest pain , patient arrived hemodynamically stable was placed on the monitor and IV access obtained EKG was obtained and did not reveal any malignant/unstable dysrhythmias any acute ST elevation, no evidence of Brugada, or significantly prolonged QT .  Presentation not consistent with other acute, emergent cause of chest pain at this time.  Low suspicion for acute PE is low risk per Wells and Years criteria and no evidence of DVT such as calf swelling, tenderness, palpable tortuous lower extremity vein, or Homans' sign.  Low suspicion for pneumothorax as the patient is saturating well and has no radiographic evidence of a pneumothorax.  Low suspicion for dissection there is no widened mediastinum,  hypotension, pulses deficit, and no tearing back/abdominal pain.  Low suspicion for tamponade as there is no JVD, muffled heart sounds, electrical alternans on EKG, and no hypotension.  Low suspicion for pericarditis as there is no diffuse ST elevation or [place] depression and the patient is afebrile.  No evidence of GI bleed per patient's history.  Low suspicion for CHF exacerbation as there is no evidence of volume overload and no evidence of severely elevated BNP.  Low suspicion for pneumonia since the patient has no fever no productive cough no chest x-ray findings of pneumonia and no leukocytosis.         Final diagnoses:   Chest pain, unspecified type   Arm paresthesia, left   Recreational drug use       ED Disposition  ED Disposition       ED Disposition   Discharge    Condition   Stable    Comment   --               Louisa Cook, APRN  [address] [facility] [address] [phone]    In 2 days           Medication List      No changes were made to your prescriptions during this visit.            Anthony Edge MD  11/06/23 1741       Anthony Edge MD  11/06/23 174

## 2023-11-06 NOTE — Clinical Note
[facility] EMERGENCY DEPARTMENT  [address]  [facility] [address]  Phone: [phone]    Mark Huddleston was seen and treated in our emergency department on 11/6/2023.  She may return to work on 11/09/2023.         Thank you for choosing [facility].    Anthony Edge MD

## 2023-11-07 LAB
QT INTERVAL: 426 MS
QT INTERVAL: 464 MS
QTC INTERVAL: 443 MS
QTC INTERVAL: 443 MS

## 2023-11-27 RX ORDER — LISINOPRIL 5 MG/1
5 TABLET ORAL DAILY
Qty: 90 TABLET | Refills: 0 | Status: SHIPPED | OUTPATIENT
Start: 2023-11-27

## 2023-11-27 NOTE — TELEPHONE ENCOUNTER
Rx Refill Note  Requested Prescriptions     Pending Prescriptions Disp Refills    lisinopril (PRINIVIL,ZESTRIL) 5 MG tablet [Pharmacy Med Name: Lisinopril 5 MG Oral Tablet] 90 tablet 0     Sig: Take 1 tablet by mouth once daily      Last office visit with prescribing clinician: 5/15/2023   Last telemedicine visit with prescribing clinician: Visit date not found   Next office visit with prescribing clinician: Visit date not found                         Would you like a call back once the refill request has been completed: [] Yes [] No    If the office needs to give you a call back, can they leave a voicemail: [] Yes [] No    Taylor Gabriel RN  11/27/23, 07:05 CST

## 2024-05-10 DIAGNOSIS — G89.29 CHRONIC NECK PAIN: ICD-10-CM

## 2024-05-10 DIAGNOSIS — F33.1 MODERATE EPISODE OF RECURRENT MAJOR DEPRESSIVE DISORDER: ICD-10-CM

## 2024-05-10 DIAGNOSIS — E55.9 VITAMIN D DEFICIENCY: ICD-10-CM

## 2024-05-10 DIAGNOSIS — F41.9 ANXIETY: ICD-10-CM

## 2024-05-10 DIAGNOSIS — Z72.0 TOBACCO ABUSE: ICD-10-CM

## 2024-05-10 DIAGNOSIS — E53.8 B12 DEFICIENCY: ICD-10-CM

## 2024-05-10 DIAGNOSIS — M54.2 CHRONIC NECK PAIN: ICD-10-CM

## 2024-05-10 DIAGNOSIS — M54.2 ACUTE NECK PAIN: ICD-10-CM

## 2024-05-10 DIAGNOSIS — I25.10 CORONARY ARTERY DISEASE INVOLVING NATIVE CORONARY ARTERY OF NATIVE HEART WITHOUT ANGINA PECTORIS: ICD-10-CM

## 2024-05-10 RX ORDER — LISINOPRIL 5 MG/1
5 TABLET ORAL DAILY
Qty: 90 TABLET | Refills: 0 | OUTPATIENT
Start: 2024-05-10

## 2024-05-10 RX ORDER — METOPROLOL SUCCINATE 25 MG/1
25 TABLET, EXTENDED RELEASE ORAL
Qty: 30 TABLET | Refills: 0 | OUTPATIENT
Start: 2024-05-10

## 2024-05-10 RX ORDER — ONDANSETRON 8 MG/1
TABLET, ORALLY DISINTEGRATING ORAL
OUTPATIENT
Start: 2024-05-10

## 2024-05-10 RX ORDER — LIDOCAINE 50 MG/G
1 PATCH TOPICAL EVERY 24 HOURS
Qty: 15 PATCH | Refills: 0 | Status: SHIPPED | OUTPATIENT
Start: 2024-05-10

## 2024-05-10 RX ORDER — BACLOFEN 10 MG/1
10 TABLET ORAL 3 TIMES DAILY
Qty: 90 TABLET | Refills: 1 | OUTPATIENT
Start: 2024-05-10

## 2024-05-10 RX ORDER — LEVOCETIRIZINE DIHYDROCHLORIDE 5 MG/1
5 TABLET, FILM COATED ORAL EVERY EVENING
Qty: 90 TABLET | Refills: 0 | OUTPATIENT
Start: 2024-05-10

## 2024-05-10 RX ORDER — CYANOCOBALAMIN 1000 UG/ML
1000 INJECTION, SOLUTION INTRAMUSCULAR; SUBCUTANEOUS
Qty: 10 ML | Refills: 0 | OUTPATIENT
Start: 2024-05-10

## 2024-05-10 RX ORDER — ERGOCALCIFEROL 1.25 MG/1
50000 CAPSULE ORAL WEEKLY
Qty: 5 CAPSULE | Refills: 1 | OUTPATIENT
Start: 2024-05-10

## 2024-05-10 RX ORDER — NITROGLYCERIN 0.4 MG/1
TABLET SUBLINGUAL
Qty: 25 TABLET | Refills: 0 | OUTPATIENT
Start: 2024-05-10

## 2024-05-10 RX ORDER — VILAZODONE HYDROCHLORIDE 40 MG/1
40 TABLET ORAL DAILY
Qty: 30 TABLET | Refills: 3 | OUTPATIENT
Start: 2024-05-10

## 2024-05-10 RX ORDER — ROSUVASTATIN CALCIUM 20 MG/1
TABLET, COATED ORAL
Qty: 90 TABLET | OUTPATIENT
Start: 2024-05-10

## 2024-05-10 RX ORDER — BUPROPION HYDROCHLORIDE 150 MG/1
150 TABLET ORAL DAILY
Qty: 30 TABLET | Refills: 3 | OUTPATIENT
Start: 2024-05-10

## 2024-05-10 RX ORDER — TICAGRELOR 90 MG/1
90 TABLET ORAL 2 TIMES DAILY
Qty: 120 TABLET | Refills: 0 | OUTPATIENT
Start: 2024-05-10

## 2024-05-10 RX ORDER — ALBUTEROL SULFATE 90 UG/1
2 AEROSOL, METERED RESPIRATORY (INHALATION) EVERY 4 HOURS PRN
Qty: 6.7 G | Refills: 6 | OUTPATIENT
Start: 2024-05-10

## 2024-07-01 ENCOUNTER — HOSPITAL ENCOUNTER (EMERGENCY)
Age: 44
Discharge: HOME | End: 2024-07-01
Payer: MEDICAID

## 2024-07-01 ENCOUNTER — APPOINTMENT (OUTPATIENT)
Dept: GENERAL RADIOLOGY | Age: 44
End: 2024-07-01
Payer: MEDICAID

## 2024-07-01 VITALS
SYSTOLIC BLOOD PRESSURE: 142 MMHG | RESPIRATION RATE: 12 BRPM | OXYGEN SATURATION: 96 % | DIASTOLIC BLOOD PRESSURE: 85 MMHG | TEMPERATURE: 97.9 F | HEIGHT: 70 IN | WEIGHT: 165 LBS | BODY MASS INDEX: 23.62 KG/M2 | HEART RATE: 79 BPM

## 2024-07-01 DIAGNOSIS — R20.0 NUMBNESS: Primary | ICD-10-CM

## 2024-07-01 DIAGNOSIS — R07.89 ATYPICAL CHEST PAIN: ICD-10-CM

## 2024-07-01 LAB
ALBUMIN SERPL-MCNC: 3.9 G/DL (ref 3.5–5.2)
ALP SERPL-CCNC: 75 U/L (ref 35–104)
ALT SERPL-CCNC: 5 U/L (ref 5–33)
ANION GAP SERPL CALCULATED.3IONS-SCNC: 13 MMOL/L (ref 7–19)
AST SERPL-CCNC: 11 U/L (ref 5–32)
BASOPHILS # BLD: 0.1 K/UL (ref 0–0.2)
BASOPHILS NFR BLD: 0.7 % (ref 0–1)
BILIRUB SERPL-MCNC: 0.8 MG/DL (ref 0.2–1.2)
BUN SERPL-MCNC: 10 MG/DL (ref 6–20)
CALCIUM SERPL-MCNC: 8.9 MG/DL (ref 8.6–10)
CHLORIDE SERPL-SCNC: 102 MMOL/L (ref 98–111)
CO2 SERPL-SCNC: 22 MMOL/L (ref 22–29)
CREAT SERPL-MCNC: 1 MG/DL (ref 0.5–0.9)
EKG P AXIS: 56 DEGREES
EKG P-R INTERVAL: 132 MS
EKG Q-T INTERVAL: 368 MS
EKG QRS DURATION: 84 MS
EKG QTC CALCULATION (BAZETT): 408 MS
EKG T AXIS: 39 DEGREES
EOSINOPHIL # BLD: 0.2 K/UL (ref 0–0.6)
EOSINOPHIL NFR BLD: 1.6 % (ref 0–5)
ERYTHROCYTE [DISTWIDTH] IN BLOOD BY AUTOMATED COUNT: 14 % (ref 11.5–14.5)
GLUCOSE SERPL-MCNC: 94 MG/DL (ref 74–109)
HCG SERPL QL: NEGATIVE
HCT VFR BLD AUTO: 42.8 % (ref 37–47)
HGB BLD-MCNC: 13.6 G/DL (ref 12–16)
IMM GRANULOCYTES # BLD: 0 K/UL
LYMPHOCYTES # BLD: 1.7 K/UL (ref 1.1–4.5)
LYMPHOCYTES NFR BLD: 15.9 % (ref 20–40)
MCH RBC QN AUTO: 30.7 PG (ref 27–31)
MCHC RBC AUTO-ENTMCNC: 31.8 G/DL (ref 33–37)
MCV RBC AUTO: 96.6 FL (ref 81–99)
MONOCYTES # BLD: 0.8 K/UL (ref 0–0.9)
MONOCYTES NFR BLD: 7.4 % (ref 0–10)
NEUTROPHILS # BLD: 7.8 K/UL (ref 1.5–7.5)
NEUTS SEG NFR BLD: 74.1 % (ref 50–65)
PLATELET # BLD AUTO: 223 K/UL (ref 130–400)
PMV BLD AUTO: 10.7 FL (ref 9.4–12.3)
POTASSIUM SERPL-SCNC: 3.6 MMOL/L (ref 3.5–5)
PROT SERPL-MCNC: 6.5 G/DL (ref 6.6–8.7)
RBC # BLD AUTO: 4.43 M/UL (ref 4.2–5.4)
SODIUM SERPL-SCNC: 137 MMOL/L (ref 136–145)
TROPONIN, HIGH SENSITIVITY: <6 NG/L (ref 0–14)
WBC # BLD AUTO: 10.5 K/UL (ref 4.8–10.8)

## 2024-07-01 PROCEDURE — 71045 X-RAY EXAM CHEST 1 VIEW: CPT

## 2024-07-01 PROCEDURE — 80053 COMPREHEN METABOLIC PANEL: CPT

## 2024-07-01 PROCEDURE — 84484 ASSAY OF TROPONIN QUANT: CPT

## 2024-07-01 PROCEDURE — 99285 EMERGENCY DEPT VISIT HI MDM: CPT

## 2024-07-01 PROCEDURE — 93005 ELECTROCARDIOGRAM TRACING: CPT | Performed by: EMERGENCY MEDICINE

## 2024-07-01 PROCEDURE — 84703 CHORIONIC GONADOTROPIN ASSAY: CPT

## 2024-07-01 PROCEDURE — 93010 ELECTROCARDIOGRAM REPORT: CPT | Performed by: INTERNAL MEDICINE

## 2024-07-01 PROCEDURE — 85025 COMPLETE CBC W/AUTO DIFF WBC: CPT

## 2024-07-01 PROCEDURE — 36415 COLL VENOUS BLD VENIPUNCTURE: CPT

## 2024-07-01 RX ORDER — DICLOFENAC SODIUM 75 MG/1
75 TABLET, DELAYED RELEASE ORAL 2 TIMES DAILY
Qty: 60 TABLET | Refills: 3 | Status: SHIPPED | OUTPATIENT
Start: 2024-07-01

## 2024-07-01 ASSESSMENT — PAIN SCALES - GENERAL: PAINLEVEL_OUTOF10: 6

## 2024-07-01 ASSESSMENT — PAIN - FUNCTIONAL ASSESSMENT: PAIN_FUNCTIONAL_ASSESSMENT: 0-10

## 2024-07-01 ASSESSMENT — PAIN DESCRIPTION - LOCATION: LOCATION: CHEST

## 2024-07-01 ASSESSMENT — ENCOUNTER SYMPTOMS
SHORTNESS OF BREATH: 0
GASTROINTESTINAL NEGATIVE: 1
CHEST TIGHTNESS: 1
COUGH: 1

## 2024-07-02 NOTE — DISCHARGE INSTRUCTIONS
Do your best to stop smoking.  Diclofenac as prescribed.  Follow-up with your doctor if arm numbness persists.  Be sure you are drinking plenty water.Return to ER for any new, worsening, or change in condition.

## 2024-07-02 NOTE — ED PROVIDER NOTES
[facility] EMERGENCY DEPT  EMERGENCY DEPARTMENT ENCOUNTER      Pt Name: Richard Baker  MRN: [id number]  Birthdate 1980  Date of evaluation: 2024  Provider: FRANDY Tate NP    CHIEF COMPLAINT       Chief Complaint   Patient presents with    Chest Pain     Pt describes it as chest tightness    Numbness     Left arm numbness x2 days         HISTORY OF PRESENT ILLNESS   (Location/Symptom, Timing/Onset,Context/Setting, Quality, Duration, Modifying Factors, Severity)  Note limiting factors.   Richard Baker is a 44 y.o. female who presents to the emergency department complaint of chest tightness and left arm numbness for 2 days.  She has a cardiac history and has had a stent placement in the past.  Other medical history includes depression and anxiety, hypertension, asthma, MI, and hypercholesterolemia.  She denies any recent illness.        The history is provided by the patient.       NursingNotes were reviewed.    REVIEW OF SYSTEMS    (2-9 systems for level 4, 10 or more for level 5)     Review of Systems   Constitutional:  Negative for appetite change, chills and fever.   HENT: Negative.     Respiratory:  Positive for cough and chest tightness. Negative for shortness of breath.    Cardiovascular:  Positive for leg swelling.        She reports lower extremity swelling that is usually resolved by morning.   Gastrointestinal: Negative.    Genitourinary: Negative.    Musculoskeletal: Negative.    Neurological: Negative.    Psychiatric/Behavioral: Negative.     All other systems reviewed and are negative.      A complete review of systems was performed and is negative except as noted above in the HPI.       PAST MEDICAL HISTORY     Past Medical History:   Diagnosis Date    Asthma     Hypertension     MI (myocardial infarction) (HCC)          SURGICAL HISTORY       Past Surgical History:   Procedure Laterality Date     SECTION      x3    CORONARY ANGIOPLASTY WITH STENT PLACEMENT      HYSTERECTOMY

## 2024-07-02 NOTE — ED PROVIDER NOTES
Notified by radiology that pt found to have pulmonary nodule on left side.  I attempted to call patient on both numbers listed both of which have been disconnected unfortunately.      Santana Saxena MD  07/02/24 0946

## 2025-03-28 ENCOUNTER — APPOINTMENT (OUTPATIENT)
Dept: GENERAL RADIOLOGY | Age: 45
End: 2025-03-28
Payer: MEDICAID

## 2025-03-28 ENCOUNTER — HOSPITAL ENCOUNTER (EMERGENCY)
Age: 45
Discharge: HOME | End: 2025-03-28
Attending: STUDENT IN AN ORGANIZED HEALTH CARE EDUCATION/TRAINING PROGRAM
Payer: MEDICAID

## 2025-03-28 VITALS
RESPIRATION RATE: 21 BRPM | DIASTOLIC BLOOD PRESSURE: 98 MMHG | HEART RATE: 77 BPM | WEIGHT: 170 LBS | TEMPERATURE: 98 F | BODY MASS INDEX: 24.39 KG/M2 | SYSTOLIC BLOOD PRESSURE: 141 MMHG | OXYGEN SATURATION: 97 %

## 2025-03-28 DIAGNOSIS — I10 HYPERTENSION, UNSPECIFIED TYPE: Primary | ICD-10-CM

## 2025-03-28 LAB
ALBUMIN SERPL-MCNC: 4.2 G/DL (ref 3.5–5.2)
ALP SERPL-CCNC: 61 U/L (ref 35–104)
ALT SERPL-CCNC: <5 U/L (ref 10–35)
ANION GAP SERPL CALCULATED.3IONS-SCNC: 11 MMOL/L (ref 8–16)
AST SERPL-CCNC: 22 U/L (ref 10–35)
BASOPHILS # BLD: 0.1 K/UL (ref 0–0.2)
BASOPHILS NFR BLD: 1.3 % (ref 0–1)
BILIRUB SERPL-MCNC: 0.7 MG/DL (ref 0.2–1.2)
BUN SERPL-MCNC: 11 MG/DL (ref 6–20)
CALCIUM SERPL-MCNC: 9 MG/DL (ref 8.6–10)
CHLORIDE SERPL-SCNC: 105 MMOL/L (ref 98–107)
CO2 SERPL-SCNC: 24 MMOL/L (ref 22–29)
CREAT SERPL-MCNC: 1 MG/DL (ref 0.5–0.9)
EOSINOPHIL # BLD: 0.3 K/UL (ref 0–0.6)
EOSINOPHIL NFR BLD: 3 % (ref 0–5)
ERYTHROCYTE [DISTWIDTH] IN BLOOD BY AUTOMATED COUNT: 13.3 % (ref 11.5–14.5)
GLUCOSE SERPL-MCNC: 97 MG/DL (ref 70–99)
HCT VFR BLD AUTO: 46.2 % (ref 37–47)
HGB BLD-MCNC: 14.9 G/DL (ref 12–16)
IMM GRANULOCYTES # BLD: 0 K/UL
LIPASE SERPL-CCNC: 28 U/L (ref 13–60)
LYMPHOCYTES # BLD: 2.3 K/UL (ref 1.1–4.5)
LYMPHOCYTES NFR BLD: 27.8 % (ref 20–40)
MCH RBC QN AUTO: 31.2 PG (ref 27–31)
MCHC RBC AUTO-ENTMCNC: 32.3 G/DL (ref 33–37)
MCV RBC AUTO: 96.7 FL (ref 81–99)
MONOCYTES # BLD: 0.6 K/UL (ref 0–0.9)
MONOCYTES NFR BLD: 6.7 % (ref 0–10)
NEUTROPHILS # BLD: 5.1 K/UL (ref 1.5–7.5)
NEUTS SEG NFR BLD: 61 % (ref 50–65)
PLATELET # BLD AUTO: 258 K/UL (ref 130–400)
PMV BLD AUTO: 10.5 FL (ref 9.4–12.3)
POTASSIUM SERPL-SCNC: 4.3 MMOL/L (ref 3.5–5.1)
PROT SERPL-MCNC: 6.7 G/DL (ref 6.4–8.3)
RBC # BLD AUTO: 4.78 M/UL (ref 4.2–5.4)
SODIUM SERPL-SCNC: 140 MMOL/L (ref 136–145)
TROPONIN, HIGH SENSITIVITY: <6 NG/L (ref 0–14)
WBC # BLD AUTO: 8.4 K/UL (ref 4.8–10.8)

## 2025-03-28 PROCEDURE — 84484 ASSAY OF TROPONIN QUANT: CPT

## 2025-03-28 PROCEDURE — 80053 COMPREHEN METABOLIC PANEL: CPT

## 2025-03-28 PROCEDURE — 36415 COLL VENOUS BLD VENIPUNCTURE: CPT

## 2025-03-28 PROCEDURE — 99285 EMERGENCY DEPT VISIT HI MDM: CPT

## 2025-03-28 PROCEDURE — 83690 ASSAY OF LIPASE: CPT

## 2025-03-28 PROCEDURE — 85025 COMPLETE CBC W/AUTO DIFF WBC: CPT

## 2025-03-28 PROCEDURE — 71045 X-RAY EXAM CHEST 1 VIEW: CPT

## 2025-03-28 RX ORDER — LISINOPRIL 5 MG/1
5 TABLET ORAL DAILY
Qty: 30 TABLET | Refills: 0 | Status: SHIPPED | OUTPATIENT
Start: 2025-03-28 | End: 2025-04-27

## 2025-03-28 RX ORDER — ASPIRIN 81 MG/1
81 TABLET ORAL DAILY
Qty: 30 TABLET | Refills: 0 | Status: SHIPPED | OUTPATIENT
Start: 2025-03-28

## 2025-03-28 RX ORDER — ROSUVASTATIN CALCIUM 20 MG/1
20 TABLET, COATED ORAL DAILY
Qty: 30 TABLET | Refills: 0 | Status: SHIPPED | OUTPATIENT
Start: 2025-03-28

## 2025-03-28 RX ORDER — METOPROLOL SUCCINATE 25 MG/1
25 TABLET, EXTENDED RELEASE ORAL DAILY
Qty: 30 TABLET | Refills: 0 | Status: SHIPPED | OUTPATIENT
Start: 2025-03-28

## 2025-03-28 ASSESSMENT — ENCOUNTER SYMPTOMS
NAUSEA: 0
SHORTNESS OF BREATH: 0
EYE PAIN: 0
EYE REDNESS: 0
SORE THROAT: 0
VOMITING: 0
DIARRHEA: 0
COUGH: 0
CHEST TIGHTNESS: 0
ABDOMINAL PAIN: 0

## 2025-03-28 ASSESSMENT — HEART SCORE: ECG: NORMAL

## 2025-03-28 NOTE — DISCHARGE INSTRUCTIONS
Concerning your high blood pressure:    It is important you make an appointment to follow up with your family doctor for refills. I have provided you with 1 month of refills for your medicine for your blood pressure and cholesterol.     Return to the emergency department if your symptoms worsen or change, you develop headache, vision changes, weakness or numbness, trouble walking, chest pain, shortness of breath, or you have any concerns.    It is important that you take all your high blood pressure medications as prescribed.    If high blood pressure (hypertension) is left untreated, hypertension can cause damage to your various body systems including, but not limited to, your kidneys, heart, and brain. Some of these conditions can result in permanent disability or even death. Treatment of hypertension requires closely monitoring your blood pressure, diet and lifestyle modifications, and often medication. This is best performed by your primary care physician who can adjust a plan specific for your situation. Therefore, it is very important to find a (or follow-up with your) primary care doctor.

## 2025-03-28 NOTE — ED PROVIDER NOTES
[facility] EMERGENCY DEPARTMENT  eMERGENCY dEPARTMENT eNCOUnter      Pt Name: Richard Baker  MRN: [id number]  Birthdate 1980  Date of evaluation: 3/28/2025  Provider: Rebekah Pollard MD    CHIEF COMPLAINT       Chief Complaint   Patient presents with    Hypertension     States that her BP has been elevated, hasn't been taking her BP or brilinta in over one year         HISTORY OF PRESENT ILLNESS   (Location/Symptom, Timing/Onset,Context/Setting, Quality, Duration, Modifying Factors, Severity)  Note limiting factors.     HPI    Richard Baker is a 45 y.o. female with PMH of CAD, hypertension, hyperlipidemia, MI, depression who presents to the emergency department with CC of hypertension.  Patient states that her blood pressure has been elevated at home up to 180s to 190s systolic.  She has had intermittent chest pain for the last 2 weeks.  She denies any other symptoms.  She states she had insurance issues about a year ago and stopped going to her primary care physician or having her medications filled, including her blood pressure medication, statin, and Brilinta.  She has not take in these medications in a year.  She is mildly hypertensive here 166/100 which downtrended to 137/98 without intervention.        NursingNotes were reviewed.    REVIEW OF SYSTEMS    (2-9 systems for level 4, 10 or more for level 5)     Review of Systems   Constitutional:  Negative for chills, fatigue and fever.   HENT:  Negative for congestion and sore throat.    Eyes:  Negative for pain and redness.   Respiratory:  Negative for cough, chest tightness and shortness of breath.    Cardiovascular:  Positive for chest pain. Negative for leg swelling.   Gastrointestinal:  Negative for abdominal pain, diarrhea, nausea and vomiting.   Genitourinary:  Negative for dysuria and urgency.   Musculoskeletal:  Negative for neck pain and neck stiffness.   Skin:  Negative for rash and wound.   Neurological:  Negative for seizures, syncope and

## 2025-03-30 LAB
EKG P AXIS: 67 DEGREES
EKG P-R INTERVAL: 146 MS
EKG Q-T INTERVAL: 372 MS
EKG QRS DURATION: 78 MS
EKG QTC CALCULATION (BAZETT): 422 MS
EKG T AXIS: 48 DEGREES

## (undated) DEVICE — PK CATH CARD 30 CA/4

## (undated) DEVICE — 6F .070 XB 3.5 100CM: Brand: VISTA BRITE TIP

## (undated) DEVICE — MODEL BT2000 P/N 700287-012KIT CONTENTS: MANIFOLD WITH SALINE AND CONTRAST PORTS, SALINE TUBING WITH SPIKE AND HAND SYRINGE, TRANSDUCER: Brand: BT2000 AUTOMATED MANIFOLD KIT

## (undated) DEVICE — PINNACLE INTRODUCER SHEATH: Brand: PINNACLE

## (undated) DEVICE — DEV TORQ GW HOT/PINK

## (undated) DEVICE — 6F .070 JR 4 100CM: Brand: CORDIS

## (undated) DEVICE — COPILOT BLEEDBACK CONTROL VALVE: Brand: COPILOT

## (undated) DEVICE — INFLATION DEVICE: Brand: ENCORE™ 26

## (undated) DEVICE — SOLIDIFIER LIQUI LOC PLUS 2000CC

## (undated) DEVICE — HI-TORQUE BALANCE MIDDLEWEIGHT UNIVERSAL II GUIDE WIRE STRAIGHT TIP PAK  190 CM: Brand: HI-TORQUE BALANCE MIDDLEWEIGHT UNIVERSAL II

## (undated) DEVICE — ANGIO-SEAL VIP VASCULAR CLOSURE DEVICE: Brand: ANGIO-SEAL

## (undated) DEVICE — FR5 INFINITI MULTIPAC: Brand: INFINITI

## (undated) DEVICE — HI-TORQUE WHISPER ES GUIDE WIRE .014 STRAIGHTTIP 3.0 CM X 190 CM: Brand: HI-TORQUE WHISPER

## (undated) DEVICE — GW STARTER FXD CORE J .035 3X150CM 3MM

## (undated) DEVICE — PAD, DEFIB, ADULT, RADIOTRANS, PHYSIO: Brand: MEDLINE

## (undated) DEVICE — PTCA DILATATION CATHETER: Brand: EMERGE™

## (undated) DEVICE — TOOL INSRT GW MTL OR PLSTC

## (undated) DEVICE — SOL IRR NACL 0.9PCT BT 1000ML

## (undated) DEVICE — MODEL AT P65, P/N 701554-001KIT CONTENTS: HAND CONTROLLER, 3-WAY HIGH-PRESSURE STOPCOCK WITH ROTATING END AND PREMIUM HIGH-PRESSURE TUBING: Brand: ANGIOTOUCH® KIT

## (undated) DEVICE — CANN NASL ETCO2 LO/FLO A/

## (undated) DEVICE — 6F .070 JR 4 SH 100CM: Brand: VISTA BRITE TIP